# Patient Record
Sex: FEMALE | Race: WHITE | HISPANIC OR LATINO | Employment: FULL TIME | URBAN - METROPOLITAN AREA
[De-identification: names, ages, dates, MRNs, and addresses within clinical notes are randomized per-mention and may not be internally consistent; named-entity substitution may affect disease eponyms.]

---

## 2018-03-05 ENCOUNTER — NON-PROVIDER VISIT (OUTPATIENT)
Dept: URGENT CARE | Facility: CLINIC | Age: 38
End: 2018-03-05

## 2018-03-05 DIAGNOSIS — Z02.1 PRE-EMPLOYMENT DRUG SCREENING: ICD-10-CM

## 2018-03-05 LAB
AMP AMPHETAMINE: NORMAL
COC COCAINE: NORMAL
INT CON NEG: NORMAL
INT CON POS: NORMAL
MET METHAMPHETAMINES: NORMAL
OPI OPIATES: NORMAL
PCP PHENCYCLIDINE: NORMAL
POC DRUG COMMENT 753798-OCCUPATIONAL HEALTH: NEGATIVE
THC: NORMAL

## 2018-03-05 PROCEDURE — 80305 DRUG TEST PRSMV DIR OPT OBS: CPT | Performed by: PHYSICIAN ASSISTANT

## 2018-05-10 ENCOUNTER — APPOINTMENT (OUTPATIENT)
Dept: MEDICAL GROUP | Facility: MEDICAL CENTER | Age: 38
End: 2018-05-10
Payer: COMMERCIAL

## 2018-05-30 ENCOUNTER — OFFICE VISIT (OUTPATIENT)
Dept: URGENT CARE | Facility: PHYSICIAN GROUP | Age: 38
End: 2018-05-30
Payer: COMMERCIAL

## 2018-05-30 ENCOUNTER — HOSPITAL ENCOUNTER (OUTPATIENT)
Dept: RADIOLOGY | Facility: MEDICAL CENTER | Age: 38
End: 2018-05-30
Attending: PHYSICIAN ASSISTANT
Payer: COMMERCIAL

## 2018-05-30 VITALS
SYSTOLIC BLOOD PRESSURE: 142 MMHG | WEIGHT: 280 LBS | HEIGHT: 65 IN | BODY MASS INDEX: 46.65 KG/M2 | RESPIRATION RATE: 20 BRPM | DIASTOLIC BLOOD PRESSURE: 90 MMHG | HEART RATE: 92 BPM | OXYGEN SATURATION: 94 % | TEMPERATURE: 97.2 F

## 2018-05-30 DIAGNOSIS — M79.662 PAIN AND SWELLING OF LEFT LOWER LEG: ICD-10-CM

## 2018-05-30 DIAGNOSIS — S86.812A STRAIN OF CALF MUSCLE, LEFT, INITIAL ENCOUNTER: ICD-10-CM

## 2018-05-30 DIAGNOSIS — M79.89 PAIN AND SWELLING OF LEFT LOWER LEG: ICD-10-CM

## 2018-05-30 PROCEDURE — 99203 OFFICE O/P NEW LOW 30 MIN: CPT | Performed by: PHYSICIAN ASSISTANT

## 2018-05-30 PROCEDURE — 93971 EXTREMITY STUDY: CPT | Mod: LT

## 2018-05-30 ASSESSMENT — ENCOUNTER SYMPTOMS
FALLS: 0
GASTROINTESTINAL NEGATIVE: 1
LEG PAIN: 1
PALPITATIONS: 0
NEUROLOGICAL NEGATIVE: 1
RESPIRATORY NEGATIVE: 1
FEVER: 0
CHILLS: 0

## 2018-05-30 NOTE — PROGRESS NOTES
"Subjective:      Marisabel Simons is a 38 y.o. female who presents with Leg Pain (cramping sensation in left lower leg x3 days)            Leg Pain   This is a new problem. The current episode started in the past 7 days. The problem occurs constantly. The problem has been unchanged. Pertinent negatives include no change in bowel habit, chest pain, chills, fever, joint swelling, myalgias, numbness, urinary symptoms or weakness. The symptoms are aggravated by standing and walking. She has tried nothing for the symptoms. The treatment provided no relief.   Patient was at the gym 3 days ago and felt a pull of her left calf muscle. Since then she has had worsening left calf pain, swelling. Some warmth. Denies shortness of breath, chest pain or palpitations. Denies travel, nonsmoker. No history of clots.      PMH:  has no past medical history on file.  MEDS: No current outpatient prescriptions on file.  ALLERGIES: No Known Allergies  SURGHX: No past surgical history on file.  SOCHX:  reports that she has never smoked. She has never used smokeless tobacco.  FH: family history is not on file.      Review of Systems   Constitutional: Negative for chills and fever.   HENT: Negative.    Respiratory: Negative.    Cardiovascular: Positive for leg swelling (Left). Negative for chest pain and palpitations.   Gastrointestinal: Negative.  Negative for change in bowel habit.   Musculoskeletal: Negative for falls, joint pain, joint swelling and myalgias.        Left calf pain   Neurological: Negative.  Negative for weakness and numbness.       Medications, Allergies, and current problem list reviewed today in Epic     Objective:     /90   Pulse 92   Temp 36.2 °C (97.2 °F)   Resp 20   Ht 1.651 m (5' 5\")   Wt 81.6 kg (180 lb)   SpO2 94%   BMI 29.95 kg/m²      Physical Exam   Constitutional: She is oriented to person, place, and time. She appears well-developed and well-nourished. No distress.   HENT:   Head: " Normocephalic and atraumatic.   Eyes: Conjunctivae and EOM are normal.   Neck: Normal range of motion. Neck supple.   Cardiovascular: Normal rate, regular rhythm, normal heart sounds and intact distal pulses.    Pulmonary/Chest: Effort normal and breath sounds normal. No respiratory distress. She has no wheezes.   Musculoskeletal: Normal range of motion. She exhibits edema and tenderness.   Significant tenderness and swelling of the left lower extremity. Range of motion Strine joints within normal limits.   Neurological: She is alert and oriented to person, place, and time.   Skin: Skin is warm and dry. She is not diaphoretic.   Psychiatric: She has a normal mood and affect. Her behavior is normal. Judgment and thought content normal.   Nursing note and vitals reviewed.              Assessment/Plan:     1. Pain and swelling of left lower leg  US-EXTREMITY VENOUS UNILATERAL-LOWER LEFT   2. Strain of calf muscle, left, initial encounter       DVT ultrasound negative. Likely muscular strain.  Rice therapy  OTC meds and conservative measures as discussed  Return to clinic or go to ED if symptoms worsen or persist. Indications for ED discussed at length. Patient voices understanding. Follow-up with your primary care provider in 3-5 days. Red flags discussed. All side effects of medication discussed including allergic response, GI upset, tendon injury, etc.    Please note that this dictation was created using voice recognition software. I have made every reasonable attempt to correct obvious errors, but I expect that there are errors of grammar and possibly content that I did not discover before finalizing the note.

## 2018-06-06 ASSESSMENT — ENCOUNTER SYMPTOMS
WEAKNESS: 0
MYALGIAS: 0
CHANGE IN BOWEL HABIT: 0
NUMBNESS: 0
JOINT SWELLING: 0

## 2020-01-23 ENCOUNTER — HOSPITAL ENCOUNTER (EMERGENCY)
Facility: MEDICAL CENTER | Age: 40
End: 2020-01-23
Attending: EMERGENCY MEDICINE
Payer: COMMERCIAL

## 2020-01-23 ENCOUNTER — APPOINTMENT (OUTPATIENT)
Dept: RADIOLOGY | Facility: MEDICAL CENTER | Age: 40
End: 2020-01-23
Attending: EMERGENCY MEDICINE
Payer: COMMERCIAL

## 2020-01-23 ENCOUNTER — NON-PROVIDER VISIT (OUTPATIENT)
Dept: OCCUPATIONAL MEDICINE | Facility: CLINIC | Age: 40
End: 2020-01-23
Payer: COMMERCIAL

## 2020-01-23 VITALS
TEMPERATURE: 98 F | RESPIRATION RATE: 38 BRPM | DIASTOLIC BLOOD PRESSURE: 76 MMHG | SYSTOLIC BLOOD PRESSURE: 171 MMHG | BODY MASS INDEX: 46.69 KG/M2 | HEART RATE: 86 BPM | OXYGEN SATURATION: 99 % | WEIGHT: 280.2 LBS | HEIGHT: 65 IN

## 2020-01-23 DIAGNOSIS — Z02.83 ENCOUNTER FOR DRUG SCREENING: ICD-10-CM

## 2020-01-23 DIAGNOSIS — S80.01XA CONTUSION OF RIGHT KNEE, INITIAL ENCOUNTER: ICD-10-CM

## 2020-01-23 DIAGNOSIS — S62.101A CLOSED FRACTURE OF RIGHT WRIST, INITIAL ENCOUNTER: ICD-10-CM

## 2020-01-23 DIAGNOSIS — I10 HYPERTENSION, UNSPECIFIED TYPE: ICD-10-CM

## 2020-01-23 PROCEDURE — 99285 EMERGENCY DEPT VISIT HI MDM: CPT

## 2020-01-23 PROCEDURE — 82075 ASSAY OF BREATH ETHANOL: CPT | Performed by: PREVENTIVE MEDICINE

## 2020-01-23 PROCEDURE — 700111 HCHG RX REV CODE 636 W/ 250 OVERRIDE (IP): Performed by: EMERGENCY MEDICINE

## 2020-01-23 PROCEDURE — 73110 X-RAY EXAM OF WRIST: CPT | Mod: LT

## 2020-01-23 PROCEDURE — 25605 CLTX DST RDL FX/EPHYS SEP W/: CPT

## 2020-01-23 PROCEDURE — 29125 APPL SHORT ARM SPLINT STATIC: CPT

## 2020-01-23 PROCEDURE — 99026 IN-HOSPITAL ON CALL SERVICE: CPT | Performed by: PREVENTIVE MEDICINE

## 2020-01-23 PROCEDURE — 25565 CLTX RDL&ULN SHFT FX W/MNPJ: CPT

## 2020-01-23 PROCEDURE — 73100 X-RAY EXAM OF WRIST: CPT | Mod: LT

## 2020-01-23 PROCEDURE — 96375 TX/PRO/DX INJ NEW DRUG ADDON: CPT

## 2020-01-23 PROCEDURE — 96376 TX/PRO/DX INJ SAME DRUG ADON: CPT

## 2020-01-23 PROCEDURE — 302874 HCHG BANDAGE ACE 2 OR 3""

## 2020-01-23 PROCEDURE — 96365 THER/PROPH/DIAG IV INF INIT: CPT

## 2020-01-23 RX ORDER — OXYCODONE HYDROCHLORIDE AND ACETAMINOPHEN 5; 325 MG/1; MG/1
1 TABLET ORAL EVERY 4 HOURS PRN
Qty: 20 TAB | Refills: 0 | Status: SHIPPED | OUTPATIENT
Start: 2020-01-23 | End: 2020-01-26

## 2020-01-23 RX ORDER — ONDANSETRON 2 MG/ML
4 INJECTION INTRAMUSCULAR; INTRAVENOUS ONCE
Status: COMPLETED | OUTPATIENT
Start: 2020-01-23 | End: 2020-01-23

## 2020-01-23 RX ORDER — MORPHINE SULFATE 4 MG/ML
4 INJECTION, SOLUTION INTRAMUSCULAR; INTRAVENOUS ONCE
Status: COMPLETED | OUTPATIENT
Start: 2020-01-23 | End: 2020-01-23

## 2020-01-23 RX ADMIN — MORPHINE SULFATE 4 MG: 4 INJECTION INTRAVENOUS at 22:00

## 2020-01-23 RX ADMIN — MORPHINE SULFATE 4 MG: 4 INJECTION INTRAVENOUS at 20:30

## 2020-01-23 RX ADMIN — PROPOFOL 80 MG: 10 INJECTION, EMULSION INTRAVENOUS at 19:15

## 2020-01-23 RX ADMIN — MORPHINE SULFATE 4 MG: 4 INJECTION INTRAVENOUS at 18:21

## 2020-01-23 RX ADMIN — ONDANSETRON 4 MG: 2 INJECTION INTRAMUSCULAR; INTRAVENOUS at 18:21

## 2020-01-23 NOTE — LETTER
"  FORM C-4:  EMPLOYEE’S CLAIM FOR COMPENSATION/ REPORT OF INITIAL TREATMENT  EMPLOYEE’S CLAIM - PROVIDE ALL INFORMATION REQUESTED   First Name Marisabel Last Name Ronak Birthdate 1980  Sex female Claim Number   Home Employee Address [address]                                     Zip  [address] Height  1.651 m (5' 5\") Weight  (!) 127.1 kg (280 lb 3.3 oz) [facility]     Mailing Employee Address [address]               Zip  [address] Telephone  [phone] (home)  Primary Language Spoken   Insurer  [employer]   [employer] 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 (if applicable) walking down the melo    How did this injury or occupational disease occur? Be specific and answer in detail. Use additional sheet if necessary)  Walking back from a meeting, slipped, and fell against the wall   If you believe that you have an occupational disease, when did you first have knowledge of the disability and it relationship to your employment? n/a Witnesses to the Accident  n/a   Nature of Injury or Occupational Disease  Contusion Part(s) of Body Injured or Affected  Wrist (L) and Hand (L), Knee (L), N/A    I CERTIFY THAT THE ABOVE IS TRUE AND CORRECT TO THE BEST OF MY KNOWLEDGE AND THAT I HAVE PROVIDED THIS INFORMATION IN ORDER TO OBTAIN THE BENEFITS OF [place]CC[place]  I HEREBY AUTHORIZE ANY PHYSICIAN, CHIROPRACTOR, SURGEON, PRACTITIONER, OR OTHER PERSON, ANY HOSPITAL, INCLUDING [facility] OR [facility], ANY MEDICAL SERVICE ORGANIZATION, ANY INSURANCE COMPANY, OR OTHER INSTITUTION OR ORGANIZATION TO RELEASE TO EACH OTHER, ANY MEDICAL OR OTHER INFORMATION, INCLUDING BENEFITS PAID OR PAYABLE, PERTINENT TO THIS INJURY OR DISEASE, EXCEPT INFORMATION RELATIVE TO DIAGNOSIS, TREATMENT AND/OR COUNSELING FOR AIDS, PSYCHOLOGICAL CONDITIONS, ALCOHOL OR CONTROLLED SUBSTANCES, FOR WHICH I MUST GIVE SPECIFIC AUTHORIZATION.  A PHOTOSTAT OF THIS AUTHORIZATION SHALL BE AS VALID AS THE ORIGINAL.  Date January 23,2020         [facility]       Employee’s Signature   THIS REPORT MUST BE COMPLETED AND MAILED WITHIN 3 WORKING DAYS OF TREATMENT   [facility], EMERGENCY DEPT                                                                             Name of Facility [facility]   Date  1/23/2020 Diagnosis  (S62.101A) Closed fracture of right wrist, initial encounter  (S80.01XA) Contusion of right knee, initial encounter  (I10) Hypertension, unspecified type Is there evidence the injured employee was under the influence of alcohol and/or another controlled substance at the time of accident?   Hour  9:35 PM Description of Injury or Disease  Closed fracture of right wrist, initial encounter  Contusion of right knee, initial encounter  Hypertension, unspecified type No   Treatment  Patient was evaluated noted to have a comminuted distal radius and ulnar fracture.  The patient was sedated and reduced in the emergency department.  The patient was placed into a splint and given pain medications.  Have you advised the patient to remain off work five days or more?         Yes   X-Ray Findings  Positive If Yes   From Date    To Date      From information  "given by the employee, together with medical evidence, can you directly connect this injury or occupational disease as job incurred? Yes If No, is employee capable of: Full Duty  No Modified Duty  Yes   Is additional medical care by a physician indicated? Yes If Modified Duty, Specify any Limitations / Restrictions   No use of the right arm until cleared by orthopedic surgery   Do you know of any previous injury or disease contributing to this condition or occupational disease? No    Date 1/23/2020 Print Doctor’s Name César Hicks certify the employer’s copy of this form was mailed on:   Address [address] [place] [id number] [phone] INSURER’S USE ONLY   Provider’s Tax ID Number [id number] Telephone Dept: [phone]    Doctor’s Signature e-CÉSAR Ding M.D. Degree  MD      Form C-4 (rev.10/07)                                                                         BRIEF DESCRIPTION OF RIGHTS AND BENEFITS  ([place]ursuant to [place] 616[place])    Notice of Injury or Occupational Disease (Incident Report Form C-1): If an injury or occupational disease (OD) arises out of and in the course of employment, you must provide written notice to your employer as soon as practicable, but no later than 7 days after the accident or OD. Your employer shall maintain a sufficient supply of the required forms.    Claim for Compensation (Form C-4): If medical treatment is sought, the form C-4 is available at the place of initial treatment. A completed \"Claim for Compensation\" (Form C-4) must be filed within 90 days after an accident or OD. The treating physician or chiropractor must, within 3 working days after treatment, complete and mail to the employer, the employer's insurer and third-party , the Claim for Compensation.    Medical Treatment: If you require medical treatment for your on-the-job injury or OD, you may be required to select a physician or chiropractor from a list provided by your " workers’ compensation insurer, if it has contracted with an Organization for Managed Care (MCO) or Preferred Provider Organization (PPO) or providers of health care. If your employer has not entered into a contract with an MCO or PPO, you may select a physician or chiropractor from the Panel of Physicians and Chiropractors. Any medical costs related to your industrial injury or OD will be paid by your insurer.    Temporary Total Disability (TTD): If your doctor has certified that you are unable to work for a period of at least 5 consecutive days, or 5 cumulative days in a 20-day period, or places restrictions on you that your employer does not accommodate, you may be entitled to TTD compensation.    Temporary Partial Disability (TPD): If the wage you receive upon reemployment is less than the compensation for TTD to which you are entitled, the insurer may be required to pay you TPD compensation to make up the difference. TPD can only be paid for a maximum of 24 months.    Permanent Partial Disability (PPD): When your medical condition is stable and there is an indication of a PPD as a result of your injury or OD, within 30 days, your insurer must arrange for an evaluation by a rating physician or chiropractor to determine the degree of your PPD. The amount of your PPD award depends on the date of injury, the results of the PPD evaluation and your age and wage.    Permanent Total Disability (PTD): If you are medically certified by a treating physician or chiropractor as permanently and totally disabled and have been granted a PTD status by your insurer, you are entitled to receive monthly benefits not to exceed 66 2/3% of your average monthly wage. The amount of your PTD payments is subject to reduction if you previously received a PPD award.    Vocational Rehabilitation Services: You may be eligible for vocational rehabilitation services if you are unable to return to the job due to a permanent physical impairment  or permanent restrictions as a result of your injury or occupational disease.    Transportation and Per Candice Reimbursement: You may be eligible for travel expenses and per candice associated with medical treatment.    Reopening: You may be able to reopen your claim if your condition worsens after claim closure.     Appeal Process: If you disagree with a written determination issued by the insurer or the insurer does not respond to your request, you may appeal to the Department of Administration, , by following the instructions contained in your determination letter. You must appeal the determination within 70 days from the date of the determination letter at [address], [address], [place], [place] [address], or [address], [address], [place], [place] [address]. If you disagree with the  decision, you may appeal to the Department of Administration, . You must file your appeal within 30 days from the date of the  decision letter at [address], [address], [place], [place] [address], or [address], [address], [place], [place] [address]. If you disagree with a decision of an , you may file a petition for judicial review with the District Court. You must do so within 30 days of the Appeal Officer’s decision. You may be represented by an  at your own expense or you may contact the [facility] for possible representation.    [place]  for Injured Workers (NAIW): If you disagree with a  decision, you may request that NAIW represent you without charge at an  Hearing. For information regarding denial of benefits, you may contact the [facility] at: [address], [address], [place] [address], [phone], or [address], [address], [place] [address], [phone]    To File a Complaint with the Division: If you wish to file a complaint with the  of  the Division of Industrial Relations (DIR),  please contact the Workers’ Compensation Section, [address], [address], [place], [place] [address], telephone ([phone]) [phone], or [address], [address], [place], [place] [address], telephone [phone].    For assistance with Workers’ Compensation Issues: You may contact the Office of the Governor Consumer Health Assistance, [address] [place] [address], [address], [place], [place] [address], Toll Free 1-488.139.5664, Web site: http://[employer].[facility].nv.us, E-mail [email or web address].nv.  D-2 (rev. 06/18)              __________________________________________________________________                                    January 23, 2020            Employee Name / Signature                                                                                                                            Date

## 2020-01-23 NOTE — LETTER
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 (if applicable) walking down the melo    How did this injury or occupational disease occur? Be specific and answer in detail. Use additional sheet if necessary)  Walking back from a meeting, slipped, and fell against the wall   If you believe that you have an occupational disease, when did you first have knowledge of the disability and it relationship to your employment? n/a Witnesses to the Accident  n/a   Nature of Injury or Occupational Disease  Contusion Part(s) of Body Injured or Affected  Wrist (L) and Hand (L), Knee (L), N/A    I CERTIFY THAT THE ABOVE IS TRUE AND CORRECT TO THE BEST OF MY KNOWLEDGE AND THAT I HAVE PROVIDED THIS INFORMATION IN ORDER TO OBTAIN THE BENEFITS OF [place] " IN[place]IVE OR [place]).  I HEREBY AUTHORIZE ANY PHYSICIAN, CHIROPRACTOR, SURGEON, PRACTITIONER, OR OTHER PERSON, ANY HOSPITAL, INCLUDING [facility] OR [facility], ANY MEDICAL SERVICE ORGANIZATION, ANY INSURANCE COMPANY, OR OTHER INSTITUTION OR ORGANIZATION TO RELEASE TO EACH OTHER, ANY MEDICAL OR OTHER INFORMATION, INCLUDING BENEFITS PAID OR PAYABLE, PERTINENT TO THIS INJURY OR DISEASE, EXCEPT INFORMATION RELATIVE TO DIAGNOSIS, TREATMENT AND/OR COUNSELING FOR AIDS, PSYCHOLOGICAL CONDITIONS, ALCOHOL OR CONTROLLED SUBSTANCES, FOR WHICH I MUST GIVE SPECIFIC AUTHORIZATION.  A PHOTOSTAT OF THIS AUTHORIZATION SHALL BE AS VALID AS THE ORIGINAL.  Date                                      Place                                                                             Employee’s Signature   THIS REPORT MUST BE COMPLETED AND MAILED WITHIN 3 WORKING DAYS OF TREATMENT   Place [facility], EMERGENCY DEPT                                                                             Name of Facility [facility]   Date  1/23/2020 Diagnosis  No diagnosis found. Is there evidence the injured employee was under the influence of alcohol and/or another controlled substance at the time of accident?   Hour  8:42 PM Description of Injury or Disease       Treatment     Have you advised the patient to remain off work five days or more?             X-Ray Findings    If Yes   From Date    To Date      From information given by the employee, together with medical evidence, can you directly connect this injury or occupational disease as job incurred?   If No, is employee capable of: Full Duty    Modified Duty      Is additional medical care by a physician indicated?   If Modified Duty, Specify any Limitations / Restrictions       Do you know of any previous injury or disease contributing to this condition or occupational disease?      Date 1/23/2020 Print Doctor’s  "Name HicksCésar ayoub EDGARDO certify the employer’s copy of this form was mailed on:   Address [address] [address] [phone] INSURER’S USE ONLY   Provider’s Tax ID Number   Telephone Dept: [phone]    Doctor’s Signature   Degree        Form C-4 (rev.10/07)                                                                         BRIEF DESCRIPTION OF RIGHTS AND BENEFITS  ([place]ursuant to [place] 616[place])    Notice of Injury or Occupational Disease (Incident Report Form C-1): If an injury or occupational disease (OD) arises out of and in the course of employment, you must provide written notice to your employer as soon as practicable, but no later than 7 days after the accident or OD. Your employer shall maintain a sufficient supply of the required forms.    Claim for Compensation (Form C-4): If medical treatment is sought, the form C-4 is available at the place of initial treatment. A completed \"Claim for Compensation\" (Form C-4) must be filed within 90 days after an accident or OD. The treating physician or chiropractor must, within 3 working days after treatment, complete and mail to the employer, the employer's insurer and third-party , the Claim for Compensation.    Medical Treatment: If you require medical treatment for your on-the-job injury or OD, you may be required to select a physician or chiropractor from a list provided by your workers’ compensation insurer, if it has contracted with an Organization for Managed Care (MCO) or Preferred Provider Organization (PPO) or providers of health care. If your employer has not entered into a contract with an MCO or PPO, you may select a physician or chiropractor from the Panel of Physicians and Chiropractors. Any medical costs related to your industrial injury or OD will be paid by your insurer.    Temporary Total Disability (TTD): If your doctor has certified that you are unable to work for a period of at least 5 consecutive days, or 5 " cumulative days in a 20-day period, or places restrictions on you that your employer does not accommodate, you may be entitled to TTD compensation.    Temporary Partial Disability (TPD): If the wage you receive upon reemployment is less than the compensation for TTD to which you are entitled, the insurer may be required to pay you TPD compensation to make up the difference. TPD can only be paid for a maximum of 24 months.    Permanent Partial Disability (PPD): When your medical condition is stable and there is an indication of a PPD as a result of your injury or OD, within 30 days, your insurer must arrange for an evaluation by a rating physician or chiropractor to determine the degree of your PPD. The amount of your PPD award depends on the date of injury, the results of the PPD evaluation and your age and wage.    Permanent Total Disability (PTD): If you are medically certified by a treating physician or chiropractor as permanently and totally disabled and have been granted a PTD status by your insurer, you are entitled to receive monthly benefits not to exceed 66 2/3% of your average monthly wage. The amount of your PTD payments is subject to reduction if you previously received a PPD award.    Vocational Rehabilitation Services: You may be eligible for vocational rehabilitation services if you are unable to return to the job due to a permanent physical impairment or permanent restrictions as a result of your injury or occupational disease.    Transportation and Per Candice Reimbursement: You may be eligible for travel expenses and per candice associated with medical treatment.    Reopening: You may be able to reopen your claim if your condition worsens after claim closure.     Appeal Process: If you disagree with a written determination issued by the insurer or the insurer does not respond to your request, you may appeal to the Department of Administration, , by following the instructions contained in  your determination letter. You must appeal the determination within 70 days from the date of the determination letter at [address], [address], [place], [place] [address], or [address], [address], [place], [place] [address]. If you disagree with the  decision, you may appeal to the Department of Administration, . You must file your appeal within 30 days from the date of the  decision letter at [address], [address], [place], [place] [address], or [address], [address], [place], [place] [address]. If you disagree with a decision of an , you may file a petition for judicial review with the District Court. You must do so within 30 days of the Appeal Officer’s decision. You may be represented by an  at your own expense or you may contact the [facility] for possible representation.    [place]  for Injured Workers (NAIW): If you disagree with a  decision, you may request that NAIW represent you without charge at an  Hearing. For information regarding denial of benefits, you may contact the [facility] at: [address], [address], [place] [address], [phone], or [address], [place] [address], [phone]    To File a Complaint with the Division: If you wish to file a complaint with the  of the Division of Industrial Relations (DIR),  please contact the Workers’ Compensation Section, [address], [address], [place], [place] [address], telephone [phone], or [address], [address], [place], [place] [address], telephone [phone].    For assistance with Workers’ Compensation Issues: You may contact the Office of the Governor Consumer Health Assistance, [address], [address], [place], [place] [address], Toll Free 1-193.676.1269, Web site: http://govLiveLoop.[facility].nv., E-mail quan@govcha.[facility].nv.  D-2  (rev. 06/18)              __________________________________________________________________                                    _________________            Employee Name / Signature                                                                                                                            Date

## 2020-01-24 NOTE — ED PROVIDER NOTES
ED Provider Note    CHIEF COMPLAINT  Chief Complaint   Patient presents with   • T-5000 GLF     slipped and fell, left wrist deformity, R knee pain        HPI  Marisabel Simons is a 39 y.o. female who presents to the ED with complaints of left wrist pain and right knee pain.  The patient slipped at work sudden landing on her wrist and her right knee.  The patient was able to ambulate afterwards but does have a deformity to her left wrist.  Patient denies any other injuries presents for evaluation.    REVIEW OF SYSTEMS  See HPI for further details. All other systems are negative.     PAST MEDICAL HISTORY  Past Medical History:   Diagnosis Date   • DM (diabetes mellitus) ([facility])        FAMILY HISTORY  History reviewed. No pertinent family history.  Patient's family history has been discussed and is been found to be noncontributory to his present illness  SOCIAL HISTORY  Social History     Socioeconomic History   • Marital status:      Spouse name: Not on file   • Number of children: Not on file   • Years of education: Not on file   • Highest education level: Not on file   Occupational History   • Not on file   Social Needs   • Financial resource strain: Not on file   • Food insecurity:     Worry: Not on file     Inability: Not on file   • Transportation needs:     Medical: Not on file     Non-medical: Not on file   Tobacco Use   • Smoking status: Never Smoker   • Smokeless tobacco: Never Used   Substance and Sexual Activity   • Alcohol use: Not on file   • Drug use: Not on file   • Sexual activity: Not on file   Lifestyle   • Physical activity:     Days per week: Not on file     Minutes per session: Not on file   • Stress: Not on file   Relationships   • Social connections:     Talks on phone: Not on file     Gets together: Not on file     Attends [employer] service: Not on file     Active member of club or organization: Not on file     Attends meetings of clubs or organizations: Not on file     Relationship  "status: Not on file   • Intimate partner violence:     Fear of current or ex partner: Not on file     Emotionally abused: Not on file     Physically abused: Not on file     Forced sexual activity: Not on file   Other Topics Concern   • Not on file   Social History Narrative   • Not on file      Pcp Pt States None      SURGICAL HISTORY  History reviewed. No pertinent surgical history.    CURRENT MEDICATIONS  Home Medications    **Home medications have not yet been reviewed for this encounter**       No current facility-administered medications on file prior to encounter.      No current outpatient medications on file prior to encounter.         ALLERGIES  No Known Allergies    PHYSICAL EXAM  VITAL SIGNS: BP (!) 171/76   Pulse 86   Temp 36.7 °C (98 °F) (Temporal)   Resp (!) 38   Ht 1.651 m (5' 5\")   Wt (!) 127.1 kg (280 lb 3.3 oz)   LMP 12/26/2019 (Approximate)   SpO2 99%   BMI 46.63 kg/m²    Pulse Oximetry was obtained. It showed a reading of Pulse Oximetry: 98 %.  I interpreted this as non-hypoxic.     Constitutional: Well developed, Well nourished, No acute distress, Non-toxic appearance.   HENT: Normocephalic, Atraumatic, Bilateral external ears normal, bilateral tympanic membranes normal, Oropharynx moist mucous membranes, No oral exudates, Nose normal.   Eyes:  conjunctiva is normal, there are no signs of exudate.   Neck: Supple, no cervical lymphadenopathy, no meningeal signs..   Lymphatic: No lymphadenopathy noted.   Cardiovascular: Regular rate and rhythm without murmurs gallops or rubs.   Thorax & Lungs: Lungs are clear to auscultation bilaterally, there are no wheezes no rales. Chest wall is nontender.  Abdomen: Soft, nontender nondistended. Bowel sounds are present.   Skin: Warm, Dry, No erythema,   Back: No tenderness, No CVA tenderness.  Musculoskeletal: Patient has obvious deformity to the left wrist.  Distal pulses are grossly intact.  The patient is normal sensation distally.  Elbow shoulder " nontender.  Patient has an abrasion to her right knee but good range of motion no significant bony tenderness able to ambulate    Neurologic: Alert & oriented x 3, Normal motor function, Normal sensory function, No focal deficits noted.   Psychiatric: Affect normal, Judgment normal, Mood normal.         RADIOLOGY/PROCEDURES  DX-WRIST-LIMITED 2- LEFT   Final Result      Significant improvement in alignment of distal radius and ulna shaft fractures      DX-WRIST-COMPLETE 3+ LEFT   Final Result      1.  Comminuted and markedly displaced fractures of the distal radius and ulna are identified.      DX-PORTABLE FLUOROSCOPY < 1 HOUR Is the patient pregnant? Unknown    (Results Pending)       Results for orders placed or performed in visit on 03/05/18   POCT 6 Panel Urine Drug Screen   Result Value Ref Range    AMPHETAMINE      POC THC      COCAINE      OPIATES      PHENCYCLIDINE      METHAMPHETAMINES      POC Urine Drug Screen Comment NEGATIVE     Internal Control Positive Valid     Internal Control Negative Valid      Joint Reduction Procedure Note    Indication: fracture    Consent: The patient was counseled regarding the procedure, it's indications, risks, potential complications and alternatives and any questions were answered. Consent was obtained.    Procedure: The pre-reduction exam showed distal perfusion & neurologic function to be normal. The patient was placed in the supine position. Anesthesia/pain control was obtained through conscious sedation.  Then the fracture was reduced using gentle pressure and traction and under fluoroscopy was able to align it relatively well.  This was an unstable fracture would continue to come out.  I then placed a sugar tong splint in place and molded it into place so that the patient would not slip out.  Patient tolerated the procedure well.        Complications: None        Conscious Sedation Procedure Note    Indication: procedural pain management    Consent: I have discussed  with the patient and/or the patient representative the indication, alternatives, and the possible risks and/or complications of the planned procedure and the anesthesia methods. The patient and/or patient representative appear to understand and agree to proceed.    Physician Involvement: The attending physician was present and supervising this procedure.    Pre-Sedation Documentation and Exam: I have personally completed a history, physical exam & review of systems for this patient (see notes).  Vital signs have been reviewed (see flow sheet for vitals).  I have reviewed the patient's history and review of systems.  Airway Assessment: Mallampati Class III - (soft palate & base of uvula are visible)  f3  Prior History of Anesthesia Complications: none    ASA Classification: Class 1 - A normal healthy patient    Sedation/ Anesthesia Plan: intravenous sedation    Medications Used: propofol intravenously    Monitoring and Safety: The patient was placed on a cardiac monitor and vital signs, pulse oximetry and level of consciousness were continuously evaluated throughout the procedure. The patient was closely monitored until recovery from the medications was complete and the patient had returned to baseline status. Respiratory therapy was on standby at all times during the procedure.      (The following sections must be completed)  Post-Sedation Vital Signs: Vital signs were reviewed and were stable after the procedure (see flow sheet for vitals)            Intraservice Time: Greater than 10 minutes    Post-Sedation Exam: Lungs: clear to auscultation bilaterally without crackles or wheezing and Cardiovascular: regular rate and rhythm, no murmurs rubs or gallops           Complications: none    I provided both the sedation and procedure, a nurse was present at the bedside for the entire procedure.         COURSE & MEDICAL DECISION MAKING  Pertinent Labs & Imaging studies reviewed. (See chart for details)  Patient presents  emerge department for evaluation.  Clinically the patient does have a Colles' type fracture on that left wrist.  It was reduced in the above fashion.  Postreduction films are as above they are actually much improved.  I did speak with Dr. Mott recommended for the patient to follow-up with their clinic this next week for further outpatient treatment and care.  I will start the patient on Percocet for pain control recommended elevation ice.  Patient should return as needed.  Patient was consciously sedated as described above.  She is much improved post sedation examination is normal.    FINAL IMPRESSION  1. Closed fracture of right wrist, initial encounter  oxyCODONE-acetaminophen (PERCOCET) 5-325 MG Tab   2. Contusion of right knee, initial encounter     3. Hypertension, unspecified type           The patient will return for new or worsening symptoms and is stable at the time of discharge.    The patient is referred to a primary physician for blood pressure management, diabetic screening, and for all other preventative health concerns.    I reviewed prescription monitoring program for patient's narcotic use before prescribing a scheduled drug.The patient will not drink alcohol nor drive with prescribed medications      In prescribing controlled substances to this patient, I certify that I have obtained and reviewed the medical history this patient I have also made a good altaf effort to obtain applicable records from other providers who have treated the patient and records did not demonstrate any increased risk of substance abuse that would prevent me from prescribing controlled substances.     I have conducted a physical exam and documented it. I have reviewed Ms. Simons’s prescription history as maintained by the [place] Prescription Monitoring Program.     I have assessed the patient’s risk for abuse, dependency, and addiction using the validated Opioid Risk Tool available at  https://www.mdcalc.com/svhnrm-xrit-lbfj-ort-narcotic-abuse.     Given the above, I believe the benefits of controlled substance therapy outweigh the risks. The reasons for prescribing controlled substances include in my professional opinion, controlled substances are a reasonable choice for this patient. Accordingly, I have discussed the risk and benefits, treatment plan, and alternative therapies with the patient. The patient has been consented for the medication and understands the risks.        DISPOSITION:  Patient will be discharged home in stable condition.    FOLLOW UP:  Jake Mott M.D.  [address] [facility] [address]  [phone]    Schedule an appointment as soon as possible for a visit       [address] [address] [phone]          OUTPATIENT MEDICATIONS:  Discharge Medication List as of 1/23/2020 10:03 PM      START taking these medications    Details   oxyCODONE-acetaminophen (PERCOCET) 5-325 MG Tab Take 1 Tab by mouth every four hours as needed for up to 3 days., Disp-20 Tab, R-0, Print Rx Paper                 Electronically signed by: César Hicks M.D., 1/23/2020 6:04 PM

## 2020-01-24 NOTE — ED TRIAGE NOTES
"Chief Complaint   Patient presents with   • T-5000 GLF     slipped and fell, left wrist deformity, R knee pain     BP (!) 210/114   Pulse 99   Temp 36.7 °C (98 °F) (Temporal)   Resp 16   Ht 1.651 m (5' 5\")   Wt (!) 127.1 kg (280 lb 3.3 oz)   SpO2 98%   Pt informed of wait times. Educated on triage process.  Asked to return to triage RN for any new or worsening of symptoms. Thanked for patience.        "

## 2020-01-24 NOTE — RESPIRATORY CARE
Conscious Sedation Respiratory Update       O2 (LPM): 3.5 (01/23/20 1930)  O2 Daily Delivery Respiratory : Nasal Cannula (01/23/20 1930)       ETCO2 30

## 2020-01-28 LAB
BREATH ALCOHOL COMMENT: NORMAL
POC BREATHALIZER: 0 PERCENT (ref 0–0.01)

## 2020-02-03 DIAGNOSIS — Z01.812 PRE-OPERATIVE LABORATORY EXAMINATION: ICD-10-CM

## 2020-02-03 LAB
ANION GAP SERPL CALC-SCNC: 10 MMOL/L (ref 0–11.9)
BUN SERPL-MCNC: 13 MG/DL (ref 8–22)
CALCIUM SERPL-MCNC: 9.3 MG/DL (ref 8.5–10.5)
CHLORIDE SERPL-SCNC: 101 MMOL/L (ref 96–112)
CO2 SERPL-SCNC: 25 MMOL/L (ref 20–33)
CREAT SERPL-MCNC: 0.54 MG/DL (ref 0.5–1.4)
GLUCOSE SERPL-MCNC: 297 MG/DL (ref 65–99)
HCG SERPL QL: NEGATIVE
POTASSIUM SERPL-SCNC: 3.7 MMOL/L (ref 3.6–5.5)
SODIUM SERPL-SCNC: 136 MMOL/L (ref 135–145)

## 2020-02-03 PROCEDURE — 36415 COLL VENOUS BLD VENIPUNCTURE: CPT

## 2020-02-03 PROCEDURE — 80048 BASIC METABOLIC PNL TOTAL CA: CPT

## 2020-02-03 PROCEDURE — 84703 CHORIONIC GONADOTROPIN ASSAY: CPT

## 2020-02-03 RX ORDER — ALBUTEROL SULFATE 90 UG/1
2 AEROSOL, METERED RESPIRATORY (INHALATION) EVERY 6 HOURS PRN
Status: ON HOLD | COMMUNITY
End: 2020-02-04

## 2020-02-04 ENCOUNTER — HOSPITAL ENCOUNTER (OUTPATIENT)
Facility: MEDICAL CENTER | Age: 40
End: 2020-02-04
Attending: ORTHOPAEDIC SURGERY | Admitting: ORTHOPAEDIC SURGERY
Payer: COMMERCIAL

## 2020-02-04 ENCOUNTER — ANESTHESIA (OUTPATIENT)
Dept: SURGERY | Facility: MEDICAL CENTER | Age: 40
End: 2020-02-04
Payer: COMMERCIAL

## 2020-02-04 ENCOUNTER — APPOINTMENT (OUTPATIENT)
Dept: RADIOLOGY | Facility: MEDICAL CENTER | Age: 40
End: 2020-02-04
Attending: ORTHOPAEDIC SURGERY
Payer: COMMERCIAL

## 2020-02-04 ENCOUNTER — ANESTHESIA EVENT (OUTPATIENT)
Dept: SURGERY | Facility: MEDICAL CENTER | Age: 40
End: 2020-02-04
Payer: COMMERCIAL

## 2020-02-04 VITALS
HEART RATE: 74 BPM | DIASTOLIC BLOOD PRESSURE: 69 MMHG | RESPIRATION RATE: 16 BRPM | BODY MASS INDEX: 45.88 KG/M2 | TEMPERATURE: 97.9 F | OXYGEN SATURATION: 94 % | SYSTOLIC BLOOD PRESSURE: 133 MMHG | HEIGHT: 65 IN | WEIGHT: 275.35 LBS

## 2020-02-04 LAB
GLUCOSE BLD-MCNC: 282 MG/DL (ref 65–99)
GLUCOSE BLD-MCNC: 287 MG/DL (ref 65–99)

## 2020-02-04 PROCEDURE — 700111 HCHG RX REV CODE 636 W/ 250 OVERRIDE (IP)

## 2020-02-04 PROCEDURE — 64415 NJX AA&/STRD BRCH PLXS IMG: CPT | Performed by: ORTHOPAEDIC SURGERY

## 2020-02-04 PROCEDURE — 160002 HCHG RECOVERY MINUTES (STAT): Performed by: ORTHOPAEDIC SURGERY

## 2020-02-04 PROCEDURE — 700105 HCHG RX REV CODE 258: Performed by: ORTHOPAEDIC SURGERY

## 2020-02-04 PROCEDURE — 501838 HCHG SUTURE GENERAL: Performed by: ORTHOPAEDIC SURGERY

## 2020-02-04 PROCEDURE — 160025 RECOVERY II MINUTES (STATS): Performed by: ORTHOPAEDIC SURGERY

## 2020-02-04 PROCEDURE — C1713 ANCHOR/SCREW BN/BN,TIS/BN: HCPCS | Performed by: ORTHOPAEDIC SURGERY

## 2020-02-04 PROCEDURE — 160046 HCHG PACU - 1ST 60 MINS PHASE II: Performed by: ORTHOPAEDIC SURGERY

## 2020-02-04 PROCEDURE — 73100 X-RAY EXAM OF WRIST: CPT | Mod: LT

## 2020-02-04 PROCEDURE — 160039 HCHG SURGERY MINUTES - EA ADDL 1 MIN LEVEL 3: Performed by: ORTHOPAEDIC SURGERY

## 2020-02-04 PROCEDURE — 82962 GLUCOSE BLOOD TEST: CPT

## 2020-02-04 PROCEDURE — 160028 HCHG SURGERY MINUTES - 1ST 30 MINS LEVEL 3: Performed by: ORTHOPAEDIC SURGERY

## 2020-02-04 PROCEDURE — 700111 HCHG RX REV CODE 636 W/ 250 OVERRIDE (IP): Performed by: ANESTHESIOLOGY

## 2020-02-04 PROCEDURE — 160009 HCHG ANES TIME/MIN: Performed by: ORTHOPAEDIC SURGERY

## 2020-02-04 PROCEDURE — A9270 NON-COVERED ITEM OR SERVICE: HCPCS | Performed by: ANESTHESIOLOGY

## 2020-02-04 PROCEDURE — 700101 HCHG RX REV CODE 250: Performed by: ANESTHESIOLOGY

## 2020-02-04 PROCEDURE — 700102 HCHG RX REV CODE 250 W/ 637 OVERRIDE(OP): Performed by: ANESTHESIOLOGY

## 2020-02-04 PROCEDURE — 160048 HCHG OR STATISTICAL LEVEL 1-5: Performed by: ORTHOPAEDIC SURGERY

## 2020-02-04 PROCEDURE — 160035 HCHG PACU - 1ST 60 MINS PHASE I: Performed by: ORTHOPAEDIC SURGERY

## 2020-02-04 DEVICE — SCREW HEX CORTICAL 3.2 12MM (1TCONX5=5): Type: IMPLANTABLE DEVICE | Site: WRIST | Status: FUNCTIONAL

## 2020-02-04 DEVICE — IMPLANTABLE DEVICE: Type: IMPLANTABLE DEVICE | Site: WRIST | Status: FUNCTIONAL

## 2020-02-04 DEVICE — WIRE K- SMOOTH .062 - (3TX6=18): Type: IMPLANTABLE DEVICE | Site: WRIST | Status: FUNCTIONAL

## 2020-02-04 DEVICE — SCREW CORTEX SELF TAPPING T8 DRILL EVOS 2.7MM X 12MM (2TX4=8): Type: IMPLANTABLE DEVICE | Site: WRIST | Status: FUNCTIONAL

## 2020-02-04 DEVICE — PEG THREADED TRX 20MM (1TCONX5=5): Type: IMPLANTABLE DEVICE | Site: WRIST | Status: FUNCTIONAL

## 2020-02-04 DEVICE — SCREW CORTEX SELF TAPPING T8 DRIVER EVOS 2.7MM X10MM (2TX4=8): Type: IMPLANTABLE DEVICE | Site: WRIST | Status: FUNCTIONAL

## 2020-02-04 DEVICE — WIRE KIRSCHNER 1.1 100MM (1TCONX12=12): Type: IMPLANTABLE DEVICE | Site: WRIST | Status: FUNCTIONAL

## 2020-02-04 DEVICE — SCREW HEX CORTICAL 3.2 11MM (1TCONX4=4): Type: IMPLANTABLE DEVICE | Site: WRIST | Status: FUNCTIONAL

## 2020-02-04 DEVICE — SCREW CORTEX SELF TAPPING T8 DRILL EVOS 2.7MM X 10MM (2TX4=8): Type: IMPLANTABLE DEVICE | Site: WRIST | Status: FUNCTIONAL

## 2020-02-04 DEVICE — SCREW CORTEX SELF TAPPING T8 DRIVER EVOS 2.7MM X 14MM (2TX4=8): Type: IMPLANTABLE DEVICE | Site: WRIST | Status: FUNCTIONAL

## 2020-02-04 RX ORDER — MIDAZOLAM HYDROCHLORIDE 1 MG/ML
INJECTION INTRAMUSCULAR; INTRAVENOUS PRN
Status: DISCONTINUED | OUTPATIENT
Start: 2020-02-04 | End: 2020-02-04 | Stop reason: SURG

## 2020-02-04 RX ORDER — HYDROMORPHONE HYDROCHLORIDE 1 MG/ML
0.2 INJECTION, SOLUTION INTRAMUSCULAR; INTRAVENOUS; SUBCUTANEOUS
Status: DISCONTINUED | OUTPATIENT
Start: 2020-02-04 | End: 2020-02-04 | Stop reason: HOSPADM

## 2020-02-04 RX ORDER — ONDANSETRON 2 MG/ML
4 INJECTION INTRAMUSCULAR; INTRAVENOUS
Status: DISCONTINUED | OUTPATIENT
Start: 2020-02-04 | End: 2020-02-04 | Stop reason: HOSPADM

## 2020-02-04 RX ORDER — DEXAMETHASONE SODIUM PHOSPHATE 4 MG/ML
INJECTION, SOLUTION INTRA-ARTICULAR; INTRALESIONAL; INTRAMUSCULAR; INTRAVENOUS; SOFT TISSUE PRN
Status: DISCONTINUED | OUTPATIENT
Start: 2020-02-04 | End: 2020-02-04 | Stop reason: SURG

## 2020-02-04 RX ORDER — SODIUM CHLORIDE, SODIUM LACTATE, POTASSIUM CHLORIDE, CALCIUM CHLORIDE 600; 310; 30; 20 MG/100ML; MG/100ML; MG/100ML; MG/100ML
INJECTION, SOLUTION INTRAVENOUS CONTINUOUS
Status: DISCONTINUED | OUTPATIENT
Start: 2020-02-04 | End: 2020-02-04 | Stop reason: HOSPADM

## 2020-02-04 RX ORDER — ROPIVACAINE HYDROCHLORIDE 5 MG/ML
INJECTION, SOLUTION EPIDURAL; INFILTRATION; PERINEURAL
Status: COMPLETED | OUTPATIENT
Start: 2020-02-04 | End: 2020-02-04

## 2020-02-04 RX ORDER — LIDOCAINE HYDROCHLORIDE 10 MG/ML
INJECTION, SOLUTION EPIDURAL; INFILTRATION; INTRACAUDAL; PERINEURAL
Status: COMPLETED
Start: 2020-02-04 | End: 2020-02-04

## 2020-02-04 RX ORDER — CELECOXIB 200 MG/1
200 CAPSULE ORAL ONCE
Status: COMPLETED | OUTPATIENT
Start: 2020-02-04 | End: 2020-02-04

## 2020-02-04 RX ORDER — LIDOCAINE HYDROCHLORIDE 20 MG/ML
INJECTION, SOLUTION EPIDURAL; INFILTRATION; INTRACAUDAL; PERINEURAL PRN
Status: DISCONTINUED | OUTPATIENT
Start: 2020-02-04 | End: 2020-02-04 | Stop reason: SURG

## 2020-02-04 RX ORDER — HYDROMORPHONE HYDROCHLORIDE 1 MG/ML
0.4 INJECTION, SOLUTION INTRAMUSCULAR; INTRAVENOUS; SUBCUTANEOUS
Status: DISCONTINUED | OUTPATIENT
Start: 2020-02-04 | End: 2020-02-04 | Stop reason: HOSPADM

## 2020-02-04 RX ORDER — OXYCODONE HYDROCHLORIDE AND ACETAMINOPHEN 5; 325 MG/1; MG/1
1 TABLET ORAL EVERY 8 HOURS PRN
COMMUNITY
End: 2020-04-19

## 2020-02-04 RX ORDER — CEFAZOLIN SODIUM 1 G/3ML
INJECTION, POWDER, FOR SOLUTION INTRAMUSCULAR; INTRAVENOUS PRN
Status: DISCONTINUED | OUTPATIENT
Start: 2020-02-04 | End: 2020-02-04 | Stop reason: SURG

## 2020-02-04 RX ORDER — MEPERIDINE HYDROCHLORIDE 25 MG/ML
12.5 INJECTION INTRAMUSCULAR; INTRAVENOUS; SUBCUTANEOUS
Status: DISCONTINUED | OUTPATIENT
Start: 2020-02-04 | End: 2020-02-04 | Stop reason: HOSPADM

## 2020-02-04 RX ORDER — HALOPERIDOL 5 MG/ML
1 INJECTION INTRAMUSCULAR
Status: DISCONTINUED | OUTPATIENT
Start: 2020-02-04 | End: 2020-02-04 | Stop reason: HOSPADM

## 2020-02-04 RX ORDER — HYDROMORPHONE HYDROCHLORIDE 1 MG/ML
0.1 INJECTION, SOLUTION INTRAMUSCULAR; INTRAVENOUS; SUBCUTANEOUS
Status: DISCONTINUED | OUTPATIENT
Start: 2020-02-04 | End: 2020-02-04 | Stop reason: HOSPADM

## 2020-02-04 RX ORDER — OXYCODONE HCL 5 MG/5 ML
5 SOLUTION, ORAL ORAL
Status: DISCONTINUED | OUTPATIENT
Start: 2020-02-04 | End: 2020-02-04 | Stop reason: HOSPADM

## 2020-02-04 RX ORDER — ACETAMINOPHEN 500 MG
1000 TABLET ORAL ONCE
Status: COMPLETED | OUTPATIENT
Start: 2020-02-04 | End: 2020-02-04

## 2020-02-04 RX ORDER — OXYCODONE HCL 5 MG/5 ML
10 SOLUTION, ORAL ORAL
Status: DISCONTINUED | OUTPATIENT
Start: 2020-02-04 | End: 2020-02-04 | Stop reason: HOSPADM

## 2020-02-04 RX ORDER — ONDANSETRON 2 MG/ML
INJECTION INTRAMUSCULAR; INTRAVENOUS PRN
Status: DISCONTINUED | OUTPATIENT
Start: 2020-02-04 | End: 2020-02-04 | Stop reason: SURG

## 2020-02-04 RX ORDER — HYDRALAZINE HYDROCHLORIDE 20 MG/ML
5 INJECTION INTRAMUSCULAR; INTRAVENOUS
Status: DISCONTINUED | OUTPATIENT
Start: 2020-02-04 | End: 2020-02-04 | Stop reason: HOSPADM

## 2020-02-04 RX ORDER — DIPHENHYDRAMINE HYDROCHLORIDE 50 MG/ML
6.25 INJECTION INTRAMUSCULAR; INTRAVENOUS
Status: DISCONTINUED | OUTPATIENT
Start: 2020-02-04 | End: 2020-02-04 | Stop reason: HOSPADM

## 2020-02-04 RX ADMIN — CEFAZOLIN 3 G: 330 INJECTION, POWDER, FOR SOLUTION INTRAMUSCULAR; INTRAVENOUS at 12:45

## 2020-02-04 RX ADMIN — EPHEDRINE SULFATE 10 MG: 50 INJECTION, SOLUTION INTRAVENOUS at 13:27

## 2020-02-04 RX ADMIN — SODIUM CHLORIDE, POTASSIUM CHLORIDE, SODIUM LACTATE AND CALCIUM CHLORIDE: 600; 310; 30; 20 INJECTION, SOLUTION INTRAVENOUS at 09:33

## 2020-02-04 RX ADMIN — DEXAMETHASONE SODIUM PHOSPHATE 8 MG: 4 INJECTION, SOLUTION INTRA-ARTICULAR; INTRALESIONAL; INTRAMUSCULAR; INTRAVENOUS; SOFT TISSUE at 12:48

## 2020-02-04 RX ADMIN — ONDANSETRON 4 MG: 2 INJECTION INTRAMUSCULAR; INTRAVENOUS at 13:52

## 2020-02-04 RX ADMIN — SODIUM CHLORIDE, POTASSIUM CHLORIDE, SODIUM LACTATE AND CALCIUM CHLORIDE: 600; 310; 30; 20 INJECTION, SOLUTION INTRAVENOUS at 13:53

## 2020-02-04 RX ADMIN — LIDOCAINE HYDROCHLORIDE 60 MG: 20 INJECTION, SOLUTION EPIDURAL; INFILTRATION; INTRACAUDAL at 12:45

## 2020-02-04 RX ADMIN — FENTANYL CITRATE 50 MCG: 50 INJECTION, SOLUTION INTRAMUSCULAR; INTRAVENOUS at 12:45

## 2020-02-04 RX ADMIN — LIDOCAINE HYDROCHLORIDE 5 ML: 10 INJECTION, SOLUTION EPIDURAL; INFILTRATION; INTRACAUDAL at 09:33

## 2020-02-04 RX ADMIN — CELECOXIB 200 MG: 200 CAPSULE ORAL at 09:42

## 2020-02-04 RX ADMIN — ACETAMINOPHEN 1000 MG: 500 TABLET ORAL at 09:42

## 2020-02-04 RX ADMIN — MIDAZOLAM HYDROCHLORIDE 2 MG: 1 INJECTION, SOLUTION INTRAMUSCULAR; INTRAVENOUS at 12:26

## 2020-02-04 RX ADMIN — EPHEDRINE SULFATE 10 MG: 50 INJECTION, SOLUTION INTRAVENOUS at 13:51

## 2020-02-04 RX ADMIN — PROPOFOL 200 MG: 10 INJECTION, EMULSION INTRAVENOUS at 12:45

## 2020-02-04 RX ADMIN — INSULIN HUMAN 6 UNITS: 100 INJECTION, SOLUTION PARENTERAL at 09:49

## 2020-02-04 RX ADMIN — ROPIVACAINE HYDROCHLORIDE 25 ML: 5 INJECTION, SOLUTION EPIDURAL; INFILTRATION; PERINEURAL at 12:26

## 2020-02-04 NOTE — DISCHARGE INSTR - OTHER INFO
DR. LARA'S POST-OPERATIVE INSTRUCTIONS    You have just undergone a ORIF distal radius and ulna by Dr. Lara in the operating room.  It is our wish that your postoperative recovery be as quick and comfortable as possible.  Please carefully review the following items that are important in your recovery.    After any operation, a certain degree of pain is to be expected. Take Advil (ibuprofen) and Extra Strength Tylenol as first line medications for mild to moderate pain. Taking each one every 6 hours, and staggering them so that you are taking one medicine every 3 hours, is the most effective. Refer to dosing instructions on the bottle, but in general ibuprofen dose is 600-800mg and Tylenol dose is 500mg. For most small procedures, this should be enough to keep you comfortable.  You may have been given a small prescription for stronger pain medicine which will help relieve more severe pain.  Pain medicine may make you drowsy so please curtail your activities appropriately.  Do not drive while taking pain medicine.      When you go home, please keep your operated arm elevated at all times (above the level of your heart).  If you do this, your swelling will be diminished and your pain will be diminished as well. You may also place an ice pack over your dressing or splint to help with swelling and pain.    For small hand procedures such as carpal tunnel release, trigger finger release, and cyst excision, the dressing that you have on your extremity should remain on for 3-4 days. It may then be removed, you can wash the incision gently with soap and water, and keep the incision covered with a band-aid or similar clean dressing. For larger procedures or if you have a splint on, these should remain on until follow up or as specifically instructed. If you feel that your dressing is too tight during the first 3 days  after surgery, you may loosen it. It is normal to see minor staining on the hand surgery dressing after surgery. If there is significant bleeding, you are advised to call the office during regular office hours to have this checked.  Make sure that your dressing is kept dry at all times.  You can take a shower if you cover your arm with a plastic bag. If your dressing gets wet, take it off and place band-aids on the wound and wrap it with sterile dressing that you can obtain from your local drug store.    Please call our office for a follow-up appointment, [phone]. Follow up after surgery is typically 10-14 days, unless you were specifically instructed otherwise. The sutures will be removed and you may be asked to see a hand therapist to optimize your functional result. Each of the hand therapists that you will be referred to have received special training in the care of the hand and upper extremity.    If you have questions regarding your surgery postop that you feel requires attention, please call the office at [phone] during business hours, or [phone] after hours for the answering service. If you feel that you have a surgical emergency postoperatively that requires immediate attention, please call the above numbers or go to the Emergency Department and ask for the Orthopedic Surgeon on call.

## 2020-02-04 NOTE — DISCHARGE INSTRUCTIONS
ACTIVITY: Rest and take it easy for the first 24 hours.  A responsible adult is recommended to remain with you during that time.  It is normal to feel sleepy.  We encourage you to not do anything that requires balance, judgment or coordination.    MILD FLU-LIKE SYMPTOMS ARE NORMAL. YOU MAY EXPERIENCE GENERALIZED MUSCLE ACHES, THROAT IRRITATION, HEADACHE AND/OR SOME NAUSEA.    FOR 24 HOURS DO NOT:  Drive, operate machinery or run household appliances.  Drink beer or alcoholic beverages.   Make important decisions or sign legal documents.    SPECIAL INSTRUCTIONS:   See page 1 for instructions from Dr. Lara.    DIET: To avoid nausea, slowly advance diet as tolerated, avoiding spicy or greasy foods for the first day.  Add more substantial food to your diet according to your physician's instructions.  INCREASE FLUIDS AND FIBER TO AVOID CONSTIPATION.    FOLLOW-UP APPOINTMENT:  A follow-up appointment should be arranged with your doctor; call to schedule.    You should CALL YOUR PHYSICIAN if you develop:  Fever greater than 101 degrees F.  Pain not relieved by medication, or persistent nausea or vomiting.  Excessive bleeding (blood soaking through dressing) or unexpected drainage from the wound.  Extreme redness or swelling around the incision site, drainage of pus or foul smelling drainage.  Inability to urinate or empty your bladder within 8 hours.  Problems with breathing or chest pain.    You should call 911 if you develop problems with breathing or chest pain.  If you are unable to contact your doctor or surgical center, you should go to the nearest emergency room or urgent care center.  Physician's telephone #: [phone]    If any questions arise, call your doctor.  If your doctor is not available, please feel free to call the Surgical Center at [phone].  The Center is open Monday through Friday from 7AM to 7PM.  You can also call the [employer] HOTLINE open 24 hours/day, 7 days/week and speak to a nurse at  [phone], or toll free at [phone].    A registered nurse may call you a few days after your surgery to see how you are doing after your procedure.    MEDICATIONS: Resume taking daily medication.  Take prescribed pain medication with food.  If no medication is prescribed, you may take non-aspirin pain medication if needed.  PAIN MEDICATION CAN BE VERY CONSTIPATING.  Take a stool softener or laxative such as senokot, pericolace, or milk of magnesia if needed.    Prescription given to family prior to surgery.  No pain medication given.    If your physician has prescribed pain medication that includes Acetaminophen (Tylenol), do not take additional Acetaminophen (Tylenol) while taking the prescribed medication.    Depression / Suicide Risk    As you are discharged from this [facility] facility, it is important to learn how to keep safe from harming yourself.    Recognize the warning signs:  · Abrupt changes in personality, positive or negative- including increase in energy   · Giving away possessions  · Change in eating patterns- significant weight changes-  positive or negative  · Change in sleeping patterns- unable to sleep or sleeping all the time   · Unwillingness or inability to communicate  · Depression  · Unusual sadness, discouragement and loneliness  · Talk of wanting to die  · Neglect of personal appearance   · Rebelliousness- reckless behavior  · Withdrawal from people/activities they love  · Confusion- inability to concentrate     If you or a loved one observes any of these behaviors or has concerns about self-harm, here's what you can do:  · Talk about it- your feelings and reasons for harming yourself  · Remove any means that you might use to hurt yourself (examples: pills, rope, extension cords, firearm)  · Get professional help from the community (Mental Health, Substance Abuse, psychological counseling)  · Do not be alone:Call your Safe Contact- someone whom you trust who will be there  for you.  · Call your local CRISIS HOTLINE 227-2515 or 293-426-3771  · Call your local Children's Mobile Crisis Response Team [place] (391) 034-5485 or www.[employer]  · Call the toll free National Suicide Prevention Hotlines   · National Suicide Prevention Lifeline 289-476-FSBE (6668)  · National NSC Line Network 800-SUICIDE (216-4713)

## 2020-02-04 NOTE — ANESTHESIA PROCEDURE NOTES
Airway  Date/Time: 2/4/2020 12:46 PM  Performed by: Judd Michael M.D.  Authorized by: Judd Michael M.D.     Location:  OR  Urgency:  Elective  Difficult Airway: No    Indications for Airway Management:  Anesthesia  Spontaneous Ventilation: absent    Sedation Level:  Deep  Preoxygenated: Yes    Mask Difficulty Assessment:  1 - vent by mask  Final Airway Type:  Supraglottic airway  Final Supraglottic Airway:  Standard LMA  SGA Size:  4  Number of Attempts at Approach:  1

## 2020-02-04 NOTE — OR NURSING
Patient AOx4, cooperative. VSS. On room air, O2Sat 92%. Reports good pain control with received peripheral nurve block. Tolerates sips of clears, no nausea. Pt educated and encouraged to use IS while awake. Family updated.

## 2020-02-04 NOTE — ANESTHESIA POSTPROCEDURE EVALUATION
Patient: Marisabel Simons    Procedure Summary     Date:  02/04/20 Room / Location:  [facility] 12 / SURGERY [facility]    Anesthesia Start:  1243 Anesthesia Stop:  1413    Procedures:       ORIF, WRIST- DISTAL RADIUS (Left Wrist)      ORIF, FRACTURE, RADIUS AND ULNA (Wrist) Diagnosis:  (OTHER FRACTURE OF LOWER END OF LEFT ULNA, INITIAL ENCOUNTER FOR)    Surgeon:  Ambrosio Lara M.D. Responsible Provider:  Judd Michael M.D.    Anesthesia Type:  general, peripheral nerve block ASA Status:  3          Final Anesthesia Type: general, peripheral nerve block  Last vitals  BP   Blood Pressure: 135/81    Temp   36.8 °C (98.2 °F)    Pulse   Pulse: 85   Resp   14    SpO2   95 %      Anesthesia Post Evaluation    Patient location during evaluation: PACU  Patient participation: complete - patient participated  Level of consciousness: awake and alert    Airway patency: patent  Anesthetic complications: no  Cardiovascular status: hemodynamically stable  Respiratory status: acceptable  Hydration status: euvolemic    PONV: none           Nurse Pain Score: 0 (NPRS)

## 2020-02-04 NOTE — OR NURSING
"Pt arrived to floor from PACU. Pt A+Ox4, able to make needs known, PIV Patent and SL. Pain 0/10 to LUE.   CSMT's and ARAMIS's WNL, Nerve block to LUE, able to wiggle fingers, \"arm and fingers are numb\", Elevation, Ice pack applied. Family at bedside, Pt getting dressed.   1521- Dr Lara at bedside.     Pt's VSS; denies N/V; states pain is 0/10 but tolerable level. Dressing CDI to LUE, Sling in place until pt as sensation to LUE. D/c orders received. IV dc'd. Pt changed into clothing with assistance. Discharge instructions given; pt and family verbalized understanding and questions answered. Patient states ready to d/c home. Prescriptions given. Pt  Completed phase 2.  "

## 2020-02-04 NOTE — ANESTHESIA PROCEDURE NOTES
Peripheral Block  Date/Time: 2/4/2020 12:26 PM  Performed by: Judd Michael M.D.  Authorized by: Judd Michael M.D.     Patient Location:  Pre-op  Start Time:  2/4/2020 12:26 PM  End Time:  2/4/2020 12:29 PM  Reason for Block: at surgeon's request and post-op pain management    patient identified, IV checked, site marked, risks and benefits discussed, surgical consent, monitors and equipment checked, pre-op evaluation and timeout performed    Patient Position:  Supine  Prep: ChloraPrep    Monitoring:  Heart rate, continuous pulse ox and cardiac monitor  Block Region:  Upper Extremity  Upper Extremity - Block Type:  BRACHIAL PLEXUS block, axillary approach    Laterality:  Left  Procedures: ultrasound guided  Image captured, interpreted and electronically stored.  Local Infiltration:  Lidocaine  Strength:  1 %  Dose:  3 ml  Block Type:  Single-shot  Needle Localization:  Ultrasound guidance  Injection Assessment:  Negative aspiration for heme, no paresthesia on injection, incremental injection and local visualized surrounding nerve on ultrasound  Evidence of intravascular injection: No     US Guided Axillary Block   US probe placed in axilla at intersection of pec major and biceps muscles.  Axillary Artery (AA) identified with Median (superficial), Radial (deep) and Ulnar (caudad) nerves surrounding artery.  Needle inserted cephalad to probe in an in plane approach to a perivascular/perineural position.  After negative aspiration LA injected with ease and visualized surrounding the artery and nerves.  Probe moved cephalad to identify corcobrachialis muscle and Musculocutaneous Nerve (MCN) within muscle belly. Needle inserted cephalad to probe in an in plane approach to a perineural position.  After negative aspiration LA injected with ease and visualized surrounding MCN.

## 2020-02-04 NOTE — OR NURSING
Notified Dr. Michael of [facility] of 282.    Prescription given to patient's mother.

## 2020-02-04 NOTE — OR NURSING
Patient allergies and NPO status verified, home medication reconciliation completed and belongings secured. Patient verbalizes understanding of pain scale, expected course of stay and plan of care. Surgical site verified with patient. IV access established; call light within reach. No further needs at this time; hourly rounding.       Dr. Michael, anesthesiologist, notified of pt's FSBG of 287  Per Dr. Michael, patient to receive 6 units of regular human insulin, subcutaneous and re-check FSBG one hour after.

## 2020-02-04 NOTE — ANESTHESIA PREPROCEDURE EVALUATION
Past Medical History:   Diagnosis Date   • Asthma    • DM (diabetes mellitus) ([facility]) 02/03/2020    diet controlled   • Hypertension          Relevant Problems   No relevant active problems       Physical Exam    Airway   Mallampati: II  TM distance: >3 FB  Neck ROM: full       Cardiovascular - normal exam  Rhythm: regular  Rate: normal  (-) murmur     Dental - normal exam         Pulmonary - normal exam  Breath sounds clear to auscultation     Abdominal   (+) obese     Neurological - normal exam                 Anesthesia Plan    ASA 3   ASA physical status 3 criteria: diabetes - poorly controlled and morbid obesity - BMI greater than or equal to 40    Plan - general and peripheral nerve block     Peripheral nerve block will be post-op pain control  Airway plan will be LMA        Induction: intravenous    Postoperative Plan: Postoperative administration of opioids is intended.    Pertinent diagnostic labs and testing reviewed    Informed Consent:    Anesthetic plan and risks discussed with patient.

## 2020-02-04 NOTE — ANESTHESIA TIME REPORT
Anesthesia Start and Stop Event Times     Date Time Event    2/4/2020 1158 Ready for Procedure     1243 Anesthesia Start     1413 Anesthesia Stop        Responsible Staff  02/04/20    Name Role Begin End    Judd Michael M.D. Anesth 1243 1413        Preop Diagnosis (Free Text):  Pre-op Diagnosis     OTHER FRACTURE OF LOWER END OF LEFT ULNA, INITIAL ENCOUNTER FOR        Preop Diagnosis (Codes):    Post op Diagnosis  Left wrist fracture      Premium Reason  Non-Premium    Comments:

## 2020-02-04 NOTE — OP REPORT
OPERATIVE NOTE     DATE OF PROCEDURE: 2/4/2020            PRE-OP DIAGNOSIS: Comminuted and significantly displaced, intra-articular, left distal radius and ulna fractures            POST-OP DIAGNOSIS: same            PROCEDURE: Open reduction internal fixation of left intra-articular distal radius and ulna fractures            SURGEON: Ambrosio Lara M.D. - Primary            ANESTHESIA: General plus regional            ESTIMATED BLOOD LOSS: Minimal                   SPECIMENS: None            COMPLICATIONS: None            CONDITION: Stable to PACU            OPERATIVE INDICATIONS AND DESCRIPTION OF PROCEDURE: This is a pleasant 39-year-old female who presented to my office after a ground-level fall with a closed fracture of the left distal radius and ulna.  The fractures were significantly displaced, significantly comminuted, and intra-articular.  We discussed treatment options.  We discussed closed and open treatment.  We discussed open reduction internal fixation of both fractures.  We discussed risks including, but not limited to, bleeding, infection, nonunion, malunion, chronic pain, need for further surgery, hardware failure, hardware irritation, risks of anesthesia.  She elected to proceed.  I saw the patient in the preoperative holding area and marked the left wrist.  They were taken back to the operating room.  General anesthesia was induced by the anesthesia provider.  A tourniquet was placed on the arm and the upper extremity was prepped and draped in usual orthopedic fashion.  A timeout was performed.  I made a 10 cm longitudinal incision over the FCR tendon sheath.  Hemostasis was obtained with bipolar cautery.  I incised the FCR sheath and pulled the tendon in an ulnar direction.  I then incised the floor of the sheath.  I then swept the FPL muscle belly manually and visualize the pronator quadratus.  There was a large rent in the pronator quadratus at the fracture site.  I elevated the pronator  quadratus sharply off of the distal radius, taking care to stay proximal to the volar radiocarpal ligaments. I encountered the distal radius fracture.  It was extremely comminuted, and she had poor bone quality.  There was at least one intra-articular split.  I carefully released the brachioradialis from the radial styloid to remove this deforming force.  I manually reduced the fracture and used fluoroscopy to confirm the reduction.  I placed a K wire through the radial styloid and into the shaft of the radius to hold the provisional reduction. Once I was happy with the reduction, I placed a distal radius plate from the tri-med set and held it provisionally with K wires.  I checked once again the reduction as well as plate positioning.  I was happy with the plate position so I placed 3 screws in the proximal portion of the plate into the shaft of the radius.  I then began placing screws distally in the most distal row of the plate.  I checked them each under fluoroscopy to make sure that they were not penetrating into the joint and that the screw lengths were appropriate.  I checked once again the reduction and was quite happy with it.  There was good volar tilt, inclination, and radial length.  The DRUJ was well reduced.  The screw lengths were appropriate, none of them were penetrating the dorsal cortex, and a joint view confirmed that no screws were violating the articular surface.   I then turned my attention to the distal ulna.  It was better reduced now that we did fix the radius, but was still a bit displaced.  I made a 10 to 12 cm longitudinal incision over the subcutaneous border of the ulna.  I bluntly dissected through the subcutaneous tissues, making sure to protect the cutaneous branch of the ulnar nerve.  I encountered the periosteum of the ulna, and elevated minimally on both sides.  I encountered an extremely comminuted distal ulna fracture.  There was several free-floating fragments.  I did my best  to manually reduce the fracture.  I placed a K wire to provisionally hold it.  I selected a 2.7 mm compression plate and placed on the ulna.  I used fluoroscopy to confirm plate size and positioning.  I placed several screws in the proximal shaft of the ulna.  I then began placing screws distally.  I had to place several locking screws distally as the bone quality was very poor.  Overall, I was able to achieve very good reduction and good stability.  The DRUJ was well aligned.  All screws were well outside of the DRUJ.  I then thoroughly irrigated both wounds with normal saline.  I reapproximated the pronator quadratus with a 2-0 Vicryl suture.  I closed the skin with 4-0 nylon suture.  A sterile dressing and splint were applied.  The patient  awoke from anesthesia and was transferred to the PACU in stable condition.

## 2020-04-19 ENCOUNTER — HOSPITAL ENCOUNTER (OUTPATIENT)
Dept: RADIOLOGY | Facility: MEDICAL CENTER | Age: 40
End: 2020-04-19
Attending: FAMILY MEDICINE
Payer: COMMERCIAL

## 2020-04-19 ENCOUNTER — OFFICE VISIT (OUTPATIENT)
Dept: URGENT CARE | Facility: PHYSICIAN GROUP | Age: 40
End: 2020-04-19
Payer: COMMERCIAL

## 2020-04-19 VITALS
HEIGHT: 65 IN | BODY MASS INDEX: 45.82 KG/M2 | SYSTOLIC BLOOD PRESSURE: 136 MMHG | DIASTOLIC BLOOD PRESSURE: 78 MMHG | WEIGHT: 275 LBS | OXYGEN SATURATION: 95 % | HEART RATE: 106 BPM | TEMPERATURE: 99.4 F

## 2020-04-19 DIAGNOSIS — M25.531 RIGHT WRIST PAIN: ICD-10-CM

## 2020-04-19 PROCEDURE — 99214 OFFICE O/P EST MOD 30 MIN: CPT | Performed by: FAMILY MEDICINE

## 2020-04-19 PROCEDURE — 93971 EXTREMITY STUDY: CPT | Mod: RT

## 2020-04-19 PROCEDURE — 73090 X-RAY EXAM OF FOREARM: CPT | Mod: RT

## 2020-04-19 RX ORDER — TRAMADOL HYDROCHLORIDE 50 MG/1
50 TABLET ORAL EVERY 8 HOURS PRN
Qty: 20 TAB | Refills: 0 | Status: SHIPPED | OUTPATIENT
Start: 2020-04-19 | End: 2020-04-26

## 2020-04-19 RX ORDER — INDOMETHACIN 50 MG/1
50 CAPSULE ORAL 3 TIMES DAILY
Qty: 6 CAP | Refills: 0 | Status: SHIPPED | OUTPATIENT
Start: 2020-04-19 | End: 2020-04-21

## 2020-04-19 RX ORDER — CEPHALEXIN 500 MG/1
500 CAPSULE ORAL 4 TIMES DAILY
Qty: 28 CAP | Refills: 0 | Status: SHIPPED | OUTPATIENT
Start: 2020-04-19 | End: 2020-04-26

## 2020-04-19 RX ORDER — DEXAMETHASONE SODIUM PHOSPHATE 4 MG/ML
8 INJECTION, SOLUTION INTRA-ARTICULAR; INTRALESIONAL; INTRAMUSCULAR; INTRAVENOUS; SOFT TISSUE ONCE
Status: COMPLETED | OUTPATIENT
Start: 2020-04-19 | End: 2020-04-19

## 2020-04-19 RX ORDER — DEXAMETHASONE SODIUM PHOSPHATE 10 MG/ML
8 INJECTION INTRAMUSCULAR; INTRAVENOUS ONCE
Status: DISCONTINUED | OUTPATIENT
Start: 2020-04-19 | End: 2020-04-19

## 2020-04-19 RX ORDER — KETOROLAC TROMETHAMINE 30 MG/ML
30 INJECTION, SOLUTION INTRAMUSCULAR; INTRAVENOUS ONCE
Status: COMPLETED | OUTPATIENT
Start: 2020-04-19 | End: 2020-04-19

## 2020-04-19 RX ADMIN — KETOROLAC TROMETHAMINE 30 MG: 30 INJECTION, SOLUTION INTRAMUSCULAR; INTRAVENOUS at 11:37

## 2020-04-19 RX ADMIN — DEXAMETHASONE SODIUM PHOSPHATE 8 MG: 4 INJECTION, SOLUTION INTRA-ARTICULAR; INTRALESIONAL; INTRAMUSCULAR; INTRAVENOUS; SOFT TISSUE at 13:28

## 2020-04-19 ASSESSMENT — PAIN SCALES - GENERAL: PAINLEVEL: 7=MODERATE-SEVERE PAIN

## 2020-04-19 NOTE — LETTER
April 19, 2020         Patient: Marisabel Simons   YOB: 1980   Date of Visit: 4/19/2020           To Whom it May Concern:    Marisabel Simons was seen in my clinic on 4/19/2020. She may return to work in 2-3 days.    If you have any questions or concerns, please don't hesitate to call.        Sincerely,           Roberto Nye M.D.  Electronically Signed

## 2020-04-19 NOTE — PROGRESS NOTES
Subjective:      Melissa Simons is a 40 y.o. female who presents with Arm Pain (right arm pain, swelling, palm of hand painful and swollen, no known injury )      - This is a pleasant and non toxic appearing 40 y.o. female with c/o woke up w/ pain/swelling Rt forearm yesterday. Seems to localized more to wrist. No specific injury or new activity that may have triggered this. Worse moving, better rest. No NVFC            ALLERGIES:  Patient has no known allergies.     PMH:  Past Medical History:   Diagnosis Date   • Asthma    • DM (diabetes mellitus) ([facility]) 02/03/2020    diet controlled   • Hypertension         PSH:  Past Surgical History:   Procedure Laterality Date   • PB OPEN TX RADIAL & ULNAR SHAFT FX FIX RADIUS A*  2/4/2020    Procedure: ORIF, FRACTURE, RADIUS AND ULNA;  Surgeon: Ambrosio Lara M.D.;  Location: SURGERY [facility];  Service: General   • WRIST ORIF Left 2/4/2020    Procedure: ORIF, WRIST- DISTAL RADIUS;  Surgeon: Ambrosio Lara M.D.;  Location: SURGERY [facility];  Service: General   • GYN SURGERY  2007/2010    c/s x2       MEDS:    Current Outpatient Medications:   •  tramadol (ULTRAM) 50 MG Tab, Take 1 Tab by mouth every 8 hours as needed for up to 7 days., Disp: 20 Tab, Rfl: 0  •  indomethacin (INDOCIN) 50 MG Cap, Take 1 Cap by mouth 3 times a day for 2 days., Disp: 6 Cap, Rfl: 0  •  cephALEXin (KEFLEX) 500 MG Cap, Take 1 Cap by mouth 4 times a day for 7 days., Disp: 28 Cap, Rfl: 0  •  Non Formulary Request, There-Biotic 1 po daily Jaida spectrum 1 po daily Target GBX 1 po daily, Disp: , Rfl:     Current Facility-Administered Medications:   •  dexamethasone (DECADRON) injection (check route below) 8 mg, 8 mg, Oral, Once, Roberto Nye M.D.    ** I have documented what I find to be significant in regards to past medical, social, family and surgical history  in my HPI or under PMH/PSH/FH review section, otherwise it is contributory **           HPI    Review of Systems  "  Musculoskeletal: Positive for joint pain.   All other systems reviewed and are negative.         Objective:     /78   Pulse (!) 106   Temp 37.4 °C (99.4 °F) (Oral)   Ht 1.651 m (5' 5\")   Wt 124.7 kg (275 lb)   SpO2 95%   BMI 45.76 kg/m²      Physical Exam  Vitals signs and nursing note reviewed.   Constitutional:       General: She is not in acute distress.     Appearance: She is well-developed. She is not diaphoretic.   HENT:      Head: Normocephalic and atraumatic.   Eyes:      Conjunctiva/sclera: Conjunctivae normal.   Cardiovascular:      Heart sounds: Normal heart sounds. No murmur.   Pulmonary:      Effort: Pulmonary effort is normal. No respiratory distress.      Breath sounds: Normal breath sounds.   Musculoskeletal:         General: Swelling and tenderness present. No deformity or signs of injury.      Comments: Rt forearm/wrist: remarkable for mild edema of wrist w/ TTP over wrist mainly and distal forearm. No erythema/warmth or streaking. About 50% decrease ROM of wrist and fingers due to pain. NVI. Upper arm w/o edema or TTP   Skin:     General: Skin is warm and dry.   Neurological:      Mental Status: She is alert.      Motor: No abnormal muscle tone.   Psychiatric:         Judgment: Judgment normal.                 Assessment/Plan:           1. Right wrist pain  DX-FOREARM RIGHT    US-EXTREMITY VENOUS UPPER UNILAT RIGHT    ketorolac (TORADOL) injection 30 mg    tramadol (ULTRAM) 50 MG Tab    indomethacin (INDOCIN) 50 MG Cap    dexamethasone (DECADRON) injection (check route below) 8 mg    cephALEXin (KEFLEX) 500 MG Cap     * possible wrist gout. Doubt deep tissue/joint infection but will cover w/ keflex for now      - Rest/elevate  - wrist splint/sling  - 1 day recheck   - E.R. precautions discussed     Dx & d/c instructions discussed w/ patient and/or family members.     Follow up with PCP (or UC if PCP is unavailable) in 1 day to make sure improving and no further additional treatment " needed, ER if not improving or feeling/getting worse.    Any realistic and/or common medication side effects that may have been given today(i.e. Rash, GI upset/constipation, sedation, elevation of BP or blood sugars) reviewed.     Patient left in stable condition      reviewed if narcotics given

## 2020-09-01 ENCOUNTER — OFFICE VISIT (OUTPATIENT)
Dept: MEDICAL GROUP | Facility: PHYSICIAN GROUP | Age: 40
End: 2020-09-01
Payer: COMMERCIAL

## 2020-09-01 ENCOUNTER — HOSPITAL ENCOUNTER (OUTPATIENT)
Dept: LAB | Facility: MEDICAL CENTER | Age: 40
End: 2020-09-01
Attending: NURSE PRACTITIONER
Payer: COMMERCIAL

## 2020-09-01 VITALS
WEIGHT: 271 LBS | DIASTOLIC BLOOD PRESSURE: 78 MMHG | HEART RATE: 80 BPM | OXYGEN SATURATION: 96 % | RESPIRATION RATE: 16 BRPM | TEMPERATURE: 97.4 F | BODY MASS INDEX: 45.15 KG/M2 | SYSTOLIC BLOOD PRESSURE: 126 MMHG | HEIGHT: 65 IN

## 2020-09-01 DIAGNOSIS — E11.9 TYPE 2 DIABETES MELLITUS WITHOUT COMPLICATION, WITHOUT LONG-TERM CURRENT USE OF INSULIN (HCC): ICD-10-CM

## 2020-09-01 DIAGNOSIS — J45.20 MILD INTERMITTENT ASTHMA WITHOUT COMPLICATION: ICD-10-CM

## 2020-09-01 DIAGNOSIS — S62.102D CLOSED FRACTURE OF LEFT WRIST WITH ROUTINE HEALING, SUBSEQUENT ENCOUNTER: ICD-10-CM

## 2020-09-01 DIAGNOSIS — E66.01 MORBID OBESITY (HCC): ICD-10-CM

## 2020-09-01 PROBLEM — S62.102A CLOSED FRACTURE OF LEFT WRIST: Status: ACTIVE | Noted: 2020-09-01

## 2020-09-01 LAB
25(OH)D3 SERPL-MCNC: 20 NG/ML (ref 30–100)
ALBUMIN SERPL BCP-MCNC: 4 G/DL (ref 3.2–4.9)
ALBUMIN/GLOB SERPL: 1.3 G/DL
ALP SERPL-CCNC: 119 U/L (ref 30–99)
ALT SERPL-CCNC: 43 U/L (ref 2–50)
ANION GAP SERPL CALC-SCNC: 12 MMOL/L (ref 7–16)
AST SERPL-CCNC: 26 U/L (ref 12–45)
BASOPHILS # BLD AUTO: 0.6 % (ref 0–1.8)
BASOPHILS # BLD: 0.05 K/UL (ref 0–0.12)
BILIRUB SERPL-MCNC: 0.2 MG/DL (ref 0.1–1.5)
BUN SERPL-MCNC: 7 MG/DL (ref 8–22)
CALCIUM SERPL-MCNC: 8.9 MG/DL (ref 8.5–10.5)
CHLORIDE SERPL-SCNC: 101 MMOL/L (ref 96–112)
CHOLEST SERPL-MCNC: 214 MG/DL (ref 100–199)
CO2 SERPL-SCNC: 23 MMOL/L (ref 20–33)
CREAT SERPL-MCNC: 0.43 MG/DL (ref 0.5–1.4)
CREAT UR-MCNC: 117.58 MG/DL
EOSINOPHIL # BLD AUTO: 0.14 K/UL (ref 0–0.51)
EOSINOPHIL NFR BLD: 1.7 % (ref 0–6.9)
ERYTHROCYTE [DISTWIDTH] IN BLOOD BY AUTOMATED COUNT: 41.8 FL (ref 35.9–50)
FASTING STATUS PATIENT QL REPORTED: NORMAL
GLOBULIN SER CALC-MCNC: 3 G/DL (ref 1.9–3.5)
GLUCOSE SERPL-MCNC: 271 MG/DL (ref 65–99)
HCT VFR BLD AUTO: 42.6 % (ref 37–47)
HDLC SERPL-MCNC: 59 MG/DL
HGB BLD-MCNC: 13.8 G/DL (ref 12–16)
IMM GRANULOCYTES # BLD AUTO: 0.02 K/UL (ref 0–0.11)
IMM GRANULOCYTES NFR BLD AUTO: 0.2 % (ref 0–0.9)
LDLC SERPL CALC-MCNC: 130 MG/DL
LYMPHOCYTES # BLD AUTO: 1.76 K/UL (ref 1–4.8)
LYMPHOCYTES NFR BLD: 21.7 % (ref 22–41)
MCH RBC QN AUTO: 27.6 PG (ref 27–33)
MCHC RBC AUTO-ENTMCNC: 32.4 G/DL (ref 33.6–35)
MCV RBC AUTO: 85.2 FL (ref 81.4–97.8)
MICROALBUMIN UR-MCNC: 18.2 MG/DL
MICROALBUMIN/CREAT UR: 155 MG/G (ref 0–30)
MONOCYTES # BLD AUTO: 0.43 K/UL (ref 0–0.85)
MONOCYTES NFR BLD AUTO: 5.3 % (ref 0–13.4)
NEUTROPHILS # BLD AUTO: 5.71 K/UL (ref 2–7.15)
NEUTROPHILS NFR BLD: 70.5 % (ref 44–72)
NRBC # BLD AUTO: 0 K/UL
NRBC BLD-RTO: 0 /100 WBC
PLATELET # BLD AUTO: 286 K/UL (ref 164–446)
PMV BLD AUTO: 10.6 FL (ref 9–12.9)
POTASSIUM SERPL-SCNC: 4 MMOL/L (ref 3.6–5.5)
PROT SERPL-MCNC: 7 G/DL (ref 6–8.2)
RBC # BLD AUTO: 5 M/UL (ref 4.2–5.4)
SODIUM SERPL-SCNC: 136 MMOL/L (ref 135–145)
T4 FREE SERPL-MCNC: 1.2 NG/DL (ref 0.93–1.7)
TRIGL SERPL-MCNC: 126 MG/DL (ref 0–149)
TSH SERPL DL<=0.005 MIU/L-ACNC: 0.86 UIU/ML (ref 0.38–5.33)
WBC # BLD AUTO: 8.1 K/UL (ref 4.8–10.8)

## 2020-09-01 PROCEDURE — 82570 ASSAY OF URINE CREATININE: CPT

## 2020-09-01 PROCEDURE — 82043 UR ALBUMIN QUANTITATIVE: CPT

## 2020-09-01 PROCEDURE — 36415 COLL VENOUS BLD VENIPUNCTURE: CPT

## 2020-09-01 PROCEDURE — 85025 COMPLETE CBC W/AUTO DIFF WBC: CPT

## 2020-09-01 PROCEDURE — 80053 COMPREHEN METABOLIC PANEL: CPT

## 2020-09-01 PROCEDURE — 84439 ASSAY OF FREE THYROXINE: CPT

## 2020-09-01 PROCEDURE — 80061 LIPID PANEL: CPT

## 2020-09-01 PROCEDURE — 99214 OFFICE O/P EST MOD 30 MIN: CPT | Performed by: NURSE PRACTITIONER

## 2020-09-01 PROCEDURE — 83036 HEMOGLOBIN GLYCOSYLATED A1C: CPT

## 2020-09-01 PROCEDURE — 82306 VITAMIN D 25 HYDROXY: CPT

## 2020-09-01 PROCEDURE — 84443 ASSAY THYROID STIM HORMONE: CPT

## 2020-09-01 RX ORDER — METFORMIN HYDROCHLORIDE 500 MG/1
500 TABLET, EXTENDED RELEASE ORAL DAILY
Qty: 30 TAB | Refills: 1 | Status: SHIPPED | OUTPATIENT
Start: 2020-09-01 | End: 2020-09-15

## 2020-09-01 SDOH — HEALTH STABILITY: MENTAL HEALTH: HOW MANY STANDARD DRINKS CONTAINING ALCOHOL DO YOU HAVE ON A TYPICAL DAY?: 7 TO 9

## 2020-09-01 SDOH — HEALTH STABILITY: MENTAL HEALTH: HOW OFTEN DO YOU HAVE 6 OR MORE DRINKS ON ONE OCCASION?: NEVER

## 2020-09-01 SDOH — HEALTH STABILITY: MENTAL HEALTH: HOW OFTEN DO YOU HAVE A DRINK CONTAINING ALCOHOL?: 2-3 TIMES A WEEK

## 2020-09-01 ASSESSMENT — PATIENT HEALTH QUESTIONNAIRE - PHQ9: CLINICAL INTERPRETATION OF PHQ2 SCORE: 0

## 2020-09-01 NOTE — PROGRESS NOTES
Marisabel Simons is a 40 y.o. female here today to establish care and for evaluation and management of:    HPI:    Mild intermittent asthma without complication  Reports childhood asthma.  Uses albuterol rarely. No wheezing reported at this time.     Closed fracture of left wrist  This year she fell and broke wrist at work.  Had ORIF and is healing     Morbid obesity ([facility])  BMI 45.1  Not currently exercising.        Current medicines (including changes today)  Current Outpatient Medications   Medication Sig Dispense Refill   • metFORMIN ER (GLUCOPHAGE XR) 500 MG TABLET SR 24 HR Take 1 Tab by mouth every day. 30 Tab 1     No current facility-administered medications for this visit.        She  has a past medical history of Asthma and DM (diabetes mellitus) ([facility]) (2020). She also has no past medical history of Hypertension.    She  has a past surgical history that includes gyn surgery (); pr open tx radial & ulnar shaft fx fix radius a* (2020); wrist orif (Left, 2020); and tubal coagulation laparoscopic bilateral.    Social History     Tobacco Use   • Smoking status: Never Smoker   • Smokeless tobacco: Never Used   Substance Use Topics   • Alcohol use: Yes     Frequency: 2-3 times a week     Drinks per session: 7 to 9     Binge frequency: Never     Comment: 3 per week   • Drug use: Yes     Frequency: 1.0 times per week     Types: Marijuana     Comment: edible       Social History     Social History Narrative   • Not on file       Family History   Problem Relation Age of Onset   • Diabetes Mother    • Diabetes Father         renal failure   • Heart Disease Father         CHF   • Cancer Father        Family Status   Relation Name Status   • Mo  Alive   • Fa           ROS  As stated in hpi  All other systems reviewed and are negative     Objective:     /78 (BP Location: Left arm, Patient Position: Sitting)   Pulse 80   Temp 36.3 °C (97.4 °F) (Temporal)   Resp 16   Ht 1.651 m  "(5' 5\")   Wt 122.9 kg (271 lb)   SpO2 96%  Body mass index is 45.1 kg/m².  Physical Exam:    Constitutional: Alert, no distress.  Skin: Warm, dry, good turgor, no rashes in visible areas. Darkened areas with mild swelling noted bilateral lower legs  Eye: Equal, round and reactive, conjunctiva clear, lids normal.  ENMT: Lips without lesions, good dentition, oropharynx clear.  Neck: Trachea midline, no masses, no thyromegaly. No cervical or supraclavicular lymphadenopathy.  Respiratory: Unlabored respiratory effort, lungs clear to auscultation, no wheezes, no ronchi.  Cardiovascular: Normal S1, S2, no murmur, no edema.  Abdomen: Soft,morbidly obese non-tender, no masses, no hepatosplenomegaly.  Psych: Alert and oriented x3, normal affect and mood.        Assessment and Plan:   The following treatment plan was discussed    1. Type 2 diabetes mellitus without complication, without long-term current use of insulin (HCC)  New problem to me.  Chronic issue.  Untreated for several years.  Strong family history of diabetes.  Labs ordered.  Referral to dietician.  Metformin  mg daily started.  Return in 2 weeks to discuss blood glucose monitoring, kidney protection and statin.  Discussed lifestyle management including diet and exercise.  Weight loss discussed.   - CBC WITH DIFFERENTIAL; Future  - Comp Metabolic Panel; Future  - MICROALBUMIN CREAT RATIO URINE; Future  - HEMOGLOBIN A1C; Future  - Lipid Profile; Future  - TSH; Future  - FREE THYROXINE; Future  - VITAMIN D,25 HYDROXY; Future  - REFERRAL TO [facility] HEALTH IMPROVEMENT PROGRAMS (HIP) Services Requested: Registered Dietitian for Medical Nutrition Therapy; Reason for Visit: Medical Condition Requiring Nutrition Counseling    2. Closed fracture of left wrist with routine healing, subsequent encounter  New problem to me.  Healing well.  Work injury.     3. Mild intermittent asthma without complication  New problem to me.  Chronic.  Historical.  Albuterol prn, " intermittently.  No recent flare, hospitalization or need for steroids or antibiotics.     4. Morbid obesity (HCC)  New problem to me.  Chronic, ongoing. Referral to dietician,  Discussed exercise 150 min/week goal.        Records requested.  Followup: No follow-ups on file.         Educated in proper administration of medication(s) ordered today including safety, possible SE, risks, benefits, rationale and alternatives to therapy.     Please note that this dictation was created using voice recognition software. I have made every reasonable attempt to correct obvious errors, but I expect that there are errors of grammar and possibly content that I did not discover before finalizing the note.

## 2020-09-02 LAB
EST. AVERAGE GLUCOSE BLD GHB EST-MCNC: 278 MG/DL
HBA1C MFR BLD: 11.3 % (ref 0–5.6)

## 2020-09-14 ENCOUNTER — PATIENT MESSAGE (OUTPATIENT)
Dept: MEDICAL GROUP | Facility: PHYSICIAN GROUP | Age: 40
End: 2020-09-14

## 2020-09-15 ENCOUNTER — OFFICE VISIT (OUTPATIENT)
Dept: MEDICAL GROUP | Facility: PHYSICIAN GROUP | Age: 40
End: 2020-09-15
Payer: COMMERCIAL

## 2020-09-15 VITALS
WEIGHT: 271 LBS | SYSTOLIC BLOOD PRESSURE: 140 MMHG | BODY MASS INDEX: 45.15 KG/M2 | DIASTOLIC BLOOD PRESSURE: 80 MMHG | OXYGEN SATURATION: 96 % | TEMPERATURE: 97.5 F | HEIGHT: 65 IN | HEART RATE: 78 BPM | RESPIRATION RATE: 16 BRPM

## 2020-09-15 DIAGNOSIS — E78.6 ELEVATED CHOLESTEROL/HIGH DENSITY LIPOPROTEIN RATIO: ICD-10-CM

## 2020-09-15 DIAGNOSIS — E66.01 MORBID OBESITY (HCC): ICD-10-CM

## 2020-09-15 DIAGNOSIS — E11.9 TYPE 2 DIABETES MELLITUS WITHOUT COMPLICATION, WITHOUT LONG-TERM CURRENT USE OF INSULIN (HCC): ICD-10-CM

## 2020-09-15 PROCEDURE — 99214 OFFICE O/P EST MOD 30 MIN: CPT | Performed by: NURSE PRACTITIONER

## 2020-09-15 RX ORDER — METFORMIN HYDROCHLORIDE 500 MG/1
1000 TABLET, EXTENDED RELEASE ORAL DAILY
Qty: 180 TAB | Refills: 3 | Status: SHIPPED | OUTPATIENT
Start: 2020-09-15 | End: 2021-03-31 | Stop reason: SDUPTHER

## 2020-09-15 RX ORDER — ATORVASTATIN CALCIUM 20 MG/1
20 TABLET, FILM COATED ORAL DAILY
Qty: 90 TAB | Refills: 3 | Status: SHIPPED | OUTPATIENT
Start: 2020-09-15 | End: 2021-07-15 | Stop reason: SDUPTHER

## 2020-09-15 RX ORDER — ALBUTEROL SULFATE 90 UG/1
2 AEROSOL, METERED RESPIRATORY (INHALATION) EVERY 4 HOURS PRN
Qty: 1 EACH | Refills: 2 | Status: SHIPPED | OUTPATIENT
Start: 2020-09-15 | End: 2021-08-24 | Stop reason: SDUPTHER

## 2020-09-15 ASSESSMENT — FIBROSIS 4 INDEX: FIB4 SCORE: 0.58

## 2020-09-15 NOTE — NON-PROVIDER
Chief Complaint   Patient presents with   • Follow-Up   • Lab Results   • Diabetes       Subjective:   Marisabel Cummings is a 40 y.o. female here today for evaluation and management of:    Morbid obesity (HCC)  Reports walking daily around [facility]. Reduced carb and sugar intake. Awaiting to see dietician.     Type 2 diabetes mellitus without complication, without long-term current use of insulin (HCC)  Results for SRUTHI CUMMINGS (MRN [id number]) as of 9/15/2020 11:26   Ref. Range 9/1/2020 11:38   Glycohemoglobin Latest Ref Range: 0.0 - 5.6 % 11.3 (H)   Estim. Avg Glu Latest Units: mg/dL 278   Will increase metformin to 1000 mg Xr daily.  Will add trulicity.  Seeing dietician, walking daily.     Elevated cholesterol/high density lipoprotein ratio   Awaiting to see dietician, increasing exercise, decreasing carbs and sugars in diet. Reducing alcohol intake and switched from beer to no sugar seltzer beverages.   Results for SRUTHI CUMMINGS (MRN [id number]) as of 9/15/2020 13:21   Ref. Range 9/1/2020 11:38   Cholesterol,Tot Latest Ref Range: 100 - 199 mg/dL 214 (H)   Triglycerides Latest Ref Range: 0 - 149 mg/dL 126   HDL Latest Ref Range: >=40 mg/dL 59   LDL Latest Ref Range: <100 mg/dL 130 (H)       Mild intermittent asthma without complication   Reports increased needs for albuterol inhaler due to poor air quality and smoke in the air. Complains of tight chest and difficulty breathing. No wheezing. Some rhinitis and ocular pruritis.    Vitamin D deficiency  She started taking vitamin D 5,000 units daily.      Current medicines (including changes today)  Current Outpatient Medications   Medication Sig Dispense Refill   • VITAMIN D-VITAMIN K PO Take 5,000 Units by mouth.     • Probiotic Product (PROBIOTIC DAILY PO) Take  by mouth.     • Dulaglutide 0.75 MG/0.5ML Solution Pen-injector Inject 0.5 mL as instructed every 7 days. 4 Each 3   • atorvastatin (LIPITOR) 20 MG Tab Take 1 Tab by mouth every  "day. 90 Tab 3   • metFORMIN ER (GLUCOPHAGE XR) 500 MG TABLET SR 24 HR Take 2 Tabs by mouth every day. 180 Tab 3   • albuterol 108 (90 Base) MCG/ACT Aero Soln inhalation aerosol Inhale 2 Puffs by mouth every four hours as needed for Shortness of Breath. 1 Each 2     No current facility-administered medications for this visit.      She  has a past medical history of Asthma and DM (diabetes mellitus) ([facility]) (02/03/2020). She also has no past medical history of Hypertension.    ROS As stated in HPI  No chest pain, no abdominal pain. + SOB with exertion r/t poor air quality relieved with albuterol inhaler       Objective:     /80 (BP Location: Left arm, Patient Position: Sitting)   Pulse 78   Temp 36.4 °C (97.5 °F) (Temporal)   Resp 16   Ht 1.651 m (5' 5\")   Wt 122.9 kg (271 lb)   SpO2 96%  Body mass index is 45.1 kg/m².   Physical Exam:  Constitutional: Alert, no distress.  Skin: Warm, dry, good turgor,no cyanosis, no rashes in visible areas.  Eye: Equal, round and reactive, conjunctiva clear, lids normal.  Ears: No tenderness, no discharge.  External canals are without any significant edema or erythema. Gross auditory acuity is intact.  Nose: symmetrical without tenderness, no discharge.  Mouth/Throat: lips without lesion.  Oropharynx clear.   Neck: Trachea midline, no masses, no obvious thyroid enlargement.. No cervical or supraclavicular lymphadenopathy. Range of motion within normal limits.  Neuro: Cranial nerves 2-12 grossly intact.  No sensory deficit.  Respiratory: Unlabored respiratory effort, lungs clear to auscultation, no wheezes, no ronchi.  Cardiovascular: Normal S1, S2, no murmur, no edema.  Psych: Alert and oriented x3, normal affect and mood and judgement.        Assessment and Plan:   The following treatment plan was discussed    1. Elevated cholesterol/high density lipoprotein ratio  New to me, chronic. Started on atorvastatin 20mg daily. Educated on potential side effects and when to seek " emergency care. Encouraged to increase daily exercise with a goal of 150 minute/week and continue diet changes. Seeing dietician.     2. Morbid obesity (HCC)  Chronic, ongoing. Seeing dietician. Encouraged to continue diet and life style changes.    3. Type 2 diabetes mellitus without complication, without long-term current use of insulin (HCC)  Chronic, ongoing. Increased metformin XR to 500mg BID. Added trulicity weekly. Encouraged continued lifestyle and diet changes. Seeing dietician.     4. Elevated cholesterol/high density lipoprotein ratio  New to me, chronic. Start atorvastatin 20mg daily. Encouraged to continue with diet and lifestyle changes. Seeing dietician    5. Mild intermittent asthma without complication  Chronic, ongoing. Refilled albuterol inhaler. Encouraged OTC cetirizine or loratadine PRN while air quality poor. Avoid walking outside when air quality is poor.     6. Vitamin D deficiency  New to me, chronic. Taking vitamin D 5,000 units daily.     Followup: Return in about 3 months (around 12/15/2020) for Diabetes.         Educated in proper administration of medication(s) ordered today including safety, possible SE, risks, benefits, rationale and alternatives to therapy.     Please note that this dictation was created using voice recognition software. I have made every reasonable attempt to correct obvious errors, but I expect that there are errors of grammar and possibly content that I did not discover before finalizing the note.

## 2020-09-15 NOTE — ASSESSMENT & PLAN NOTE
Results for SRUTHI CUMMINGS (MRN [id number]) as of 9/15/2020 11:26   Ref. Range 9/1/2020 11:38   Glycohemoglobin Latest Ref Range: 0.0 - 5.6 % 11.3 (H)   Estim. Avg Glu Latest Units: mg/dL 278   Will increase metformin to 1000 mg Xr daily.  Will add trulicity.  Seeing dietician, walking daily.

## 2020-09-15 NOTE — PROGRESS NOTES
Chief Complaint   Patient presents with   • Follow-Up   • Lab Results   • Diabetes       Subjective:   Marisabel Cummings is a 40 y.o. female here today for evaluation and management of:    Morbid obesity (HCC)  Reports walking daily around [facility]    Type 2 diabetes mellitus without complication, without long-term current use of insulin (HCC)  Results for SRUTHI CUMMINGS (MRN [id number]) as of 9/15/2020 11:26   Ref. Range 9/1/2020 11:38   Glycohemoglobin Latest Ref Range: 0.0 - 5.6 % 11.3 (H)   Estim. Avg Glu Latest Units: mg/dL 278   Will increase metformin to 1000 mg Xr daily.  Will add trulicity.  Seeing dietician, walking daily.         Elevated cholesterol/high density lipoprotein ratio   Awaiting to see dietician, increasing exercise, decreasing carbs and sugars in diet. Reducing alcohol intake and switched from beer to no sugar seltzer beverages.   Results for SRUTHI CUMMINGS (MRN [id number]) as of 9/15/2020 13:21    Ref. Range 9/1/2020 11:38   Cholesterol,Tot Latest Ref Range: 100 - 199 mg/dL 214 (H)   Triglycerides Latest Ref Range: 0 - 149 mg/dL 126   HDL Latest Ref Range: >=40 mg/dL 59   LDL Latest Ref Range: <100 mg/dL 130 (H)     Mild intermittent asthma without complication   Reports increased needs for albuterol inhaler due to poor air quality and smoke in the air. Complains of tight chest and difficulty breathing. No wheezing. Some rhinitis and ocular pruritis.     Vitamin D deficiency   She started taking vitamin D 5,000 units daily.         Current medicines (including changes today)  Current Outpatient Medications   Medication Sig Dispense Refill   • VITAMIN D-VITAMIN K PO Take 5,000 Units by mouth.     • Probiotic Product (PROBIOTIC DAILY PO) Take  by mouth.     • Dulaglutide 0.75 MG/0.5ML Solution Pen-injector Inject 0.5 mL as instructed every 7 days. 4 Each 3   • atorvastatin (LIPITOR) 20 MG Tab Take 1 Tab by mouth every day. 90 Tab 3   • metFORMIN ER (GLUCOPHAGE XR) 500  "MG TABLET SR 24 HR Take 2 Tabs by mouth every day. 180 Tab 3   • albuterol 108 (90 Base) MCG/ACT Aero Soln inhalation aerosol Inhale 2 Puffs by mouth every four hours as needed for Shortness of Breath. 1 Each 2     No current facility-administered medications for this visit.      She  has a past medical history of Asthma and DM (diabetes mellitus) ([facility]) (02/03/2020). She also has no past medical history of Hypertension.    ROS as stated in hpi  No chest pain, no shortness of breath, no abdominal pain       Objective:     /80 (BP Location: Left arm, Patient Position: Sitting)   Pulse 78   Temp 36.4 °C (97.5 °F) (Temporal)   Resp 16   Ht 1.651 m (5' 5\")   Wt 122.9 kg (271 lb)   SpO2 96%  Body mass index is 45.1 kg/m².   Physical Exam:  Constitutional: Alert, no distress.  Skin: Warm, dry, good turgor,no cyanosis, no rashes in visible areas.  Eye: Equal, round and reactive, conjunctiva clear, lids normal.  Ears: No tenderness, no discharge.  External canals are without any significant edema or erythema.  Tympanic membranes are without any inflammation, no effusion.  Gross auditory acuity is intact.  Nose: symmetrical without tenderness, no discharge.  Mouth/Throat: lips without lesion.  Oropharynx clear.  Throat without erythema, exudates or tonsillar enlargement.  Neck: Trachea midline, no masses, no obvious thyroid enlargement.. No cervical or supraclavicular lymphadenopathy. Range of motion within normal limits.  Neuro: Cranial nerves 2-12 grossly intact.  No sensory deficit.  Respiratory: Unlabored respiratory effort, lungs clear to auscultation, no wheezes, no ronchi.  Cardiovascular: Normal S1, S2, no murmur, no edema.  Abdomen: Soft, non-tender, no masses, no guarding,  no hepatosplenomegaly.  Psych: Alert and oriented x3, normal affect and mood and judgement.        Assessment and Plan:       Assessment and Plan:   The following treatment plan was discussed   1. Elevated cholesterol/high density " lipoprotein ratio   New to me, chronic. Started on atorvastatin 20mg daily. Educated on potential side effects and when to seek emergency care. Encouraged to increase daily exercise with a goal of 150 minute/week and continue diet changes. Seeing dietician.   2. Morbid obesity (HCC)   Chronic, ongoing. Seeing dietician. Encouraged to continue diet and life style changes.   3. Type 2 diabetes mellitus without complication, without long-term current use of insulin (HCC)   Chronic, ongoing. Increased metformin XR to 500mg BID. Added trulicity weekly. Encouraged continued lifestyle and diet changes. Seeing dietician.   4. Elevated cholesterol/high density lipoprotein ratio   New to me, chronic. Start atorvastatin 20mg daily. Encouraged to continue with diet and lifestyle changes. Seeing dietician   5. Mild intermittent asthma without complication   Chronic, ongoing. Refilled albuterol inhaler. Encouraged OTC cetirizine or loratadine PRN while air quality poor. Avoid walking outside when air quality is poor.   6. Vitamin D deficiency   New to me, chronic. Taking vitamin D 5,000 units daily.         Followup: Return in about 3 months (around 12/15/2020) for Diabetes.         Educated in proper administration of medication(s) ordered today including safety, possible SE, risks, benefits, rationale and alternatives to therapy.     Please note that this dictation was created using voice recognition software. I have made every reasonable attempt to correct obvious errors, but I expect that there are errors of grammar and possibly content that I did not discover before finalizing the note.

## 2020-09-22 ENCOUNTER — NON-PROVIDER VISIT (OUTPATIENT)
Dept: HEALTH INFORMATION MANAGEMENT | Facility: MEDICAL CENTER | Age: 40
End: 2020-09-22
Payer: COMMERCIAL

## 2020-09-22 ENCOUNTER — PATIENT MESSAGE (OUTPATIENT)
Dept: MEDICAL GROUP | Facility: PHYSICIAN GROUP | Age: 40
End: 2020-09-22

## 2020-09-22 VITALS — BODY MASS INDEX: 46.7 KG/M2 | HEIGHT: 65 IN | WEIGHT: 280.3 LBS

## 2020-09-22 DIAGNOSIS — E11.9 TYPE 2 DIABETES MELLITUS WITHOUT COMPLICATION, WITHOUT LONG-TERM CURRENT USE OF INSULIN (HCC): ICD-10-CM

## 2020-09-22 PROCEDURE — 97802 MEDICAL NUTRITION INDIV IN: CPT | Performed by: DIETITIAN, REGISTERED

## 2020-09-22 ASSESSMENT — FIBROSIS 4 INDEX: FIB4 SCORE: 0.58

## 2020-09-22 NOTE — TELEPHONE ENCOUNTER
From: Marisabel Simons  To: DIPIKA Norman  Sent: 9/22/2020 11:13 AM PDT  Subject: Non-Urgent Medical Question    XIMENA I picked up the meds prescribed and started taking the Trulicity yesterday. Insurance covered most of the cost and I had a very doable co-pay. We are ok to continue with the Trulicity.    Thank you!      ----- Message -----   From:DIPIKA Norman   Sent:9/15/2020  5:13 PM PDT   To:Marisabel Simons   Subject:RE: Non-Urgent Medical Question    Melissa Pierson!  Take care  DIPIKA Norman      ----- Message -----   From:Marisabel Simons   Sent:9/15/2020 4:21 PM PDT   To:DIPIKA Norman   Subject:RE: Non-Urgent Medical Question    Thank you for the info! I have scheduled my first appointment with the Health Improvement dept. for Tue. 9/22 @3:30 pm.      ----- Message -----   From:Adria Monson Ass't   Sent:9/14/2020 4:43 PM PDT   To:Marisabel Simons   Subject:RE: Non-Urgent Medical Question    Marisabel,    It looks like the referral has been processed, please call to schedule.     [facility]  [id number] [place] [address], [address]  [place] [address]  Phone: [phone]     Thank you  Eli Nice [facility] Ass't      ----- Message -----   From:Marisabel Simons   Sent:9/14/2020 3:41 PM PDT   To:DIPIKA Norman   Subject:Non-Urgent Medical Question    Ant Siddiqi and Dr. Vieyra,    Thank you for seeing me a couple of weeks ago. During the visit Dr. Vieyra mentioned I would be refereed to the diabetic nutritionist.   I am very much looking forward to getting some support with my diet. Please let me know what I need to do go get an appointment set up with the nutritionist.    Kindest regards,

## 2020-09-22 NOTE — PROGRESS NOTES
"9/22/2020    DIPIKA Norman  40 y.o.   Time in/out: 3:33-4:36 pm     Anthropometrics/Objective  Vitals:    09/22/20 1637   Weight: (!) 127.1 kg (280 lb 4.8 oz)   Height: 1.651 m (5' 5\")       Body mass index is 46.64 kg/m².    Stated Goal Weight: 200 lb  Estimated caloric needs: 1932 kcals ([place]  Kateryna)   See comprehensive patient history form for further information     Subjective:  -Would like to improve her overall health, get her diabetes/blood sugars under better control, and lose weight  -Looking for a sustainable diet plan, has tried keto, juicing, intermittent fasting, and Atkins in the past with no long lasting success, \"falls of the wagon, and then binge eats\"   -Important for her to find an eating pattern that will also work for her family and cultural preferences, lives with 6 others (2 children of her own)   -She would like to focus on her own health first so she can set a better example for family  -Does not have control over all of the food brought into the house which is a barrier for her   -Walks occasionally, but not consistent   -Currently working from home, returning to office soon   -Not interested in counting calories   -Recently started on Trulicity and Metformin     Nutrition Diagnosis (PES Statement)  · Altered nutrition related lab values related to endocrine dysfunction as evidenced by HgbA1c of 11.3%  · Obesity related to excessive energy intake and inadequate energy energy expenditure as evidenced by BMI of 46.6    Client history:  Condition(s) associated with a diagnosis or treatment (specify) T2DM, obesity, elevated cholesterol/LDL    Biochemical data, medical test and procedures  Lab Results   Component Value Date/Time    HBA1C 11.3 (H) 09/01/2020 11:38 AM   @  Lab Results   Component Value Date/Time    POCGLUCOSE 282 (H) 02/04/2020 11:02 AM     Lab Results   Component Value Date/Time    CHOLSTRLTOT 214 (H) 09/01/2020 11:38 AM     (H) 09/01/2020 11:38 AM    HDL " 59 09/01/2020 11:38 AM    TRIGLYCERIDE 126 09/01/2020 11:38 AM   The pt does not have a glucometer and therefore is not currently checking her blood sugars. She was encouraged to request a glucometer and supplies from her PCP and begin checking.      Nutrition Intervention  Nutrition Prescription  Carb choices/grams: 30-45 per meal, 15 for snacks     Meal and Snack  Recommend a general/healthful diet, carbohydrate & calorie controlled    Comprehensive Nutrition education Instruction or training leading to in-depth nutrition related knowledge about:  Combine carb, protein and fat at each meal, Meal timing and spacing, Menu Planning, Metabolism of carb, protein, fat, Portion control, Sweets and alcohol in moderation, Heart-healthy guidelines, Label Reading.  Handouts provided on today's visit:  - Nutrition Basics  - My Plate Planner   -Carb serving List  -Food Label   -Food Journal   Monitoring & Evaluation Plan    Behavioral-Environmental:  Behavior: Start a written food journal tracking what you eat, how much you eat, and amount of carbs. Read the food label to identify amount of CHO in foods.   Exercise: Continue walking when air quality if clear     Food / Nutrient Intake:  Food intake: Follow the plate method for meal balance and portion control - fill 1/2 the plate with non-starchy vegetables, 3-4 ounces lean protein, 1-2 heart healthy fats, and <1 cup of complex carbohydrates. Choose complex carb and avoid simple sugars. Limit chocolate.   Macronutrients intake: 30-45 grams CHO per meal, 15 grams + 1 oz protein for snacks (Consistent Carb)     Physical Signs / Symptoms:  HbA1c profiles <7%  Weight loss 1-2 lbs a week to goal     Assessment Notes:  Melissa is looking for a more sustainable eating plan. I recommended a more balanced carb consistent eating pattern that is less restrictive than the fad diets she has tried previously without success. I first reviewed nutrition basics, the differences between  CHO/protein/fat and their effects on BG’s. This information was then related to the plate method to help her with meal planning and appropriate portions at meals. I showed Melissa how to use the food label to identify how much carbohydrate are in the foods she is eating and suggested she start with no more than 30-45 grams of CHO per meal and up to 15 grams of CHO at snacks, if needed. She is not interested in counting calories, but would like to start with a food journal tracking her specifically her portions and carbs to help increase her awareness about her current eating habits. I discouraged intermittent fasting as she agrees it often leads her to overeating at the next meal which is not optimal for blood glucose control or weight loss. Next visit we will discuss snacks in further detail, review her journal, review cook book recommendations, and reassess her readiness to change.     Follow up: 2 weeks

## 2020-09-23 DIAGNOSIS — E11.9 TYPE 2 DIABETES MELLITUS WITHOUT COMPLICATION, WITHOUT LONG-TERM CURRENT USE OF INSULIN (HCC): ICD-10-CM

## 2020-09-23 RX ORDER — LANCETS 30 GAUGE
EACH MISCELLANEOUS
Qty: 200 EACH | Refills: 0 | Status: SHIPPED | OUTPATIENT
Start: 2020-09-23

## 2020-09-23 NOTE — TELEPHONE ENCOUNTER
2. What supplies does the patient need? Glucometer, Lancets and Test strips    3. What brand of meter does the patient use? f Insurance Preference.    4. How many times a day is the patient testing? BID    Dx: E11.65, is not using insulin. Last A1c =   Lab Results   Component Value Date/Time    HBA1C 11.3 (H) 09/01/2020 11:38 AM   .

## 2020-10-14 ENCOUNTER — APPOINTMENT (OUTPATIENT)
Dept: HEALTH INFORMATION MANAGEMENT | Facility: MEDICAL CENTER | Age: 40
End: 2020-10-14
Payer: COMMERCIAL

## 2021-01-07 ENCOUNTER — OFFICE VISIT (OUTPATIENT)
Dept: MEDICAL GROUP | Facility: PHYSICIAN GROUP | Age: 41
End: 2021-01-07
Payer: COMMERCIAL

## 2021-01-07 VITALS
HEIGHT: 65 IN | SYSTOLIC BLOOD PRESSURE: 136 MMHG | HEART RATE: 82 BPM | TEMPERATURE: 97.2 F | BODY MASS INDEX: 46.32 KG/M2 | DIASTOLIC BLOOD PRESSURE: 84 MMHG | OXYGEN SATURATION: 95 % | RESPIRATION RATE: 18 BRPM | WEIGHT: 278 LBS

## 2021-01-07 DIAGNOSIS — J45.20 MILD INTERMITTENT ASTHMA WITHOUT COMPLICATION: ICD-10-CM

## 2021-01-07 DIAGNOSIS — E11.9 TYPE 2 DIABETES MELLITUS WITHOUT COMPLICATION, WITHOUT LONG-TERM CURRENT USE OF INSULIN (HCC): ICD-10-CM

## 2021-01-07 DIAGNOSIS — Z12.31 ENCOUNTER FOR SCREENING MAMMOGRAM FOR MALIGNANT NEOPLASM OF BREAST: ICD-10-CM

## 2021-01-07 DIAGNOSIS — E66.01 MORBID OBESITY (HCC): ICD-10-CM

## 2021-01-07 LAB
HBA1C MFR BLD: 11.1 % (ref 0–5.6)
INT CON NEG: NEGATIVE
INT CON POS: POSITIVE

## 2021-01-07 PROCEDURE — 83036 HEMOGLOBIN GLYCOSYLATED A1C: CPT | Performed by: NURSE PRACTITIONER

## 2021-01-07 PROCEDURE — 99214 OFFICE O/P EST MOD 30 MIN: CPT | Performed by: NURSE PRACTITIONER

## 2021-01-07 RX ORDER — PHENTERMINE HYDROCHLORIDE 37.5 MG/1
37.5 TABLET ORAL
Qty: 30 TAB | Refills: 0 | Status: SHIPPED | OUTPATIENT
Start: 2021-01-07 | End: 2021-02-04 | Stop reason: SDUPTHER

## 2021-01-07 ASSESSMENT — PATIENT HEALTH QUESTIONNAIRE - PHQ9: CLINICAL INTERPRETATION OF PHQ2 SCORE: 0

## 2021-01-07 ASSESSMENT — FIBROSIS 4 INDEX: FIB4 SCORE: 0.58

## 2021-01-07 NOTE — ASSESSMENT & PLAN NOTE
BMI 46.98  Would like to try some phentermine.  Working on keto, intermittent fasting and working out daily.  No issues on PDMP discussed risks and benefits.  Return in one month.

## 2021-01-07 NOTE — PROGRESS NOTES
Chief Complaint   Patient presents with   • Follow-Up   • Diabetes       Subjective:   Marisabel Simons is a 40 y.o. female here today for evaluation and management of:    Type 2 diabetes mellitus without complication, without long-term current use of insulin (HCC)  A1c 11.1 today down from 11.3.  Has started keto and intermittent fasting.  Taking metformin Xr 1000 mg daily and trulicity weekly.  Will refer to pharmacy to help consult and help with management.     Morbid obesity (HCC)  BMI 46.98  Would like to try some phentermine.  Working on keto, intermittent fasting and working out daily.  No issues on PDMP discussed risks and benefits.  Return in one month.     Mild intermittent asthma without complication  Using albuterol occasionally.            Current medicines (including changes today)  Current Outpatient Medications   Medication Sig Dispense Refill   • Dulaglutide 0.75 MG/0.5ML Solution Pen-injector Inject 0.5 mL under the skin every 7 days. 12 Each 3   • phentermine (ADIPEX-P) 37.5 MG tablet Take 1 Tab by mouth every morning before breakfast for 30 days. 30 Tab 0   • VITAMIN D-VITAMIN K PO Take 5,000 Units by mouth.     • Probiotic Product (PROBIOTIC DAILY PO) Take  by mouth.     • atorvastatin (LIPITOR) 20 MG Tab Take 1 Tab by mouth every day. 90 Tab 3   • metFORMIN ER (GLUCOPHAGE XR) 500 MG TABLET SR 24 HR Take 2 Tabs by mouth every day. 180 Tab 3   • albuterol 108 (90 Base) MCG/ACT Aero Soln inhalation aerosol Inhale 2 Puffs by mouth every four hours as needed for Shortness of Breath. 1 Each 2   • Blood Glucose Test Strips Test strips order: Test strips for  Insurance Preference. meter. Sig: use BID and prn ssx high or low sugar 200 Each 0   • Lancets Lancets order: Lancets for f Insurance Preference. meter. Sig: use BID and prn ssx high or low sugar. #100 RF x 3 200 Each 0   • Blood Glucose Monitoring Suppl Device Meter: Dispense Device of Insurance Preference. Sig. Use as directed for blood  "sugar monitoring. #1. NR. 1 Each 0     No current facility-administered medications for this visit.      She  has a past medical history of Asthma and DM (diabetes mellitus) ([facility]) (02/03/2020). She also has no past medical history of Hypertension.    ROS as stated in hpi  No chest pain, no shortness of breath, no abdominal pain       Objective:     /84 (BP Location: Left arm, Patient Position: Sitting)   Pulse 82   Temp 36.2 °C (97.2 °F) (Temporal)   Resp 18   Ht 1.638 m (5' 4.5\")   Wt (!) 126.1 kg (278 lb)   SpO2 95%  Body mass index is 46.98 kg/m².   Physical Exam:  Constitutional: Alert, no distress.  Skin: Warm, dry, good turgor,no cyanosis, no rashes in visible areas.  Eye: Equal, round and reactive, conjunctiva clear, lids normal.  Ears: No tenderness, no discharge.  External canals are without any significant edema or erythema.  Tympanic membranes are without any inflammation, no effusion.  Gross auditory acuity is intact.  Nose: symmetrical without tenderness, no discharge.  Mouth/Throat: lips without lesion.  Oropharynx clear.  Throat without erythema, exudates or tonsillar enlargement.  Neck: Trachea midline, no masses, no obvious thyroid enlargement.. No cervical or supraclavicular lymphadenopathy. Range of motion within normal limits.  Neuro: Cranial nerves 2-12 grossly intact.  No sensory deficit.  Respiratory: Unlabored respiratory effort, lungs clear to auscultation, no wheezes, no ronchi.  Cardiovascular: Normal S1, S2, no murmur, no edema.  Abdomen: Soft, non-tender, no masses, no guarding,  no hepatosplenomegaly.  Psych: Alert and oriented x3, normal affect and mood and judgement.        Assessment and Plan:   The following treatment plan was discussed    1. Type 2 diabetes mellitus without complication, without long-term current use of insulin (HCC)  Chronic, ongoing. Minimal improvement.  Will refer to pharmacy for support and consultation.  Encouraged weight loss, exercise.  Dietary " controls.  Monitor.   - POCT Hemoglobin A1C  - REFERRAL TO PHARMACOTHERAPY SERVICE    2. Morbid obesity (HCC)  Chronic, ongoing. Minimal improvement.  Will try some phentermine.  Encouraged her dietary changes and exercise.  Return in one month.  Monitor and follow.   - phentermine (ADIPEX-P) 37.5 MG tablet; Take 1 Tab by mouth every morning before breakfast for 30 days.  Dispense: 30 Tab; Refill: 0    3. Encounter for screening mammogram for malignant neoplasm of breast  Due for mammogram.  Order provided.  Monitor.   - MA-SCREENING MAMMO BILAT W/CAD; Future    4. Mild intermittent asthma without complication  Chronic, ongoing. Stable.  Rare albuterol use.        Followup: Return in about 4 weeks (around 2/4/2021) for well women, weight management.         Educated in proper administration of medication(s) ordered today including safety, possible SE, risks, benefits, rationale and alternatives to therapy.     Please note that this dictation was created using voice recognition software. I have made every reasonable attempt to correct obvious errors, but I expect that there are errors of grammar and possibly content that I did not discover before finalizing the note.

## 2021-01-07 NOTE — ASSESSMENT & PLAN NOTE
A1c 11.1 today down from 11.3.  Has started keto and intermittent fasting.  Taking metformin Xr 1000 mg daily and trulicity weekly.  Will refer to pharmacy to help consult and help with management.

## 2021-01-26 ENCOUNTER — NON-PROVIDER VISIT (OUTPATIENT)
Dept: MEDICAL GROUP | Facility: PHYSICIAN GROUP | Age: 41
End: 2021-01-26
Payer: COMMERCIAL

## 2021-01-26 DIAGNOSIS — E11.9 TYPE 2 DIABETES MELLITUS WITHOUT COMPLICATION, WITHOUT LONG-TERM CURRENT USE OF INSULIN (HCC): ICD-10-CM

## 2021-01-26 PROCEDURE — 99402 PREV MED CNSL INDIV APPRX 30: CPT | Performed by: FAMILY MEDICINE

## 2021-01-26 RX ORDER — DAPAGLIFLOZIN 5 MG/1
1 TABLET, FILM COATED ORAL DAILY
Qty: 30 TAB | Refills: 6 | Status: SHIPPED | OUTPATIENT
Start: 2021-01-26 | End: 2021-05-25

## 2021-01-26 NOTE — PROGRESS NOTES
Patient Consult Note - Initial Visit    TIME IN: 8:57am  TIME OUT: 9:31am    Primary care physician: DIPIKA Norman    Reason for consult: Management of Uncontrolled Type 2 Diabetes    HPI:  Marisabel Simons is a 40 y.o. old patient who comes in today for evaluation of above stated problem.    Most Recent HbA1c:   Lab Results   Component Value Date/Time    HBA1C 11.1 (A) 01/07/2021 12:35 PM      Lab Results   Component Value Date/Time    CREATININE 0.43 (L) 09/01/2020 11:38 AM              Diabetes Medication History and Current Regimen  Metformin ER 1000mg BID  GLP-1 Agent: Dulaglutide 0.75 mg once weekly       Pt has home glucometer and proper testing technique - Yes    Pt reports blood sugars: None to report, cost of strips too high, will contact pharmacy to determine why copay is so high.  Before Breakfast:   Before Lunch:   Before Dinner:   Before Bedtime:   Other times:     Hypoglycemia awareness - Yes  Nocturnal hypoglycemia- None  Hypoglycemia:  None      Current Exercise - Still developing exercise routine.  Exercise Goal - Stressed importance of getting back to regular exercise routine to achieve 150 or more minutes of moderate intensity exercise per week.    Dietary - Has been poor in the past, but significant changes since finding out latest A1c.  She is focusing on low CHO/high protein diet with short periods of intermittent fasting.  States that she used personal training/dietician in the past and has begun to utilize their tips to build current meal plan.    Pt reports eating:  Breakfast - no breakfast, currently intermittent fasting.  Snack - none  Lunch - cheese, low carb yogurt, tomatoes, chicken  Snack - handful of nuts, bell pepper, string cheese  Dinner - lean meats, salads, veggies  Snack - none    Foot Exam:  Monofilament exam - due at next appt  Monofilament testing with a 10 gram force: sensation intact: decreased bilaterally.    Visual Inspection: Feet without maceration,  ulcers, fissures.  Feet dry.  Pedal pulses: intact bilaterally    Preventative Management  BP regimen (ACE/ARB) - none  ASA - none  Statin - Atorvastatin  Last Retinal Scan - 6/2020 - has f/u with optometrist later this spring  Last Foot Exam - unknown, will conduct at next visit  Last A1c -   Lab Results   Component Value Date/Time    HBA1C 11.1 (A) 01/07/2021 12:35 PM      Last Microalbuminuria - 9/2020    updated caregaps    Past Medical History:  Patient Active Problem List    Diagnosis Date Noted   • Elevated cholesterol/high density lipoprotein ratio 09/15/2020   • Closed fracture of left wrist 09/01/2020   • Type 2 diabetes mellitus without complication, without long-term current use of insulin ([facility]) 09/01/2020   • Mild intermittent asthma without complication 09/01/2020   • Morbid obesity ([facility]) 09/01/2020       Past Surgical History:  Past Surgical History:   Procedure Laterality Date   • PB OPEN TX RADIAL & ULNAR SHAFT FX FIX RADIUS A*  2/4/2020    Procedure: ORIF, FRACTURE, RADIUS AND ULNA;  Surgeon: Ambrosio Lara M.D.;  Location: SURGERY [facility];  Service: General   • WRIST ORIF Left 2/4/2020    Procedure: ORIF, WRIST- DISTAL RADIUS;  Surgeon: Ambrosio Lara M.D.;  Location: SURGERY [facility];  Service: General   • GYN SURGERY  2007/2010    c/s x2   • TUBAL COAGULATION LAPAROSCOPIC BILATERAL         Allergies:  Patient has no known allergies.    Social History:  Social History     Socioeconomic History   • Marital status:      Spouse name: Not on file   • Number of children: Not on file   • Years of education: Not on file   • Highest education level: Not on file   Occupational History   • Not on file   Social Needs   • Financial resource strain: Not on file   • Food insecurity     Worry: Not on file     Inability: Not on file   • Transportation needs     Medical: Not on file     Non-medical: Not on file   Tobacco Use   • Smoking status: Never Smoker   • Smokeless tobacco: Never  Used   Substance and Sexual Activity   • Alcohol use: Yes     Frequency: 2-3 times a week     Drinks per session: 7 to 9     Binge frequency: Never     Comment: 3 per week   • Drug use: Yes     Frequency: 1.0 times per week     Types: Marijuana     Comment: edible   • Sexual activity: Yes     Partners: Male     Birth control/protection: Female Sterilization   Lifestyle   • Physical activity     Days per week: Not on file     Minutes per session: Not on file   • Stress: Not on file   Relationships   • Social connections     Talks on phone: Not on file     Gets together: Not on file     Attends Buddhist service: Not on file     Active member of club or organization: Not on file     Attends meetings of clubs or organizations: Not on file     Relationship status: Not on file   • Intimate partner violence     Fear of current or ex partner: Not on file     Emotionally abused: Not on file     Physically abused: Not on file     Forced sexual activity: Not on file   Other Topics Concern   • Not on file   Social History Narrative   • Not on file       Family History:  Family History   Problem Relation Age of Onset   • Diabetes Mother    • Diabetes Father         renal failure   • Heart Disease Father         CHF   • Cancer Father        Medications:    Current Outpatient Medications:   •  Dulaglutide 0.75 MG/0.5ML Solution Pen-injector, Inject 0.5 mL under the skin every 7 days., Disp: 12 Each, Rfl: 3  •  phentermine (ADIPEX-P) 37.5 MG tablet, Take 1 Tab by mouth every morning before breakfast for 30 days., Disp: 30 Tab, Rfl: 0  •  Blood Glucose Test Strips, Test strips order: Test strips for  Insurance Preference. meter. Sig: use BID and prn ssx high or low sugar, Disp: 200 Each, Rfl: 0  •  Lancets, Lancets order: Lancets for f Insurance Preference. meter. Sig: use BID and prn ssx high or low sugar. #100 RF x 3, Disp: 200 Each, Rfl: 0  •  Blood Glucose Monitoring Suppl Device, Meter: Dispense Device of Insurance Preference.  "Sig. Use as directed for blood sugar monitoring. #1. NR., Disp: 1 Each, Rfl: 0  •  VITAMIN D-VITAMIN K PO, Take 5,000 Units by mouth., Disp: , Rfl:   •  Probiotic Product (PROBIOTIC DAILY PO), Take  by mouth., Disp: , Rfl:   •  atorvastatin (LIPITOR) 20 MG Tab, Take 1 Tab by mouth every day., Disp: 90 Tab, Rfl: 3  •  metFORMIN ER (GLUCOPHAGE XR) 500 MG TABLET SR 24 HR, Take 2 Tabs by mouth every day., Disp: 180 Tab, Rfl: 3  •  albuterol 108 (90 Base) MCG/ACT Aero Soln inhalation aerosol, Inhale 2 Puffs by mouth every four hours as needed for Shortness of Breath., Disp: 1 Each, Rfl: 2    Labs: Reviewed    Physical Examination:  Vital signs: LMP  (LMP Unknown)  There is no height or weight on file to calculate BMI.    Assessment and Plan:    1. DM2  Patient seen today for initial DM visit.  She has long standing hx of DM (>20 yrs).  Strong paternal and maternal family hx of DM and premature death attributed to DM.  Patient states she was without routine medical care for ~five years until recently establishing with PCP.  She did not have a diabetic medication regimen at that time.  She is well versed in dietary and exercise influences on diabetic state.  During her time away from health care, she managed DM through a consistent diet and exercise program that kept her A1c \"in the 6's to 7's\".  Most recent A1c markedly elevated at 11.1.  She was started on metformin and Trulicity by PCP and referred to our clinic.    She is prepared to begin making lifestyle changes to assist with lowering A1c, including starting a low CHO, high meal plan and joining gym recently.    She has history of insulin use during both of her full term pregnancies.   Denies use of any diabetic medications other than metformin and insulin.  Tolerating Trulicity at this point.      - Medication changes -   Start Farxiga 5mg daily.  Continue all other medications for now.  At next visit, will increase Trulicity to 1.5mg per week, patient would like " "to work through her current supply of 0.75mg until then.  Discussed possibility of insulin therapy should FBG and A1c continue to be elevated.     - Lifestyle changes -   Diet - Continue to focus on low CHO, high protein diet.  She admits to some \"cheat meals\" on the weekends, encouraged her to minimize these indulgences as much as possible.  Exercise - Discussed importance of regular exercise routine.  She has joined a gym, but has not yet fully committed to routine.  Stressed the importance of 150 minutes of moderate intensity exercise per week.  She feels that this is more than attainable and will begin to refocus this week.    Follow up:  Three weeks    Jake Hazel, PharmD, BCACP  01/26/21    CC:   DIPIKA Norman M.D.    "

## 2021-02-01 ENCOUNTER — PATIENT MESSAGE (OUTPATIENT)
Dept: MEDICAL GROUP | Facility: PHYSICIAN GROUP | Age: 41
End: 2021-02-01

## 2021-02-04 ENCOUNTER — HOSPITAL ENCOUNTER (OUTPATIENT)
Facility: MEDICAL CENTER | Age: 41
End: 2021-02-04
Attending: NURSE PRACTITIONER
Payer: COMMERCIAL

## 2021-02-04 ENCOUNTER — OFFICE VISIT (OUTPATIENT)
Dept: MEDICAL GROUP | Facility: PHYSICIAN GROUP | Age: 41
End: 2021-02-04
Payer: COMMERCIAL

## 2021-02-04 VITALS
OXYGEN SATURATION: 97 % | TEMPERATURE: 96.1 F | BODY MASS INDEX: 44.98 KG/M2 | WEIGHT: 270 LBS | SYSTOLIC BLOOD PRESSURE: 156 MMHG | RESPIRATION RATE: 16 BRPM | HEART RATE: 114 BPM | HEIGHT: 65 IN | DIASTOLIC BLOOD PRESSURE: 80 MMHG

## 2021-02-04 DIAGNOSIS — N89.8 VAGINAL ITCHING: ICD-10-CM

## 2021-02-04 DIAGNOSIS — E66.01 MORBID OBESITY (HCC): ICD-10-CM

## 2021-02-04 DIAGNOSIS — Z01.419 WELL WOMAN EXAM WITH ROUTINE GYNECOLOGICAL EXAM: ICD-10-CM

## 2021-02-04 DIAGNOSIS — Z12.4 SCREENING FOR CERVICAL CANCER: ICD-10-CM

## 2021-02-04 PROCEDURE — 88175 CYTOPATH C/V AUTO FLUID REDO: CPT

## 2021-02-04 PROCEDURE — 87624 HPV HI-RISK TYP POOLED RSLT: CPT

## 2021-02-04 PROCEDURE — 87591 N.GONORRHOEAE DNA AMP PROB: CPT

## 2021-02-04 PROCEDURE — 87510 GARDNER VAG DNA DIR PROBE: CPT

## 2021-02-04 PROCEDURE — 99000 SPECIMEN HANDLING OFFICE-LAB: CPT | Performed by: NURSE PRACTITIONER

## 2021-02-04 PROCEDURE — 87480 CANDIDA DNA DIR PROBE: CPT

## 2021-02-04 PROCEDURE — 87660 TRICHOMONAS VAGIN DIR PROBE: CPT

## 2021-02-04 PROCEDURE — 87491 CHLMYD TRACH DNA AMP PROBE: CPT

## 2021-02-04 PROCEDURE — 99396 PREV VISIT EST AGE 40-64: CPT | Performed by: NURSE PRACTITIONER

## 2021-02-04 RX ORDER — PHENTERMINE HYDROCHLORIDE 37.5 MG/1
37.5 TABLET ORAL
Qty: 30 TAB | Refills: 0 | Status: SHIPPED | OUTPATIENT
Start: 2021-02-06 | End: 2021-03-31 | Stop reason: SDUPTHER

## 2021-02-04 RX ORDER — SULFAMETHOXAZOLE AND TRIMETHOPRIM 800; 160 MG/1; MG/1
1 TABLET ORAL 2 TIMES DAILY
Qty: 20 TAB | Refills: 1 | Status: SHIPPED | OUTPATIENT
Start: 2021-02-04 | End: 2021-03-31

## 2021-02-04 ASSESSMENT — FIBROSIS 4 INDEX: FIB4 SCORE: 0.58

## 2021-02-05 DIAGNOSIS — N89.8 VAGINAL ITCHING: ICD-10-CM

## 2021-02-05 DIAGNOSIS — Z12.4 SCREENING FOR CERVICAL CANCER: ICD-10-CM

## 2021-02-05 LAB
CANDIDA DNA VAG QL PROBE+SIG AMP: NEGATIVE
G VAGINALIS DNA VAG QL PROBE+SIG AMP: NEGATIVE
T VAGINALIS DNA VAG QL PROBE+SIG AMP: NEGATIVE

## 2021-02-05 NOTE — ASSESSMENT & PLAN NOTE
Weight is down 8 pounds since last visit. Phentermine is helping.  Intermittent fasting.  Gym 3x week.

## 2021-02-05 NOTE — ASSESSMENT & PLAN NOTE
Here for well woman  It has been 10 years since last exam.  Has mammogram scheduled.  Reported surgical birth control.  Has multiple partners.  Would like check for STD.  Will test GC and chlamydia along with HPV and PAP.  No reported breast issues.    Reports occasional sore on outer labia that she treats with hot compresses.  Diabetic.  Working on weight loss.  Has some vaginal itching.

## 2021-02-05 NOTE — PROGRESS NOTES
CC:  Pap/Well Woman Exam    History of present illness:  Marisabel Simons is 40 y.o.white female presenting today for well woman exam with gynecological exam and Pap smear.   She reports her periods are regular.  She is currently using  surgical as birth control. Diet currently on phentermine, down 8 pounds this month. Exercise gym 3 days/week    Morbid obesity (HCC)  Weight is down 8 pounds since last visit. Phentermine is helping.  Intermittent fasting.  Gym 3x week.     Well woman exam with routine gynecological exam  Here for well woman  It has been 10 years since last exam.  Has mammogram scheduled.  Reported surgical birth control.  Has multiple partners.  Would like check for STD.  Will test GC and chlamydia along with HPV and PAP.  No reported breast issues.    Reports occasional sore on outer labia that she treats with hot compresses.  Diabetic.  Working on weight loss.  Has some vaginal itching.       Past Medical History:   Diagnosis Date   • Asthma     child, occasional albuterol now.    • DM (diabetes mellitus) (HCC) 02/03/2020    diet controlled       Past Surgical History:   Procedure Laterality Date   • PB OPEN TX RADIAL & ULNAR SHAFT FX FIX RADIUS A*  2/4/2020    Procedure: ORIF, FRACTURE, RADIUS AND ULNA;  Surgeon: Ambrosio Lara M.D.;  Location: SURGERY [facility];  Service: General   • WRIST ORIF Left 2/4/2020    Procedure: ORIF, WRIST- DISTAL RADIUS;  Surgeon: Ambrosio Lara M.D.;  Location: SURGERY [facility];  Service: General   • GYN SURGERY  2007/2010    c/s x2   • TUBAL COAGULATION LAPAROSCOPIC BILATERAL         Outpatient Encounter Medications as of 2/4/2021   Medication Sig Dispense Refill   • sulfamethoxazole-trimethoprim (BACTRIM DS) 800-160 MG tablet Take 1 Tab by mouth 2 times a day. 20 Tab 1   • Dapagliflozin Propanediol (FARXIGA) 5 MG Tab Take 1 tablet by mouth every day. 30 Tab 6   • Dulaglutide 0.75 MG/0.5ML Solution Pen-injector Inject 0.5 mL under the skin every  7 days. 12 Each 3   • phentermine (ADIPEX-P) 37.5 MG tablet Take 1 Tab by mouth every morning before breakfast for 30 days. 30 Tab 0   • VITAMIN D-VITAMIN K PO Take 5,000 Units by mouth.     • Probiotic Product (PROBIOTIC DAILY PO) Take  by mouth.     • atorvastatin (LIPITOR) 20 MG Tab Take 1 Tab by mouth every day. 90 Tab 3   • metFORMIN ER (GLUCOPHAGE XR) 500 MG TABLET SR 24 HR Take 2 Tabs by mouth every day. 180 Tab 3   • albuterol 108 (90 Base) MCG/ACT Aero Soln inhalation aerosol Inhale 2 Puffs by mouth every four hours as needed for Shortness of Breath. 1 Each 2   • Blood Glucose Test Strips Test strips order: Test strips for  Insurance Preference. meter. Sig: use BID and prn ssx high or low sugar 200 Each 0   • Lancets Lancets order: Lancets for f Insurance Preference. meter. Sig: use BID and prn ssx high or low sugar. #100 RF x 3 200 Each 0   • Blood Glucose Monitoring Suppl Device Meter: Dispense Device of Insurance Preference. Sig. Use as directed for blood sugar monitoring. #1. NR. 1 Each 0     No facility-administered encounter medications on file as of 2/4/2021.        Patient Active Problem List    Diagnosis Date Noted   • Well woman exam with routine gynecological exam 02/04/2021   • Vaginal itching 02/04/2021   • Elevated cholesterol/high density lipoprotein ratio 09/15/2020   • Closed fracture of left wrist 09/01/2020   • Type 2 diabetes mellitus without complication, without long-term current use of insulin ([facility]) 09/01/2020   • Mild intermittent asthma without complication 09/01/2020   • Morbid obesity ([facility]) 09/01/2020       .  Social History     Socioeconomic History   • Marital status:      Spouse name: Not on file   • Number of children: Not on file   • Years of education: Not on file   • Highest education level: Not on file   Occupational History   • Not on file   Social Needs   • Financial resource strain: Not on file   • Food insecurity     Worry: Not on file     Inability: Not on  "file   • Transportation needs     Medical: Not on file     Non-medical: Not on file   Tobacco Use   • Smoking status: Never Smoker   • Smokeless tobacco: Never Used   Substance and Sexual Activity   • Alcohol use: Yes     Frequency: 2-3 times a week     Drinks per session: 7 to 9     Binge frequency: Never     Comment: 3 per week   • Drug use: Yes     Frequency: 1.0 times per week     Types: Marijuana     Comment: edible   • Sexual activity: Yes     Partners: Male     Birth control/protection: Female Sterilization   Lifestyle   • Physical activity     Days per week: Not on file     Minutes per session: Not on file   • Stress: Not on file   Relationships   • Social connections     Talks on phone: Not on file     Gets together: Not on file     Attends Taoist service: Not on file     Active member of club or organization: Not on file     Attends meetings of clubs or organizations: Not on file     Relationship status: Not on file   • Intimate partner violence     Fear of current or ex partner: Not on file     Emotionally abused: Not on file     Physically abused: Not on file     Forced sexual activity: Not on file   Other Topics Concern   • Not on file   Social History Narrative   • Not on file       Family History   Problem Relation Age of Onset   • Diabetes Mother    • Diabetes Father         renal failure   • Heart Disease Father         CHF   • Cancer Father          ROS:  Denies Weight loss, fatigue, chest pain, SOB, bowel or bladder changes. No significant dysmenorrhea, concerning vaginal discharge or irritation, no dyspareunia or postcoital bleeding. Denies h/o migraine with aura. Denies musculoskeletal, neurological, or psychiatric problems.      /80 (BP Location: Left arm, Patient Position: Sitting)   Pulse (!) 114   Temp (!) 35.6 °C (96.1 °F) (Temporal)   Resp 16   Ht 1.64 m (5' 4.57\")   Wt 122 kg (270 lb)   LMP 02/01/2021 (Exact Date)   SpO2 97%   BMI 45.53 kg/m²     GEN:  Appears well and in " no apparent distress   NECK:  Supple without adenopathy or thyromegaly  LUNGS:  Clear and equal. No wheeze, ronchi, or rales.  CV:  RRR, S1, S2. No murmur.  Pedal pulses 2+ bilaterally.  BREAST:  Symmetrical without masses. No nipple discharge.  ABD:  Soft, non-tender, non-distended, normal bowel sounds.  No hepatosplenomegaly.  :  Normal external female genitalia.  Vaginal canal clear.  Cervix appears normal. Specimen collected from transformation zone. Bimanual exam:  No CMT, normal size uterus without masses or tenderness; no adnexal masses or tenderness.      Assessment and plan    1. Morbid obesity (HCC)  Chronic, ongoing, improving. Down 8 pounds. Continue phentermine, diet, exercise.  Return 8 weeks for follow up.  Refill today.     2. Screening for cervical cancer  Pap obtained.  To pathology.  Monitor and follow.  - THINPREP PAP W/HPV AND CTNG; Future    3. Well woman exam with routine gynecological exam  Breast and pelvic exam completed.  Pap to pathology.  STD screening including affirm for r/o yeast/bv.  Monitor and follow.     4. Vaginal itching  See #3  - VAGINAL PATHOGENS DNA PANEL; Future      F/u pending results    A chaperone was offered to the patient during today's exam. Patient declined chaperone.

## 2021-02-07 LAB
C TRACH DNA GENITAL QL NAA+PROBE: NEGATIVE
CYTOLOGY REG CYTOL: ABNORMAL
HPV HR 12 DNA CVX QL NAA+PROBE: POSITIVE
HPV16 DNA SPEC QL NAA+PROBE: NEGATIVE
HPV18 DNA SPEC QL NAA+PROBE: NEGATIVE
N GONORRHOEA DNA GENITAL QL NAA+PROBE: NEGATIVE
SPECIMEN SOURCE: ABNORMAL
SPECIMEN SOURCE: ABNORMAL

## 2021-02-09 ENCOUNTER — PATIENT MESSAGE (OUTPATIENT)
Dept: MEDICAL GROUP | Facility: PHYSICIAN GROUP | Age: 41
End: 2021-02-09

## 2021-02-09 DIAGNOSIS — R87.619 ABNORMAL CERVICAL PAPANICOLAOU SMEAR, UNSPECIFIED ABNORMAL PAP FINDING: ICD-10-CM

## 2021-02-17 ENCOUNTER — TELEPHONE (OUTPATIENT)
Dept: VASCULAR LAB | Facility: MEDICAL CENTER | Age: 41
End: 2021-02-17

## 2021-02-17 ENCOUNTER — PATIENT MESSAGE (OUTPATIENT)
Dept: MEDICAL GROUP | Facility: PHYSICIAN GROUP | Age: 41
End: 2021-02-17

## 2021-02-17 DIAGNOSIS — R30.0 DYSURIA: ICD-10-CM

## 2021-02-17 NOTE — TELEPHONE ENCOUNTER
Called and LVM for pt to re-schedule her missed DM pharmacotherapy f/u appt.    Francis Mitchell, MeraD

## 2021-03-01 ENCOUNTER — PATIENT MESSAGE (OUTPATIENT)
Dept: MEDICAL GROUP | Facility: PHYSICIAN GROUP | Age: 41
End: 2021-03-01

## 2021-03-01 NOTE — TELEPHONE ENCOUNTER
From: Marisabel Simons  To: Nurse Practicioner Ruth Vieyra  Sent: 3/1/2021 10:14 AM PST  Subject: Test Result Question    Just providing an update.     I gave it a few days and cleared up on it's own. I drank tons more water. I did not take the urine test and did not take the Bactrim.     Have a wonderful vacation!      ----- Message -----   From:Nurse Practicioner Ruth Vieyra   Sent:2/17/2021 10:05 AM PST   To:Marisabel Simons   Subject:RE: Test Result Question    Melissa  It would be best if we got a urine first before starting the meds. Bactrim can be used, but sometimes the bacteria in UTI is resistant to the bactrim.  I am sending an order to the lab for you to come by and give a urine sample and they can run that and culture if needed. Once you do the sample, you can start the antibiotic knowing that I may need to change it.  Order is in the computer now  DIPIKA Norman      ----- Message -----   From:Marisabel Simons   Sent:2/17/2021 9:35 AM PST   To:Nurse Practicioner Ruth Vieyra   Subject:Test Result Question    Ant Miranda,    I know my test results came back negative for yeast infection. I did fill the prescription for the antibiotics. Seems like I have developed a UTI, is it ok for me to take those antibiotics for the UTI or should I be taking something else?    BTW - I have an appt. scheduled on 3/31 with Gyno to do a colposcopy.    Thank you,

## 2021-03-04 ENCOUNTER — TELEPHONE (OUTPATIENT)
Dept: MEDICAL GROUP | Facility: MEDICAL CENTER | Age: 41
End: 2021-03-04

## 2021-03-12 DIAGNOSIS — E66.01 MORBID OBESITY (HCC): ICD-10-CM

## 2021-03-12 DIAGNOSIS — E11.9 TYPE 2 DIABETES MELLITUS WITHOUT COMPLICATION, WITHOUT LONG-TERM CURRENT USE OF INSULIN (HCC): ICD-10-CM

## 2021-03-12 RX ORDER — PHENTERMINE HYDROCHLORIDE 37.5 MG/1
37.5 TABLET ORAL
Qty: 30 TABLET | Refills: 0 | OUTPATIENT
Start: 2021-03-12 | End: 2021-04-11

## 2021-03-13 NOTE — TELEPHONE ENCOUNTER
Received request via: Pharmacy    Was the patient seen in the last year in this department? Yes on 02/04/2021    Does the patient have an active prescription (recently filled or refills available) for medication(s) requested? No

## 2021-03-15 RX ORDER — DAPAGLIFLOZIN 5 MG/1
1 TABLET, FILM COATED ORAL DAILY
Qty: 30 TABLET | Refills: 6 | OUTPATIENT
Start: 2021-03-15

## 2021-03-30 ENCOUNTER — NON-PROVIDER VISIT (OUTPATIENT)
Dept: MEDICAL GROUP | Facility: PHYSICIAN GROUP | Age: 41
End: 2021-03-30
Payer: COMMERCIAL

## 2021-03-30 PROCEDURE — 99401 PREV MED CNSL INDIV APPRX 15: CPT | Performed by: STUDENT IN AN ORGANIZED HEALTH CARE EDUCATION/TRAINING PROGRAM

## 2021-03-30 NOTE — PROGRESS NOTES
Patient Consult Note - Follow Up Visit  Primary care physician: DIPIKA Norman    Reason for consult: Management of Uncontrolled Type 2 Diabetes    Time IN:  9:56am  Time OUT:  10:12am    HPI:  Marisabel Simons is a 41 y.o. old patient who comes in today for evaluation of above stated problem.    Most Recent HbA1c:   Lab Results   Component Value Date/Time    HBA1C 11.1 (A) 01/07/2021 12:35 PM        Current Diabetes Regimen:  GLP-1 Agent: Dulaglutide 0.75 mg once weekly   SGLT-2 Inhibitor:  Dapagliflozin 5 mg once daily   Metformin 1000mg BID      Before Breakfast:  No logs to today's visit, she does not have strips for her meter as it is cost prohibitive.  Before Lunch:  Before Dinner:  Before Bedtime:  Other times:  Hypoglycemia:  None    Foot Exam:  Monofilament exam - overdue, will be conducted at next visit.  Monofilament testing with a 10 gram force: sensation intact: decreased bilaterally.    Visual Inspection: Feet without maceration, ulcers, fissures.  Feet dry.  Pedal pulses: intact bilaterally    Preventative Management  BP regimen (ACE/ARB) - none  ASA - none  Statin - Atorvastatin 20mg daily  Last Retinal Scan - 6/2020, has regular f/u with optometrist   Last Foot Exam - unknown, due at next visit.  Last A1c -   Lab Results   Component Value Date/Time    HBA1C 11.1 (A) 01/07/2021 12:35 PM      Last Microalbuminuria - 9/2020    updated caregaps    ROS:  Constitutional: No weight loss  Cardiac: No palpitations or racing heart  Resp: No shortness of breath  Neuro: No numbness or tinging in feet  Endo: No heat or cold intolerance, no polyuria or polydipsia  All other systems were reviewed and were negative.    Past Medical History:  Patient Active Problem List    Diagnosis Date Noted   • Well woman exam with routine gynecological exam 02/04/2021   • Vaginal itching 02/04/2021   • Elevated cholesterol/high density lipoprotein ratio 09/15/2020   • Closed fracture of left wrist 09/01/2020   •  Type 2 diabetes mellitus without complication, without long-term current use of insulin ([facility]) 09/01/2020   • Mild intermittent asthma without complication 09/01/2020   • Morbid obesity ([facility]) 09/01/2020       Past Surgical History:  Past Surgical History:   Procedure Laterality Date   • PB OPEN TX RADIAL & ULNAR SHAFT FX FIX RADIUS A*  2/4/2020    Procedure: ORIF, FRACTURE, RADIUS AND ULNA;  Surgeon: Ambrosio Lara M.D.;  Location: SURGERY [facility];  Service: General   • WRIST ORIF Left 2/4/2020    Procedure: ORIF, WRIST- DISTAL RADIUS;  Surgeon: Ambrosio Lara M.D.;  Location: SURGERY [facility];  Service: General   • GYN SURGERY  2007/2010    c/s x2   • TUBAL COAGULATION LAPAROSCOPIC BILATERAL         Allergies:  Patient has no known allergies.    Social History:  Social History     Socioeconomic History   • Marital status:      Spouse name: Not on file   • Number of children: Not on file   • Years of education: Not on file   • Highest education level: Not on file   Occupational History   • Not on file   Tobacco Use   • Smoking status: Never Smoker   • Smokeless tobacco: Never Used   Substance and Sexual Activity   • Alcohol use: Yes     Comment: 3 per week   • Drug use: Yes     Frequency: 1.0 times per week     Types: Marijuana     Comment: edible   • Sexual activity: Yes     Partners: Male     Birth control/protection: Female Sterilization   Other Topics Concern   • Not on file   Social History Narrative   • Not on file     Social Determinants of Health     Financial Resource Strain:    • Difficulty of Paying Living Expenses:    Food Insecurity:    • Worried About Running Out of Food in the Last Year:    • Ran Out of Food in the Last Year:    Transportation Needs:    • Lack of Transportation (Medical):    • Lack of Transportation (Non-Medical):    Physical Activity:    • Days of Exercise per Week:    • Minutes of Exercise per Session:    Stress:    • Feeling of Stress :    Social  Connections:    • Frequency of Communication with Friends and Family:    • Frequency of Social Gatherings with Friends and Family:    • Attends [employer] Services:    • Active Member of Clubs or Organizations:    • Attends Club or Organization Meetings:    • Marital Status:    Intimate Partner Violence:    • Fear of Current or Ex-Partner:    • Emotionally Abused:    • Physically Abused:    • Sexually Abused:        Family History:  Family History   Problem Relation Age of Onset   • Diabetes Mother    • Diabetes Father         renal failure   • Heart Disease Father         CHF   • Cancer Father        Medications:    Current Outpatient Medications:   •  sulfamethoxazole-trimethoprim (BACTRIM DS) 800-160 MG tablet, Take 1 Tab by mouth 2 times a day., Disp: 20 Tab, Rfl: 1  •  Dapagliflozin Propanediol (FARXIGA) 5 MG Tab, Take 1 tablet by mouth every day., Disp: 30 Tab, Rfl: 6  •  Dulaglutide 0.75 MG/0.5ML Solution Pen-injector, Inject 0.5 mL under the skin every 7 days., Disp: 12 Each, Rfl: 3  •  Blood Glucose Test Strips, Test strips order: Test strips for  Insurance Preference. meter. Sig: use BID and prn ssx high or low sugar, Disp: 200 Each, Rfl: 0  •  Lancets, Lancets order: Lancets for f Insurance Preference. meter. Sig: use BID and prn ssx high or low sugar. #100 RF x 3, Disp: 200 Each, Rfl: 0  •  Blood Glucose Monitoring Suppl Device, Meter: Dispense Device of Insurance Preference. Sig. Use as directed for blood sugar monitoring. #1. NR., Disp: 1 Each, Rfl: 0  •  VITAMIN D-VITAMIN K PO, Take 5,000 Units by mouth., Disp: , Rfl:   •  Probiotic Product (PROBIOTIC DAILY PO), Take  by mouth., Disp: , Rfl:   •  atorvastatin (LIPITOR) 20 MG Tab, Take 1 Tab by mouth every day., Disp: 90 Tab, Rfl: 3  •  metFORMIN ER (GLUCOPHAGE XR) 500 MG TABLET SR 24 HR, Take 2 Tabs by mouth every day., Disp: 180 Tab, Rfl: 3  •  albuterol 108 (90 Base) MCG/ACT Aero Soln inhalation aerosol, Inhale 2 Puffs by mouth every four hours as  needed for Shortness of Breath., Disp: 1 Each, Rfl: 2    Labs: Reviewed    Physical Examination:  Vital signs: There were no vitals taken for this visit. There is no height or weight on file to calculate BMI.  General: No apparent distress, cooperative  Eyes: No scleral icterus or discharge  ENMT: Normal on external inspection of nose, lips, normal thyroid exam  Neck: No abnormal masses on inspection  Resp: Normal effort, clear to auscultation bilaterally   CVS: Regular rate and rhythm, S1 S2 normal, no murmur   Extremities: No edema  Abdomen: abdominal obesity present  Neuro: Alert and oriented  Skin: No rash  Psych: Normal mood and affect, intact memory and able to make informed decisions    Assessment and Plan:    Optimized on metformin.  Tolerating Farxiga.  Tolerating Trulicity.  Patient admits to falling off her good dietary habits during spring break while traveling with family.  Has not been testing FBG as she cannot afford test strips.  Will contact pharmacy to find alternatives.  She has nearly completed three month supply of Trulicity 0.75mg.    Increase Trulicity to 1.5mg every week.  Based on tolerability of Trulicity, will increase Farxiga to 10mg daily in three weeks.  Continue all other medications for now.  Continue healthy eating habits, low carb/high protein.  Continue exercise habits.    Follow Up:  Three weeks by phone, six weeks in clinic.    Thank you for allowing me to participate in the care of this patient.    Jake Hazel, PharmD, BCACP  03/30/21    CC:   DIPIKA Norman

## 2021-03-31 ENCOUNTER — HOSPITAL ENCOUNTER (OUTPATIENT)
Dept: RADIOLOGY | Facility: MEDICAL CENTER | Age: 41
End: 2021-03-31
Attending: NURSE PRACTITIONER
Payer: COMMERCIAL

## 2021-03-31 ENCOUNTER — OFFICE VISIT (OUTPATIENT)
Dept: MEDICAL GROUP | Facility: PHYSICIAN GROUP | Age: 41
End: 2021-03-31
Payer: COMMERCIAL

## 2021-03-31 VITALS
HEIGHT: 65 IN | TEMPERATURE: 96.8 F | HEART RATE: 103 BPM | BODY MASS INDEX: 44.82 KG/M2 | DIASTOLIC BLOOD PRESSURE: 78 MMHG | SYSTOLIC BLOOD PRESSURE: 150 MMHG | OXYGEN SATURATION: 97 % | WEIGHT: 269 LBS

## 2021-03-31 DIAGNOSIS — E11.9 TYPE 2 DIABETES MELLITUS WITHOUT COMPLICATION, WITHOUT LONG-TERM CURRENT USE OF INSULIN (HCC): ICD-10-CM

## 2021-03-31 DIAGNOSIS — E66.01 MORBID OBESITY (HCC): ICD-10-CM

## 2021-03-31 DIAGNOSIS — Z12.31 ENCOUNTER FOR SCREENING MAMMOGRAM FOR MALIGNANT NEOPLASM OF BREAST: ICD-10-CM

## 2021-03-31 PROCEDURE — 77063 BREAST TOMOSYNTHESIS BI: CPT

## 2021-03-31 PROCEDURE — 99214 OFFICE O/P EST MOD 30 MIN: CPT | Performed by: NURSE PRACTITIONER

## 2021-03-31 RX ORDER — DULAGLUTIDE 1.5 MG/.5ML
1 INJECTION, SOLUTION SUBCUTANEOUS
Qty: 6.72 ML | Refills: 1 | Status: SHIPPED | OUTPATIENT
Start: 2021-03-31 | End: 2021-04-02 | Stop reason: CLARIF

## 2021-03-31 RX ORDER — METFORMIN HYDROCHLORIDE 500 MG/1
1000 TABLET, EXTENDED RELEASE ORAL DAILY
Qty: 180 TABLET | Refills: 3 | Status: SHIPPED | OUTPATIENT
Start: 2021-03-31 | End: 2021-07-26 | Stop reason: SDUPTHER

## 2021-03-31 RX ORDER — PHENTERMINE HYDROCHLORIDE 37.5 MG/1
37.5 TABLET ORAL
Qty: 30 TABLET | Refills: 0 | Status: SHIPPED | OUTPATIENT
Start: 2021-05-30 | End: 2021-06-29

## 2021-03-31 RX ORDER — PHENTERMINE HYDROCHLORIDE 37.5 MG/1
37.5 TABLET ORAL
Qty: 30 TABLET | Refills: 0 | Status: SHIPPED | OUTPATIENT
Start: 2021-03-31 | End: 2021-05-04 | Stop reason: SDUPTHER

## 2021-03-31 RX ORDER — PHENTERMINE HYDROCHLORIDE 37.5 MG/1
37.5 TABLET ORAL
Qty: 30 TABLET | Refills: 0 | Status: SHIPPED | OUTPATIENT
Start: 2021-04-30 | End: 2021-07-13 | Stop reason: SDUPTHER

## 2021-03-31 ASSESSMENT — FIBROSIS 4 INDEX: FIB4 SCORE: 0.6

## 2021-03-31 NOTE — ASSESSMENT & PLAN NOTE
Weight down one pound since last visit.  Had vacation to [place] earlier this month.  Would like to go back on the phentermine.

## 2021-03-31 NOTE — ASSESSMENT & PLAN NOTE
Reports recent vacation for spring break.  On farxiga, seeing karma with pharmacy with increased trulicity. Refills on metformin today.  Some darker skin on ankles.  Good pedal pulses, no swelling. Due for labs in July

## 2021-03-31 NOTE — PROGRESS NOTES
Chief Complaint   Patient presents with   • Follow-Up   • Weight Check   • Diabetes       Subjective:   Marisabel Simons is a 41 y.o. female here today for evaluation and management of:    Morbid obesity (HCC)  Weight down one pound since last visit.  Had vacation to [place] earlier this month.  Would like to go back on the phentermine.      Type 2 diabetes mellitus without complication, without long-term current use of insulin (HCC)  Reports recent vacation for spring break.  On farxiga, seeing karma with pharmacy with increased trulicity. Refills on metformin today.  Some darker skin on ankles.  Good pedal pulses, no swelling. Due for labs in July           Current medicines (including changes today)  Current Outpatient Medications   Medication Sig Dispense Refill   • Dulaglutide (TRULICITY) 1.5 MG/0.5ML Solution Pen-injector Inject 1 PEN under the skin every 7 days. 6.72 mL 1   • metFORMIN ER (GLUCOPHAGE XR) 500 MG TABLET SR 24 HR Take 2 Tablets by mouth every day. 180 tablet 3   • phentermine (ADIPEX-P) 37.5 MG tablet Take 1 tablet by mouth every morning before breakfast for 30 days. 30 tablet 0   • [START ON 4/30/2021] phentermine (ADIPEX-P) 37.5 MG tablet Take 1 tablet by mouth every morning before breakfast for 30 days. 30 tablet 0   • [START ON 5/30/2021] phentermine (ADIPEX-P) 37.5 MG tablet Take 1 tablet by mouth every morning before breakfast for 30 days. 30 tablet 0   • Dapagliflozin Propanediol (FARXIGA) 5 MG Tab Take 1 tablet by mouth every day. 30 Tab 6   • VITAMIN D-VITAMIN K PO Take 5,000 Units by mouth.     • Probiotic Product (PROBIOTIC DAILY PO) Take  by mouth.     • atorvastatin (LIPITOR) 20 MG Tab Take 1 Tab by mouth every day. 90 Tab 3   • albuterol 108 (90 Base) MCG/ACT Aero Soln inhalation aerosol Inhale 2 Puffs by mouth every four hours as needed for Shortness of Breath. 1 Each 2   • Blood Glucose Test Strips Test strips order: Test strips for  Insurance Preference. meter. Sig: use BID  "and prn ssx high or low sugar 200 Each 0   • Lancets Lancets order: Lancets for f Insurance Preference. meter. Sig: use BID and prn ssx high or low sugar. #100 RF x 3 200 Each 0   • Blood Glucose Monitoring Suppl Device Meter: Dispense Device of Insurance Preference. Sig. Use as directed for blood sugar monitoring. #1. NR. 1 Each 0     No current facility-administered medications for this visit.     She  has a past medical history of Asthma and DM (diabetes mellitus) ([facility]) (02/03/2020). She also has no past medical history of Hypertension.    ROS as stated in hpi  No chest pain, no shortness of breath, no abdominal pain       Objective:     /78 (BP Location: Left arm, Patient Position: Sitting)   Pulse (!) 103   Temp 36 °C (96.8 °F) (Temporal)   Ht 1.64 m (5' 4.57\")   Wt 122 kg (269 lb)   SpO2 97%  Body mass index is 45.36 kg/m².   Physical Exam:  Constitutional: Alert, no distress.  Skin: Warm, dry, good turgor,no cyanosis, no rashes in visible areas.  Eye: Equal, round and reactive, conjunctiva clear, lids normal.  Neck: Trachea midline, no masses, no obvious thyroid enlargement.. Neuro: Cranial nerves 2-12 grossly intact.  No sensory deficit.  Respiratory: Unlabored respiratory effort, lungs clear to auscultation,   Psych: Alert and oriented x3, normal affect and mood and judgement.        Assessment and Plan:   The following treatment plan was discussed    1. Morbid obesity (HCC)  Chronic, ongoing, improving with diet, exercise and phentermine. Refills today.  No discrepancy on PDMP.  Monitor and follow. 3 month follow up.   - VITAMIN D,25 HYDROXY; Future  - TSH; Future  - phentermine (ADIPEX-P) 37.5 MG tablet; Take 1 tablet by mouth every morning before breakfast for 30 days.  Dispense: 30 tablet; Refill: 0  - phentermine (ADIPEX-P) 37.5 MG tablet; Take 1 tablet by mouth every morning before breakfast for 30 days.  Dispense: 30 tablet; Refill: 0  - phentermine (ADIPEX-P) 37.5 MG tablet; Take 1 tablet " by mouth every morning before breakfast for 30 days.  Dispense: 30 tablet; Refill: 0    2. Type 2 diabetes mellitus without complication, without long-term current use of insulin (HCC)  Chronic, ongoing. Continues to see [facility] for med management.  Increasing trulicity.  Recheck labs in 3 months.  Monitor and follow.   - Diabetic Monofilament LE Exam  - HEMOGLOBIN A1C; Future  - MICROALBUMIN CREAT RATIO URINE; Future  - Lipid Profile; Future  - Comp Metabolic Panel; Future      Followup: Return in about 3 months (around 6/30/2021) for weight management, Labs.         Educated in proper administration of medication(s) ordered today including safety, possible SE, risks, benefits, rationale and alternatives to therapy.     Please note that this dictation was created using voice recognition software. I have made every reasonable attempt to correct obvious errors, but I expect that there are errors of grammar and possibly content that I did not discover before finalizing the note.

## 2021-04-02 DIAGNOSIS — E11.9 TYPE 2 DIABETES MELLITUS WITHOUT COMPLICATION, WITHOUT LONG-TERM CURRENT USE OF INSULIN (HCC): ICD-10-CM

## 2021-04-02 RX ORDER — DULAGLUTIDE 1.5 MG/.5ML
1 INJECTION, SOLUTION SUBCUTANEOUS
Qty: 6.72 ML | Refills: 1 | Status: SHIPPED | OUTPATIENT
Start: 2021-04-02 | End: 2021-12-28

## 2021-04-20 ENCOUNTER — IMMUNIZATION (OUTPATIENT)
Dept: FAMILY PLANNING/WOMEN'S HEALTH CLINIC | Facility: IMMUNIZATION CENTER | Age: 41
End: 2021-04-20
Payer: COMMERCIAL

## 2021-04-20 DIAGNOSIS — Z23 ENCOUNTER FOR VACCINATION: Primary | ICD-10-CM

## 2021-04-20 PROCEDURE — 0001A PFIZER SARS-COV-2 VACCINE: CPT

## 2021-04-20 PROCEDURE — 91300 PFIZER SARS-COV-2 VACCINE: CPT

## 2021-04-22 ENCOUNTER — PATIENT MESSAGE (OUTPATIENT)
Dept: MEDICAL GROUP | Facility: PHYSICIAN GROUP | Age: 41
End: 2021-04-22

## 2021-04-23 ENCOUNTER — TELEPHONE (OUTPATIENT)
Dept: VASCULAR LAB | Facility: MEDICAL CENTER | Age: 41
End: 2021-04-23

## 2021-04-23 NOTE — TELEPHONE ENCOUNTER
LM for patient to call back and discuss tolerability of Trulicity and if she is receiving through mail order pharmacy.  Jake Hazel, PharmD, BCACP    Zulema Hazel, [place]   Hi Jake,     Pt called to let you know Farxiga is going well no adverse reactions or symptoms to increased dose.     Thanks,     Zulema Hazel, [place], BCACP

## 2021-05-04 DIAGNOSIS — E66.01 MORBID OBESITY (HCC): ICD-10-CM

## 2021-05-04 RX ORDER — PHENTERMINE HYDROCHLORIDE 37.5 MG/1
37.5 TABLET ORAL
Qty: 30 TABLET | Refills: 0 | Status: SHIPPED | OUTPATIENT
Start: 2021-05-04 | End: 2021-06-08 | Stop reason: SDUPTHER

## 2021-05-04 NOTE — TELEPHONE ENCOUNTER
Received request via: Pharmacy    Was the patient seen in the last year in this department? Yes 3/31/21    Does the patient have an active prescription (recently filled or refills available) for medication(s) requested? No

## 2021-05-04 NOTE — TELEPHONE ENCOUNTER
Requested Prescriptions     Signed Prescriptions Disp Refills   • phentermine (ADIPEX-P) 37.5 MG tablet 30 tablet 0     Sig: Take 1 tablet by mouth every morning before breakfast for 30 days.     Authorizing Provider: DHRUV KAPADIA A.P.R.N.

## 2021-05-13 ENCOUNTER — IMMUNIZATION (OUTPATIENT)
Dept: FAMILY PLANNING/WOMEN'S HEALTH CLINIC | Facility: IMMUNIZATION CENTER | Age: 41
End: 2021-05-13
Payer: COMMERCIAL

## 2021-05-13 DIAGNOSIS — Z23 ENCOUNTER FOR VACCINATION: Primary | ICD-10-CM

## 2021-05-13 PROCEDURE — 91300 PFIZER SARS-COV-2 VACCINE: CPT

## 2021-05-13 PROCEDURE — 0002A PFIZER SARS-COV-2 VACCINE: CPT

## 2021-05-25 ENCOUNTER — TELEPHONE (OUTPATIENT)
Dept: VASCULAR LAB | Facility: MEDICAL CENTER | Age: 41
End: 2021-05-25

## 2021-05-25 DIAGNOSIS — E11.9 TYPE 2 DIABETES MELLITUS WITHOUT COMPLICATION, WITHOUT LONG-TERM CURRENT USE OF INSULIN (HCC): ICD-10-CM

## 2021-05-25 RX ORDER — DAPAGLIFLOZIN 10 MG/1
1 TABLET, FILM COATED ORAL DAILY
Qty: 90 TABLET | Refills: 1 | Status: SHIPPED | OUTPATIENT
Start: 2021-05-25 | End: 2021-12-28

## 2021-05-25 RX ORDER — DAPAGLIFLOZIN 5 MG/1
1 TABLET, FILM COATED ORAL DAILY
Qty: 30 TABLET | Refills: 6 | Status: CANCELLED | OUTPATIENT
Start: 2021-05-25

## 2021-05-25 NOTE — TELEPHONE ENCOUNTER
Process test claim in [place]. No PA is required by the patient's plan. Must fill for 90 days at [facility]. Releasing Rx to [facility].

## 2021-06-08 ENCOUNTER — TELEPHONE (OUTPATIENT)
Dept: MEDICAL GROUP | Facility: PHYSICIAN GROUP | Age: 41
End: 2021-06-08

## 2021-06-08 DIAGNOSIS — E66.01 MORBID OBESITY (HCC): ICD-10-CM

## 2021-06-08 RX ORDER — PHENTERMINE HYDROCHLORIDE 37.5 MG/1
37.5 TABLET ORAL
Qty: 30 TABLET | Refills: 0 | Status: SHIPPED | OUTPATIENT
Start: 2021-06-08 | End: 2021-07-13 | Stop reason: SDUPTHER

## 2021-06-08 NOTE — TELEPHONE ENCOUNTER
Requested Prescriptions     Signed Prescriptions Disp Refills   • phentermine (ADIPEX-P) 37.5 MG tablet 30 tablet 0     Sig: Take 1 tablet by mouth every morning before breakfast for 30 days.     Authorizing Provider: DHRUV KAPADIA     Last filled 5/4/21  PDMP ok  DIPIKA Norman

## 2021-06-08 NOTE — TELEPHONE ENCOUNTER
Phone Number Called: [phone] (home) [phone] (work)    I called the patient and left a message requesting that she call me.  I want to ascertain if the patient monitors her blood pressure at home and, if so, obtain the most recent reading.  If the patient does not monitor at home, I will offer her an MA visit to check her blood pressure.

## 2021-06-08 NOTE — TELEPHONE ENCOUNTER
Received request via: Patient    Was the patient seen in the last year in this department? Yes    Does the patient have an active prescription (recently filled or refills available) for medication(s) requested? DUE 6/29/21 ACCORDING TO EPIC

## 2021-07-13 ENCOUNTER — OFFICE VISIT (OUTPATIENT)
Dept: MEDICAL GROUP | Facility: PHYSICIAN GROUP | Age: 41
End: 2021-07-13
Payer: COMMERCIAL

## 2021-07-13 VITALS
HEART RATE: 84 BPM | RESPIRATION RATE: 16 BRPM | WEIGHT: 264 LBS | TEMPERATURE: 96.8 F | SYSTOLIC BLOOD PRESSURE: 140 MMHG | HEIGHT: 65 IN | OXYGEN SATURATION: 96 % | DIASTOLIC BLOOD PRESSURE: 78 MMHG | BODY MASS INDEX: 43.99 KG/M2

## 2021-07-13 DIAGNOSIS — E11.9 TYPE 2 DIABETES MELLITUS WITHOUT COMPLICATION, WITHOUT LONG-TERM CURRENT USE OF INSULIN (HCC): ICD-10-CM

## 2021-07-13 DIAGNOSIS — E78.6 ELEVATED CHOLESTEROL/HIGH DENSITY LIPOPROTEIN RATIO: ICD-10-CM

## 2021-07-13 DIAGNOSIS — E66.01 MORBID OBESITY (HCC): ICD-10-CM

## 2021-07-13 PROCEDURE — 99213 OFFICE O/P EST LOW 20 MIN: CPT | Performed by: NURSE PRACTITIONER

## 2021-07-13 RX ORDER — PHENTERMINE HYDROCHLORIDE 37.5 MG/1
37.5 TABLET ORAL
Qty: 30 TABLET | Refills: 0 | Status: SHIPPED | OUTPATIENT
Start: 2021-09-11 | End: 2021-09-15 | Stop reason: SDUPTHER

## 2021-07-13 RX ORDER — LISINOPRIL 5 MG/1
5 TABLET ORAL DAILY
Qty: 90 TABLET | Refills: 3 | Status: SHIPPED | OUTPATIENT
Start: 2021-07-13 | End: 2023-03-31 | Stop reason: SDUPTHER

## 2021-07-13 RX ORDER — PHENTERMINE HYDROCHLORIDE 37.5 MG/1
37.5 TABLET ORAL
Qty: 30 TABLET | Refills: 0 | Status: SHIPPED | OUTPATIENT
Start: 2021-08-12 | End: 2021-09-11

## 2021-07-13 RX ORDER — PHENTERMINE HYDROCHLORIDE 37.5 MG/1
37.5 TABLET ORAL
Qty: 30 TABLET | Refills: 0 | Status: SHIPPED | OUTPATIENT
Start: 2021-07-13 | End: 2021-08-12

## 2021-07-13 ASSESSMENT — FIBROSIS 4 INDEX: FIB4 SCORE: 0.6

## 2021-07-13 NOTE — ASSESSMENT & PLAN NOTE
Due for labs.  On farxiga/trulicity and metformin. Weight is down 5 pounds.  Watching carbs.  Walking around [place] 3 times/week.  Not currently checking blood sugars.  Seeing Jake in pharmacy.  Due for updated appointment and labs.

## 2021-07-13 NOTE — PROGRESS NOTES
Chief Complaint   Patient presents with   • Follow-Up   • Diabetes   • Hypertension       Subjective:   Marisabel Simons is a 41 y.o. female here today for evaluation and management of:    Type 2 diabetes mellitus without complication, without long-term current use of insulin (HCC)  Due for labs.  On farxiga/trulicity and metformin. Weight is down 5 pounds.  Watching carbs.  Walking around [place] 3 times/week.  Not currently checking blood sugars.  Seeing Jake in pharmacy.  Due for updated appointment and labs.      Morbid obesity (HCC)  BMI 44.62 today.  walkiing daily  Taking phentermine.     Elevated cholesterol/high density lipoprotein ratio  Due for lipids.  Has been on atorvastatin  Walking regularly.          Current medicines (including changes today)  Current Outpatient Medications   Medication Sig Dispense Refill   • lisinopril (PRINIVIL) 5 MG Tab Take 1 tablet by mouth every day. 90 tablet 3   • phentermine (ADIPEX-P) 37.5 MG tablet Take 1 tablet by mouth every morning before breakfast for 30 days. 30 tablet 0   • [START ON 8/12/2021] phentermine (ADIPEX-P) 37.5 MG tablet Take 1 tablet by mouth every morning before breakfast for 30 days. 30 tablet 0   • [START ON 9/11/2021] phentermine (ADIPEX-P) 37.5 MG tablet Take 1 tablet by mouth every morning before breakfast for 30 days. 30 tablet 0   • Dapagliflozin Propanediol (FARXIGA) 10 MG Tab Take 1 tablet by mouth every day. 90 tablet 1   • Dulaglutide (TRULICITY) 1.5 MG/0.5ML Solution Pen-injector Inject 1 PEN under the skin every 7 days. 6.72 mL 1   • metFORMIN ER (GLUCOPHAGE XR) 500 MG TABLET SR 24 HR Take 2 Tablets by mouth every day. 180 tablet 3   • VITAMIN D-VITAMIN K PO Take 5,000 Units by mouth.     • Probiotic Product (PROBIOTIC DAILY PO) Take  by mouth.     • atorvastatin (LIPITOR) 20 MG Tab Take 1 Tab by mouth every day. 90 Tab 3   • albuterol 108 (90 Base) MCG/ACT Aero Soln inhalation aerosol Inhale 2 Puffs by mouth every four hours  "as needed for Shortness of Breath. 1 Each 2   • Lancets Lancets order: Lancets for f Insurance Preference. meter. Sig: use BID and prn ssx high or low sugar. #100 RF x 3 200 Each 0   • Blood Glucose Monitoring Suppl Device Meter: Dispense Device of Insurance Preference. Sig. Use as directed for blood sugar monitoring. #1. NR. 1 Each 0     No current facility-administered medications for this visit.     She  has a past medical history of Asthma and DM (diabetes mellitus) ([facility]) (02/03/2020). She also has no past medical history of Hypertension.    ROS as stated inhpi  No chest pain, no shortness of breath, no abdominal pain       Objective:     /78 (BP Location: Left arm, Patient Position: Sitting)   Pulse 84   Temp 36 °C (96.8 °F)   Resp 16   Ht 1.638 m (5' 4.5\")   Wt 120 kg (264 lb)   SpO2 96%  Body mass index is 44.62 kg/m². weight is malgorzata 5 pounds.    Physical Exam:  Constitutional: Alert, no distress.  Skin: Warm, dry, good turgor,no cyanosis, no rashes in visible areas.  Eye: Equal, round and reactive, conjunctiva clear, lids normal.  Ears: No tenderness, no discharge.  External canals are without any significant edema or erythema.  Tympanic membranes are without any inflammation, no effusion.  Gross auditory acuity is intact.  Nose: symmetrical without tenderness, no discharge.  Mouth/Throat: lips without lesion.  Oropharynx clear.  Throat without erythema, exudates or tonsillar enlargement.  Neck: Trachea midline, no masses, no obvious thyroid enlargement.. No cervical or supraclavicular lymphadenopathy. Range of motion within normal limits.  Neuro: Cranial nerves 2-12 grossly intact.  No sensory deficit.  Respiratory: Unlabored respiratory effort, lungs clear to auscultation, no wheezes, no ronchi.  Cardiovascular: Normal S1, S2, no murmur, no edema.  Abdomen: Soft, non-tender, no masses, no guarding,  no hepatosplenomegaly.  Psych: Alert and oriented x3, normal affect and mood and " judgement.        Assessment and Plan:   The following treatment plan was discussed    1. Type 2 diabetes mellitus without complication, without long-term current use of insulin (HCC)  Chronic, ongoing.  Continue with current meds.  Continue to consult with pharmacy, Jake.  Due for appointment.  Continue walking and weight loss.  ACE added for kidney protection today.  Due for retina exam.  Encouraged appointment.  Encouraged to reach out to insurance/pharmacy regarding monitor and strips for better coverage and compliance with daily fingersticks.  Monitor and follow.  3 month return.     2. Morbid obesity (HCC)  Chronic, ongoing. Improving.  Encouraged daily dosing of phentermine, not intermittent.  Refills today.  Continue to portion control and walk.  Monitor and follow.   - phentermine (ADIPEX-P) 37.5 MG tablet; Take 1 tablet by mouth every morning before breakfast for 30 days.  Dispense: 30 tablet; Refill: 0  - phentermine (ADIPEX-P) 37.5 MG tablet; Take 1 tablet by mouth every morning before breakfast for 30 days.  Dispense: 30 tablet; Refill: 0  - phentermine (ADIPEX-P) 37.5 MG tablet; Take 1 tablet by mouth every morning before breakfast for 30 days.  Dispense: 30 tablet; Refill: 0    3. Elevated cholesterol/high density lipoprotein ratio  Chronic, ongoing, due for labs.  Continue with atorvastatin.  Continue to work on weight loss and exercise.  Monitor and follow.       Followup: No follow-ups on file.         Educated in proper administration of medication(s) ordered today including safety, possible SE, risks, benefits, rationale and alternatives to therapy.     Please note that this dictation was created using voice recognition software. I have made every reasonable attempt to correct obvious errors, but I expect that there are errors of grammar and possibly content that I did not discover before finalizing the note.

## 2021-07-15 RX ORDER — ATORVASTATIN CALCIUM 20 MG/1
20 TABLET, FILM COATED ORAL DAILY
Qty: 90 TABLET | Refills: 3 | Status: SHIPPED | OUTPATIENT
Start: 2021-07-15 | End: 2021-11-04 | Stop reason: SDUPTHER

## 2021-07-15 NOTE — TELEPHONE ENCOUNTER
Received request via: Pharmacy    Was the patient seen in the last year in this department? Yes    Does the patient have an active prescription (recently filled or refills available) for medication(s) requested? CHANGING PHARMACIES

## 2021-07-15 NOTE — TELEPHONE ENCOUNTER
Requested Prescriptions     Signed Prescriptions Disp Refills   • atorvastatin (LIPITOR) 20 MG Tab 90 tablet 3     Sig: Take 1 tablet by mouth every day.     Authorizing Provider: DHRUV KAPADIA A.P.R.N.

## 2021-07-28 RX ORDER — METFORMIN HYDROCHLORIDE 500 MG/1
1000 TABLET, EXTENDED RELEASE ORAL DAILY
Qty: 180 TABLET | Refills: 1 | Status: SHIPPED | OUTPATIENT
Start: 2021-07-28 | End: 2021-12-28

## 2021-07-28 NOTE — TELEPHONE ENCOUNTER
Received request via: Pharmacy    Was the patient seen in the last year in this department? Yes    Does the patient have an active prescription (recently filled or refills available) for medication(s) requested? Switching to mail order

## 2021-07-29 NOTE — TELEPHONE ENCOUNTER
Requested Prescriptions     Signed Prescriptions Disp Refills   • metFORMIN ER (GLUCOPHAGE XR) 500 MG TABLET SR 24  tablet 1     Sig: Take 2 Tablets by mouth every day.     Authorizing Provider: DHRUV KAPADIA A.P.R.N.

## 2021-08-18 DIAGNOSIS — E66.01 MORBID OBESITY (HCC): ICD-10-CM

## 2021-08-18 RX ORDER — PHENTERMINE HYDROCHLORIDE 37.5 MG/1
TABLET ORAL
Qty: 30 TABLET | Refills: 0 | OUTPATIENT
Start: 2021-08-18

## 2021-08-24 ENCOUNTER — PATIENT MESSAGE (OUTPATIENT)
Dept: MEDICAL GROUP | Facility: PHYSICIAN GROUP | Age: 41
End: 2021-08-24

## 2021-08-24 RX ORDER — ALBUTEROL SULFATE 90 UG/1
2 AEROSOL, METERED RESPIRATORY (INHALATION) EVERY 4 HOURS PRN
Qty: 1 EACH | Refills: 3 | Status: SHIPPED | OUTPATIENT
Start: 2021-08-24 | End: 2022-04-08 | Stop reason: SDUPTHER

## 2021-08-24 NOTE — TELEPHONE ENCOUNTER
Requested Prescriptions     Signed Prescriptions Disp Refills   • albuterol 108 (90 Base) MCG/ACT Aero Soln inhalation aerosol 1 Each 3     Sig: Inhale 2 Puffs every four hours as needed for Shortness of Breath.     Authorizing Provider: DHRUV KAPADIA A.P.R.N.

## 2021-09-15 ENCOUNTER — PATIENT MESSAGE (OUTPATIENT)
Dept: MEDICAL GROUP | Facility: PHYSICIAN GROUP | Age: 41
End: 2021-09-15

## 2021-09-15 DIAGNOSIS — E66.01 MORBID OBESITY (HCC): ICD-10-CM

## 2021-09-15 RX ORDER — PHENTERMINE HYDROCHLORIDE 37.5 MG/1
37.5 TABLET ORAL
Qty: 90 TABLET | Refills: 0 | Status: SHIPPED | OUTPATIENT
Start: 2021-09-15 | End: 2021-12-14

## 2021-09-15 RX ORDER — PHENTERMINE HYDROCHLORIDE 37.5 MG/1
37.5 TABLET ORAL
Qty: 30 TABLET | Refills: 0 | Status: CANCELLED | OUTPATIENT
Start: 2021-10-16 | End: 2021-11-15

## 2021-09-15 RX ORDER — PHENTERMINE HYDROCHLORIDE 37.5 MG/1
37.5 TABLET ORAL
Qty: 30 TABLET | Refills: 0 | Status: CANCELLED | OUTPATIENT
Start: 2021-11-16 | End: 2021-12-16

## 2021-09-15 NOTE — TELEPHONE ENCOUNTER
Received request via: Patient    Was the patient seen in the last year in this department? Yes    Does the patient have an active prescription (recently filled or refills available) for medication(s) requested? Patient now using [facility]

## 2021-09-15 NOTE — TELEPHONE ENCOUNTER
Requested Prescriptions     Signed Prescriptions Disp Refills   • phentermine (ADIPEX-P) 37.5 MG tablet 90 Tablet 0     Sig: Take 1 Tablet by mouth every morning before breakfast for 90 days.     Authorizing Provider: DHRUV KAPADIA     Sent to [facility] pharmacy per patient request  RUY Norman.

## 2021-11-04 RX ORDER — ATORVASTATIN CALCIUM 20 MG/1
20 TABLET, FILM COATED ORAL DAILY
Qty: 90 TABLET | Refills: 1 | Status: SHIPPED | OUTPATIENT
Start: 2021-11-04 | End: 2022-04-08 | Stop reason: SDUPTHER

## 2021-11-04 NOTE — TELEPHONE ENCOUNTER
Received request via: Patient    Was the patient seen in the last year in this department? Yes    Does the patient have an active prescription (recently filled or refills available) for medication(s) requested? REQUESTING THROUGH MAIL ORDER PHARMACY

## 2021-11-04 NOTE — TELEPHONE ENCOUNTER
Requested Prescriptions     Signed Prescriptions Disp Refills   • atorvastatin (LIPITOR) 20 MG Tab 90 Tablet 1     Sig: Take 1 Tablet by mouth every day.     Authorizing Provider: DHRUV KAPADIA A.P.R.N.

## 2021-11-05 ENCOUNTER — HOSPITAL ENCOUNTER (OUTPATIENT)
Dept: LAB | Facility: MEDICAL CENTER | Age: 41
End: 2021-11-05
Attending: NURSE PRACTITIONER
Payer: COMMERCIAL

## 2021-11-05 DIAGNOSIS — E11.9 TYPE 2 DIABETES MELLITUS WITHOUT COMPLICATION, WITHOUT LONG-TERM CURRENT USE OF INSULIN (HCC): ICD-10-CM

## 2021-11-05 DIAGNOSIS — R30.0 DYSURIA: ICD-10-CM

## 2021-11-05 DIAGNOSIS — E66.01 MORBID OBESITY (HCC): ICD-10-CM

## 2021-11-05 LAB
25(OH)D3 SERPL-MCNC: 24 NG/ML (ref 30–100)
ALBUMIN SERPL BCP-MCNC: 4.1 G/DL (ref 3.2–4.9)
ALBUMIN/GLOB SERPL: 1.5 G/DL
ALP SERPL-CCNC: 95 U/L (ref 30–99)
ALT SERPL-CCNC: 34 U/L (ref 2–50)
ANION GAP SERPL CALC-SCNC: 9 MMOL/L (ref 7–16)
APPEARANCE UR: ABNORMAL
AST SERPL-CCNC: 34 U/L (ref 12–45)
BACTERIA #/AREA URNS HPF: ABNORMAL /HPF
BILIRUB SERPL-MCNC: 0.2 MG/DL (ref 0.1–1.5)
BILIRUB UR QL STRIP.AUTO: NEGATIVE
BUN SERPL-MCNC: 11 MG/DL (ref 8–22)
CALCIUM SERPL-MCNC: 9 MG/DL (ref 8.5–10.5)
CAOX CRY #/AREA URNS HPF: ABNORMAL /HPF
CHLORIDE SERPL-SCNC: 99 MMOL/L (ref 96–112)
CHOLEST SERPL-MCNC: 237 MG/DL (ref 100–199)
CO2 SERPL-SCNC: 25 MMOL/L (ref 20–33)
COLOR UR: YELLOW
CREAT SERPL-MCNC: 0.57 MG/DL (ref 0.5–1.4)
CREAT UR-MCNC: 147.21 MG/DL
EPI CELLS #/AREA URNS HPF: ABNORMAL /HPF
EST. AVERAGE GLUCOSE BLD GHB EST-MCNC: 217 MG/DL
FASTING STATUS PATIENT QL REPORTED: NORMAL
GLOBULIN SER CALC-MCNC: 2.8 G/DL (ref 1.9–3.5)
GLUCOSE SERPL-MCNC: 266 MG/DL (ref 65–99)
GLUCOSE UR STRIP.AUTO-MCNC: >=1000 MG/DL
HBA1C MFR BLD: 9.2 % (ref 4–5.6)
HDLC SERPL-MCNC: 87 MG/DL
HYALINE CASTS #/AREA URNS LPF: ABNORMAL /LPF
KETONES UR STRIP.AUTO-MCNC: ABNORMAL MG/DL
LDLC SERPL CALC-MCNC: 126 MG/DL
LEUKOCYTE ESTERASE UR QL STRIP.AUTO: NEGATIVE
MICRO URNS: ABNORMAL
MICROALBUMIN UR-MCNC: 10.6 MG/DL
MICROALBUMIN/CREAT UR: 72 MG/G (ref 0–30)
NITRITE UR QL STRIP.AUTO: NEGATIVE
PH UR STRIP.AUTO: 6 [PH] (ref 5–8)
POTASSIUM SERPL-SCNC: 4.8 MMOL/L (ref 3.6–5.5)
PROT SERPL-MCNC: 6.9 G/DL (ref 6–8.2)
PROT UR QL STRIP: 30 MG/DL
RBC # URNS HPF: ABNORMAL /HPF
RBC UR QL AUTO: ABNORMAL
SODIUM SERPL-SCNC: 133 MMOL/L (ref 135–145)
SP GR UR STRIP.AUTO: 1.03
TRIGL SERPL-MCNC: 118 MG/DL (ref 0–149)
TSH SERPL DL<=0.005 MIU/L-ACNC: 1.54 UIU/ML (ref 0.38–5.33)
UROBILINOGEN UR STRIP.AUTO-MCNC: 0.2 MG/DL
WBC #/AREA URNS HPF: ABNORMAL /HPF

## 2021-11-05 PROCEDURE — 36415 COLL VENOUS BLD VENIPUNCTURE: CPT

## 2021-11-05 PROCEDURE — 81001 URINALYSIS AUTO W/SCOPE: CPT

## 2021-11-05 PROCEDURE — 83036 HEMOGLOBIN GLYCOSYLATED A1C: CPT

## 2021-11-05 PROCEDURE — 82570 ASSAY OF URINE CREATININE: CPT

## 2021-11-05 PROCEDURE — 80053 COMPREHEN METABOLIC PANEL: CPT

## 2021-11-05 PROCEDURE — 82306 VITAMIN D 25 HYDROXY: CPT

## 2021-11-05 PROCEDURE — 80061 LIPID PANEL: CPT

## 2021-11-05 PROCEDURE — 84443 ASSAY THYROID STIM HORMONE: CPT

## 2021-11-05 PROCEDURE — 82043 UR ALBUMIN QUANTITATIVE: CPT

## 2021-11-08 ENCOUNTER — OFFICE VISIT (OUTPATIENT)
Dept: MEDICAL GROUP | Facility: PHYSICIAN GROUP | Age: 41
End: 2021-11-08
Payer: COMMERCIAL

## 2021-11-08 VITALS
HEIGHT: 65 IN | WEIGHT: 270 LBS | DIASTOLIC BLOOD PRESSURE: 76 MMHG | OXYGEN SATURATION: 98 % | TEMPERATURE: 97.2 F | SYSTOLIC BLOOD PRESSURE: 146 MMHG | HEART RATE: 82 BPM | BODY MASS INDEX: 44.98 KG/M2 | RESPIRATION RATE: 18 BRPM

## 2021-11-08 DIAGNOSIS — E11.9 TYPE 2 DIABETES MELLITUS WITHOUT COMPLICATION, WITHOUT LONG-TERM CURRENT USE OF INSULIN (HCC): ICD-10-CM

## 2021-11-08 DIAGNOSIS — E66.01 MORBID OBESITY (HCC): ICD-10-CM

## 2021-11-08 PROCEDURE — 99212 OFFICE O/P EST SF 10 MIN: CPT | Performed by: NURSE PRACTITIONER

## 2021-11-08 ASSESSMENT — FIBROSIS 4 INDEX: FIB4 SCORE: 0.88

## 2021-11-08 NOTE — ASSESSMENT & PLAN NOTE
A1c 9.2.  Unable to afford medications due to a very difficult divorce, custody carrillo and financial concerns.  Taking metformin 1000 mg ER daily.

## 2021-11-08 NOTE — PROGRESS NOTES
Chief Complaint   Patient presents with   • Follow-Up   • Lab Results       Subjective:   Marisabel Simons is a 41 y.o. female here today for evaluation and management of:    Morbid obesity (HCC)  BMI 45.63.  Would like to see bariatric for lap banding.  Referral placed.     Type 2 diabetes mellitus without complication, without long-term current use of insulin (HCC)  A1c 9.2.  Unable to afford medications due to a very difficult divorce, custody carrillo and financial concerns.  Taking metformin 1000 mg ER daily.             Current medicines (including changes today)  Current Outpatient Medications   Medication Sig Dispense Refill   • atorvastatin (LIPITOR) 20 MG Tab Take 1 Tablet by mouth every day. 90 Tablet 1   • phentermine (ADIPEX-P) 37.5 MG tablet Take 1 Tablet by mouth every morning before breakfast for 90 days. 90 Tablet 0   • albuterol 108 (90 Base) MCG/ACT Aero Soln inhalation aerosol Inhale 2 Puffs every four hours as needed for Shortness of Breath. 1 Each 3   • metFORMIN ER (GLUCOPHAGE XR) 500 MG TABLET SR 24 HR Take 2 Tablets by mouth every day. 180 tablet 1   • lisinopril (PRINIVIL) 5 MG Tab Take 1 tablet by mouth every day. 90 tablet 3   • Probiotic Product (PROBIOTIC DAILY PO) Take  by mouth.     • Dapagliflozin Propanediol (FARXIGA) 10 MG Tab Take 1 tablet by mouth every day. (Patient not taking: Reported on 11/8/2021) 90 tablet 1   • Dulaglutide (TRULICITY) 1.5 MG/0.5ML Solution Pen-injector Inject 1 PEN under the skin every 7 days. (Patient not taking: Reported on 11/8/2021) 6.72 mL 1   • Lancets Lancets order: Lancets for f Insurance Preference. meter. Sig: use BID and prn ssx high or low sugar. #100 RF x 3 200 Each 0   • Blood Glucose Monitoring Suppl Device Meter: Dispense Device of Insurance Preference. Sig. Use as directed for blood sugar monitoring. #1. NR. 1 Each 0   • VITAMIN D-VITAMIN K PO Take 5,000 Units by mouth. (Patient not taking: Reported on 11/8/2021)       No current  "facility-administered medications for this visit.     She  has a past medical history of Asthma and DM (diabetes mellitus) ([facility]) (02/03/2020). She also has no past medical history of Hypertension.    ROS as stated in hpi  No chest pain, no shortness of breath, no abdominal pain       Objective:     /76 (BP Location: Left arm, Patient Position: Sitting)   Pulse 82   Temp 36.2 °C (97.2 °F) (Temporal)   Resp 18   Ht 1.638 m (5' 4.5\")   Wt 122 kg (270 lb)   SpO2 98%  Body mass index is 45.63 kg/m².   Physical Exam:  Constitutional: Alert, no distress.  Skin: Warm, dry, good turgor,no cyanosis, no rashes in visible areas.  Eye: Equal, round and reactive, conjunctiva clear, lids normal.  Neck: Trachea midline, no masses, no obvious thyroid enlargement.. No cervical or supraclavicular lymphadenopathy. Range of motion within normal limits.  Neuro: Cranial nerves 2-12 grossly intact.  No sensory deficit.  Respiratory: Unlabored respiratory effort,Psych: Alert and oriented x3, normal affect and mood and judgement.        Assessment and Plan:   The following treatment plan was discussed    1. Morbid obesity (HCC)  Chjronic, ongoing, worsening.  Referral to bariatric.   - Referral to Bariatric Surgery    2. Type 2 diabetes mellitus without complication, without long-term current use of insulin (HCC)  Chronic, ongoing, not able to afford trulicity and farxiga due to contentious divorce.  Will refer to pharm    - Referral to Bariatric Surgery      Followup: No follow-ups on file.         Educated in proper administration of medication(s) ordered today including safety, possible SE, risks, benefits, rationale and alternatives to therapy.     Please note that this dictation was created using voice recognition software. I have made every reasonable attempt to correct obvious errors, but I expect that there are errors of grammar and possibly content that I did not discover before finalizing the note.  "

## 2021-11-17 ENCOUNTER — DOCUMENTATION (OUTPATIENT)
Dept: VASCULAR LAB | Facility: MEDICAL CENTER | Age: 41
End: 2021-11-17

## 2021-11-17 NOTE — PROGRESS NOTES
Patient overdue for pharmacotherapy follow up.    Also received message from PCP that patient unable to afford medications at this time.  LM asking that she call back to schedule follow up in clinic.  Jake Hazel, MeraD, BCACP

## 2021-11-18 ENCOUNTER — PATIENT MESSAGE (OUTPATIENT)
Dept: MEDICAL GROUP | Facility: PHYSICIAN GROUP | Age: 41
End: 2021-11-18

## 2021-12-02 ENCOUNTER — HOSPITAL ENCOUNTER (OUTPATIENT)
Facility: MEDICAL CENTER | Age: 41
End: 2021-12-02
Attending: NURSE PRACTITIONER
Payer: COMMERCIAL

## 2021-12-02 ENCOUNTER — OFFICE VISIT (OUTPATIENT)
Dept: MEDICAL GROUP | Facility: PHYSICIAN GROUP | Age: 41
End: 2021-12-02
Payer: COMMERCIAL

## 2021-12-02 VITALS
HEART RATE: 102 BPM | DIASTOLIC BLOOD PRESSURE: 78 MMHG | WEIGHT: 270 LBS | OXYGEN SATURATION: 95 % | SYSTOLIC BLOOD PRESSURE: 140 MMHG | BODY MASS INDEX: 44.98 KG/M2 | TEMPERATURE: 96.9 F | HEIGHT: 65 IN

## 2021-12-02 DIAGNOSIS — N89.8 VAGINAL DISCHARGE: ICD-10-CM

## 2021-12-02 DIAGNOSIS — N89.8 VAGINAL ITCHING: ICD-10-CM

## 2021-12-02 LAB
C TRACH DNA SPEC QL NAA+PROBE: NEGATIVE
N GONORRHOEA DNA SPEC QL NAA+PROBE: NEGATIVE
SPECIMEN SOURCE: NORMAL

## 2021-12-02 PROCEDURE — 87660 TRICHOMONAS VAGIN DIR PROBE: CPT

## 2021-12-02 PROCEDURE — 99213 OFFICE O/P EST LOW 20 MIN: CPT | Performed by: NURSE PRACTITIONER

## 2021-12-02 PROCEDURE — 87491 CHLMYD TRACH DNA AMP PROBE: CPT

## 2021-12-02 PROCEDURE — 87591 N.GONORRHOEAE DNA AMP PROB: CPT

## 2021-12-02 PROCEDURE — 87480 CANDIDA DNA DIR PROBE: CPT

## 2021-12-02 PROCEDURE — 87510 GARDNER VAG DNA DIR PROBE: CPT

## 2021-12-02 RX ORDER — NYSTATIN AND TRIAMCINOLONE ACETONIDE 100000; 1 [USP'U]/G; MG/G
OINTMENT TOPICAL
Qty: 30 G | Refills: 1 | Status: SHIPPED | OUTPATIENT
Start: 2021-12-02 | End: 2021-12-30

## 2021-12-02 ASSESSMENT — FIBROSIS 4 INDEX: FIB4 SCORE: 0.88

## 2021-12-02 NOTE — ASSESSMENT & PLAN NOTE
"Reports 4-5 days of increasing vaginal and vulvar irritation, itching and burning.  Reports a \"new\" discharge, denies odor.  One sexual partner.  Will test affirm and gc/chlamydia.  Inner labia is very red, irritated, no lesions.    "

## 2021-12-02 NOTE — PROGRESS NOTES
"Chief Complaint   Patient presents with   • Vaginal Itching     burning, discharge       Subjective:   Marisabel Simons is a 41 y.o. female here today for evaluation and management of:    Vaginal itching  Reports 4-5 days of increasing vaginal and vulvar irritation, itching and burning.  Reports a \"new\" discharge, denies odor.  One sexual partner.  Will test affirm and gc/chlamydia.  Inner labia is very red, irritated, no lesions.           Current medicines (including changes today)  Current Outpatient Medications   Medication Sig Dispense Refill   • nystatin-triamcinalone (MYCOLOG) 626265-4.1 UNIT/GM-% Ointment Apply to vulva bid as needed for discomfort 30 g 1   • atorvastatin (LIPITOR) 20 MG Tab Take 1 Tablet by mouth every day. 90 Tablet 1   • phentermine (ADIPEX-P) 37.5 MG tablet Take 1 Tablet by mouth every morning before breakfast for 90 days. 90 Tablet 0   • albuterol 108 (90 Base) MCG/ACT Aero Soln inhalation aerosol Inhale 2 Puffs every four hours as needed for Shortness of Breath. 1 Each 3   • metFORMIN ER (GLUCOPHAGE XR) 500 MG TABLET SR 24 HR Take 2 Tablets by mouth every day. 180 tablet 1   • lisinopril (PRINIVIL) 5 MG Tab Take 1 tablet by mouth every day. 90 tablet 3   • Lancets Lancets order: Lancets for f Insurance Preference. meter. Sig: use BID and prn ssx high or low sugar. #100 RF x 3 200 Each 0   • VITAMIN D-VITAMIN K PO Take 5,000 Units by mouth.     • Probiotic Product (PROBIOTIC DAILY PO) Take  by mouth.     • Dapagliflozin Propanediol (FARXIGA) 10 MG Tab Take 1 tablet by mouth every day. (Patient not taking: Reported on 11/8/2021) 90 tablet 1   • Dulaglutide (TRULICITY) 1.5 MG/0.5ML Solution Pen-injector Inject 1 PEN under the skin every 7 days. (Patient not taking: Reported on 11/8/2021) 6.72 mL 1   • Blood Glucose Monitoring Suppl Device Meter: Dispense Device of Insurance Preference. Sig. Use as directed for blood sugar monitoring. #1. NR. 1 Each 0     No current " "facility-administered medications for this visit.     She  has a past medical history of Asthma and DM (diabetes mellitus) ([facility]) (02/03/2020). She also has no past medical history of Hypertension.         Objective:     /78 (BP Location: Left arm, Patient Position: Sitting, BP Cuff Size: Large adult)   Pulse (!) 102   Temp 36.1 °C (96.9 °F) (Temporal)   Ht 1.638 m (5' 4.5\")   Wt 122 kg (270 lb)   SpO2 95%  Body mass index is 45.63 kg/m².   Physical Exam:  Constitutional: Alert, no distress.  Skin: Warm, dry, good turgor,no cyanosis, no rashes in visible areas.  Eye: Equal, round and reactive, conjunctiva clear, lids normal.  GYN:  Labia red and irritated, vaginal walls with gray/white adherent discharge. .no odor  Psych: Alert and oriented x3, normal affect and mood and judgement.        Assessment and Plan:   The following treatment plan was discussed    1. Vaginal itching  Acute issue.  Swab for pathogens. This could represent yeast, BV or other infection.  Also could be vaginal irritation from recent hot tub.  mycolog ointment to pharmacy bid.  Await test results and treat.    - VAGINAL PATHOGENS DNA PANEL; Future    2. Vaginal discharge  See #1  - Chlamydia/GC PCR Urine Or Swab; Future      Followup: No follow-ups on file.         Educated in proper administration of medication(s) ordered today including safety, possible SE, risks, benefits, rationale and alternatives to therapy.     Please note that this dictation was created using voice recognition software. I have made every reasonable attempt to correct obvious errors, but I expect that there are errors of grammar and possibly content that I did not discover before finalizing the note.  "

## 2021-12-23 DIAGNOSIS — E66.01 MORBID OBESITY (HCC): ICD-10-CM

## 2021-12-23 RX ORDER — PHENTERMINE HYDROCHLORIDE 37.5 MG/1
37.5 TABLET ORAL
Qty: 90 TABLET | Refills: 0 | OUTPATIENT
Start: 2021-12-23 | End: 2022-03-23

## 2021-12-23 NOTE — TELEPHONE ENCOUNTER
Received request via: Pharmacy    Was the patient seen in the last year in this department? Yes    Does the patient have an active prescription (recently filled or refills available) for medication(s) requested? No     Patient is scheduled to establish on 12/30/21

## 2021-12-28 ENCOUNTER — NON-PROVIDER VISIT (OUTPATIENT)
Dept: MEDICAL GROUP | Facility: PHYSICIAN GROUP | Age: 41
End: 2021-12-28
Payer: COMMERCIAL

## 2021-12-28 DIAGNOSIS — E11.9 TYPE 2 DIABETES MELLITUS WITHOUT COMPLICATION, WITHOUT LONG-TERM CURRENT USE OF INSULIN (HCC): ICD-10-CM

## 2021-12-28 PROCEDURE — 99401 PREV MED CNSL INDIV APPRX 15: CPT | Performed by: STUDENT IN AN ORGANIZED HEALTH CARE EDUCATION/TRAINING PROGRAM

## 2021-12-28 RX ORDER — GLIPIZIDE 5 MG/1
5 TABLET ORAL 2 TIMES DAILY
Qty: 180 TABLET | Refills: 1 | Status: SHIPPED | OUTPATIENT
Start: 2021-12-28 | End: 2022-08-24

## 2021-12-28 RX ORDER — PIOGLITAZONEHYDROCHLORIDE 30 MG/1
30 TABLET ORAL DAILY
Qty: 90 TABLET | Refills: 1 | Status: SHIPPED | OUTPATIENT
Start: 2021-12-28 | End: 2022-06-14

## 2021-12-28 RX ORDER — METFORMIN HYDROCHLORIDE 500 MG/1
1000 TABLET, EXTENDED RELEASE ORAL 2 TIMES DAILY
Qty: 360 TABLET | Refills: 1 | Status: SHIPPED | OUTPATIENT
Start: 2021-12-28 | End: 2022-08-24

## 2021-12-29 NOTE — PROGRESS NOTES
Patient Consult Note - Follow Up Visit  Primary care physician: DIPIKA Norman    Reason for consult: Management of Uncontrolled Type 2 Diabetes    Time IN:  4:30pm  Time OUT:  4:47pm    HPI:  Marisabel Simons is a 41 y.o. old patient who comes in today for evaluation of above stated problem.    Most Recent HbA1c:   Lab Results   Component Value Date/Time    HBA1C 9.2 (H) 11/05/2021 06:47 AM        Current Diabetes Regimen:    Metformin ER 1000mg QD    Before Breakfast: not testing, could not afford test strip copay, encouraged her to find store brand machine/strips at [employer] or [employer] that my be less expensive.  Before Lunch:  Before Dinner:  Before Bedtime:  Other times:  Hypoglycemia:  None    Foot Exam:  Monofilament exam - up to date, not conducted today.    Preventative Management  BP regimen (ACE/ARB) - Lisinopril 5mg QD  ASA - none  Statin - Atorvastatin 20mg QD  Last Retinal Scan - unknown  Last Foot Exam - 3/2021  Last A1c -   Lab Results   Component Value Date/Time    HBA1C 9.2 (H) 11/05/2021 06:47 AM      Last Microalbuminuria - 11/2021    updated caregaps    ROS:  Constitutional: No weight loss  Cardiac: No palpitations or racing heart  Resp: No shortness of breath  Neuro: No numbness or tinging in feet  Endo: No heat or cold intolerance, no polyuria or polydipsia  All other systems were reviewed and were negative.    Past Medical History:  Patient Active Problem List    Diagnosis Date Noted   • Well woman exam with routine gynecological exam 02/04/2021   • Vaginal itching 02/04/2021   • Elevated cholesterol/high density lipoprotein ratio 09/15/2020   • Closed fracture of left wrist 09/01/2020   • Type 2 diabetes mellitus without complication, without long-term current use of insulin ([facility]) 09/01/2020   • Mild intermittent asthma without complication 09/01/2020   • Morbid obesity ([facility]) 09/01/2020       Past Surgical History:  Past Surgical History:   Procedure Laterality Date   • PB OPEN TX  RADIAL & ULNAR SHAFT FX FIX RADIUS A*  2/4/2020    Procedure: ORIF, FRACTURE, RADIUS AND ULNA;  Surgeon: Ambrosio Lara M.D.;  Location: SURGERY [facility];  Service: General   • ORIF, WRIST Left 2/4/2020    Procedure: ORIF, WRIST- DISTAL RADIUS;  Surgeon: Ambrosio Lara M.D.;  Location: SURGERY [facility];  Service: General   • GYN SURGERY  2007/2010    c/s x2   • TUBAL COAGULATION LAPAROSCOPIC BILATERAL         Allergies:  Patient has no known allergies.    Social History:  Social History     Socioeconomic History   • Marital status:      Spouse name: Not on file   • Number of children: Not on file   • Years of education: Not on file   • Highest education level: Not on file   Occupational History   • Not on file   Tobacco Use   • Smoking status: Never Smoker   • Smokeless tobacco: Never Used   Vaping Use   • Vaping Use: Never used   Substance and Sexual Activity   • Alcohol use: Yes     Comment: 3 per week   • Drug use: Yes     Frequency: 1.0 times per week     Types: Marijuana     Comment: edible   • Sexual activity: Yes     Partners: Male     Birth control/protection: Female Sterilization   Other Topics Concern   • Not on file   Social History Narrative   • Not on file     Social Determinants of Health     Financial Resource Strain:    • Difficulty of Paying Living Expenses: Not on file   Food Insecurity:    • Worried About Running Out of Food in the Last Year: Not on file   • Ran Out of Food in the Last Year: Not on file   Transportation Needs:    • Lack of Transportation (Medical): Not on file   • Lack of Transportation (Non-Medical): Not on file   Physical Activity:    • Days of Exercise per Week: Not on file   • Minutes of Exercise per Session: Not on file   Stress:    • Feeling of Stress : Not on file   Social Connections:    • Frequency of Communication with Friends and Family: Not on file   • Frequency of Social Gatherings with Friends and Family: Not on file   • Attends Sikh  Services: Not on file   • Active Member of Clubs or Organizations: Not on file   • Attends Club or Organization Meetings: Not on file   • Marital Status: Not on file   Intimate Partner Violence:    • Fear of Current or Ex-Partner: Not on file   • Emotionally Abused: Not on file   • Physically Abused: Not on file   • Sexually Abused: Not on file   Housing Stability:    • Unable to Pay for Housing in the Last Year: Not on file   • Number of Places Lived in the Last Year: Not on file   • Unstable Housing in the Last Year: Not on file       Family History:  Family History   Problem Relation Age of Onset   • Diabetes Mother    • Diabetes Father         renal failure   • Heart Disease Father         CHF   • Cancer Father        Medications:    Current Outpatient Medications:   •  nystatin-triamcinalone (MYCOLOG) 645518-4.1 UNIT/GM-% Ointment, Apply to vulva bid as needed for discomfort, Disp: 30 g, Rfl: 1  •  atorvastatin (LIPITOR) 20 MG Tab, Take 1 Tablet by mouth every day., Disp: 90 Tablet, Rfl: 1  •  albuterol 108 (90 Base) MCG/ACT Aero Soln inhalation aerosol, Inhale 2 Puffs every four hours as needed for Shortness of Breath., Disp: 1 Each, Rfl: 3  •  metFORMIN ER (GLUCOPHAGE XR) 500 MG TABLET SR 24 HR, Take 2 Tablets by mouth every day., Disp: 180 tablet, Rfl: 1  •  lisinopril (PRINIVIL) 5 MG Tab, Take 1 tablet by mouth every day., Disp: 90 tablet, Rfl: 3  •  Dapagliflozin Propanediol (FARXIGA) 10 MG Tab, Take 1 tablet by mouth every day. (Patient not taking: Reported on 11/8/2021), Disp: 90 tablet, Rfl: 1  •  Dulaglutide (TRULICITY) 1.5 MG/0.5ML Solution Pen-injector, Inject 1 PEN under the skin every 7 days. (Patient not taking: Reported on 11/8/2021), Disp: 6.72 mL, Rfl: 1  •  Lancets, Lancets order: Lancets for f Insurance Preference. meter. Sig: use BID and prn ssx high or low sugar. #100 RF x 3, Disp: 200 Each, Rfl: 0  •  Blood Glucose Monitoring Suppl Device, Meter: Dispense Device of Insurance Preference.  Sig. Use as directed for blood sugar monitoring. #1. NR., Disp: 1 Each, Rfl: 0  •  VITAMIN D-VITAMIN K PO, Take 5,000 Units by mouth., Disp: , Rfl:   •  Probiotic Product (PROBIOTIC DAILY PO), Take  by mouth., Disp: , Rfl:     Labs: Reviewed    Physical Examination:  Vital signs: There were no vitals taken for this visit. There is no height or weight on file to calculate BMI.  General: No apparent distress, cooperative  Eyes: No scleral icterus or discharge  ENMT: Normal on external inspection of nose, lips, normal thyroid exam  Neck: No abnormal masses on inspection  Resp: Normal effort, clear to auscultation bilaterally   CVS: Regular rate and rhythm, S1 S2 normal, no murmur   Extremities: No edema  Abdomen: abdominal obesity present  Neuro: Alert and oriented  Skin: No rash  Psych: Normal mood and affect, intact memory and able to make informed decisions    Assessment and Plan:    Patient tolerating current dose of metformin.  She has been in the midst of divorce and previous diabetic medications have become cost prohibitive.  She admits to poor dietary habits given stress of divorce and lack of consistent exercise.  Insurance may be changing in March, which could provide opportunity to get back to SGLT2i and GLP1.  Discussed option of PAP, may be difficult given proof of income would be with former spouse when in actuality it is just her.  Will reach out to [facility] to determine course of action.  Given increase in A1c, intervention is warranted.    Start Glipizide 5mg QD.  Start Pioglitazone 30mg QD.  Further optimize metformin ER to 1000mg BID.  Continue all other medications for now.  Stressed importance of shifting focus to proper eating habits in the new year, she is in agreement.  Increase exercise/activity to tolerability at this time.    Follow Up:  About four weeks    Thank you for allowing me to participate in the care of this patient.    Jake Hazel, PharmD, BCACP  12/28/21    CC:   Belén  Gabriella, NANCY

## 2021-12-30 ENCOUNTER — OFFICE VISIT (OUTPATIENT)
Dept: MEDICAL GROUP | Facility: PHYSICIAN GROUP | Age: 41
End: 2021-12-30
Payer: COMMERCIAL

## 2021-12-30 ENCOUNTER — HOSPITAL ENCOUNTER (OUTPATIENT)
Facility: MEDICAL CENTER | Age: 41
End: 2021-12-30
Attending: NURSE PRACTITIONER
Payer: COMMERCIAL

## 2021-12-30 VITALS
BODY MASS INDEX: 44.98 KG/M2 | SYSTOLIC BLOOD PRESSURE: 136 MMHG | OXYGEN SATURATION: 93 % | HEART RATE: 95 BPM | DIASTOLIC BLOOD PRESSURE: 80 MMHG | HEIGHT: 65 IN | TEMPERATURE: 97.4 F | WEIGHT: 270 LBS

## 2021-12-30 DIAGNOSIS — Z79.899 LONG-TERM CURRENT USE OF STIMULANT: ICD-10-CM

## 2021-12-30 DIAGNOSIS — J45.20 MILD INTERMITTENT ASTHMA WITHOUT COMPLICATION: ICD-10-CM

## 2021-12-30 DIAGNOSIS — N89.8 VAGINAL ITCHING: ICD-10-CM

## 2021-12-30 DIAGNOSIS — E78.6 ELEVATED CHOLESTEROL/HIGH DENSITY LIPOPROTEIN RATIO: ICD-10-CM

## 2021-12-30 DIAGNOSIS — R87.618 ABNORMAL PAPANICOLAOU SMEAR OF CERVIX WITH POSITIVE HUMAN PAPILLOMA VIRUS (HPV) TEST: ICD-10-CM

## 2021-12-30 DIAGNOSIS — E11.9 TYPE 2 DIABETES MELLITUS WITHOUT COMPLICATION, WITHOUT LONG-TERM CURRENT USE OF INSULIN (HCC): ICD-10-CM

## 2021-12-30 DIAGNOSIS — I10 PRIMARY HYPERTENSION: ICD-10-CM

## 2021-12-30 DIAGNOSIS — E55.9 VITAMIN D DEFICIENCY: ICD-10-CM

## 2021-12-30 DIAGNOSIS — B00.9 HERPES INFECTION: ICD-10-CM

## 2021-12-30 DIAGNOSIS — E66.01 MORBID OBESITY (HCC): ICD-10-CM

## 2021-12-30 PROCEDURE — 99214 OFFICE O/P EST MOD 30 MIN: CPT | Performed by: NURSE PRACTITIONER

## 2021-12-30 PROCEDURE — 87529 HSV DNA AMP PROBE: CPT

## 2021-12-30 RX ORDER — MONTELUKAST SODIUM 10 MG/1
10 TABLET ORAL DAILY
Qty: 90 TABLET | Refills: 0 | Status: SHIPPED | OUTPATIENT
Start: 2021-12-30 | End: 2022-04-08 | Stop reason: SDUPTHER

## 2021-12-30 RX ORDER — PHENTERMINE HYDROCHLORIDE 37.5 MG/1
37.5 CAPSULE ORAL EVERY MORNING
Qty: 90 CAPSULE | Refills: 0 | Status: SHIPPED | OUTPATIENT
Start: 2021-12-30 | End: 2022-03-30

## 2021-12-30 RX ORDER — VALACYCLOVIR HYDROCHLORIDE 1 G/1
1000 TABLET, FILM COATED ORAL 2 TIMES DAILY
Qty: 20 TABLET | Refills: 0 | Status: SHIPPED | OUTPATIENT
Start: 2021-12-30 | End: 2022-04-08

## 2021-12-30 ASSESSMENT — FIBROSIS 4 INDEX: FIB4 SCORE: 0.88

## 2021-12-30 NOTE — PROGRESS NOTES
Subjective:     CC:    Chief Complaint   Patient presents with   • Establish Care     New patient    • Medication Refill     phentermine, Actos   • Vaginal Itching     requesting medication for flare up        HISTORY OF THE PRESENT ILLNESS: Patient is a 41 y.o. female, here today to establish care. Prior PCP was Molly Vieyra. The below problems were discussed/reviewed at this visit:    Problem   Primary Hypertension    Taking lisinopril 5mg QD; no med side effects; Denies CP, palpitations, dizziness     Abnormal Papanicolaou Smear of Cervix With Positive Human Papilloma Virus (Hpv) Test    2/2021 ASCUS on PAP with +HPV; colpo with Dr De Jesus in 3/2021, no cancer; needs to establish with new GYN for 6 month followup (Dr De Jesus office closed per pt)     Vitamin D Deficiency    Taking daily MVI       Ref. Range 9/1/2020 11:38 11/5/2021 06:47   25-Hydroxy   Vitamin D 25 Latest Ref Range: 30 - 100 ng/mL 20 (L) 24 (L)        Vaginal Itching    Vaginal itching/burning for several weeks unresolved, nyastatin-triam made worse; STD testing was negative      Ref. Range 12/2/2021 07:42 12/2/2021 07:58   C. trachomatis by PCR Latest Ref Range: Negative   Negative   Candida species DNA Probe Latest Ref Range: Negative  Negative    Gardnerella vaginalis DNA Probe Latest Ref Range: Negative  Negative    N. gonorrhoeae by PCR Latest Ref Range: Negative   Negative   Source Unknown  Genital   Trichamonas vaginalis DNA Probe Latest Ref Range: Negative  Negative         Elevated Cholesterol/High Density Lipoprotein Ratio    Taking atorvastatin 20mg QHS; no myalgia         Type 2 Diabetes Mellitus Without Complication, Without Long-Term Current Use of Insulin ([facility])    Last visit with karma/pharm 12/28, next 2/1/2022;   Increased  metformin ER to 1000mg BID, started her on Glipizide 5mg QD, Pioglitazone 30mg QD (waiting for meds to come in the mail)    Was on  Farxiga & trulicy but stopped 9/2021 due to cost; plans to switch back in  "03/2022 when her new insurance plan kicks in; states had good weight loss results with it.     Ref. Range 11/5/2021 06:47   Glycohemoglobin Latest Ref Range: 4.0 - 5.6 % 9.2 (H)   Estim. Avg Glu Latest Units: mg/dL 217   Cholesterol,Tot Latest Ref Range: 100 - 199 mg/dL 237 (H)   Triglycerides Latest Ref Range: 0 - 149 mg/dL 118   HDL Latest Ref Range: >=40 mg/dL 87   LDL Latest Ref Range: <100 mg/dL 126 (H)   Micro Alb Creat Ratio Latest Ref Range: 0 - 30 mg/g 72 (H)   Creatinine, Urine Latest Units: mg/dL 147.21   Microalbumin, Urine Random Latest Units: mg/dL 10.6   Urobilinogen, Urine Latest Ref Range: Negative  0.2        Mild Intermittent Asthma Without Complication    COVID +ve 3/2021; cough lingering; using albuterol prn; No fever, CP, SOB     Morbid Obesity (Hcc)    Was not able to follow through with bariatric consult due to insurance change; she will let me know when new insurance is ready 3/2022 for new referral.  Asking for refill Phentermine; has used 37.5mg QD off/on for about 6months; lost 6 lbs, but regained 10 lbs when insurance lapsed & she stopped faxiga/trulicy;        Past Surgical History:   Procedure Laterality Date   • PB OPEN TX RADIAL & ULNAR SHAFT FX FIX RADIUS A*  2/4/2020    Procedure: ORIF, FRACTURE, RADIUS AND ULNA;  Surgeon: Ambrosio Lara M.D.;  Location: SURGERY [facility];  Service: General   • ORIF, WRIST Left 2/4/2020    Procedure: ORIF, WRIST- DISTAL RADIUS;  Surgeon: Ambrosio Lara M.D.;  Location: SURGERY [facility];  Service: General   • GYN SURGERY  2007/2010    c/s x2   • TUBAL COAGULATION LAPAROSCOPIC BILATERAL       Health Maintenance: deferred to next visit     ROS: per HPI      Objective:     Exam: /80 (BP Location: Left arm, Patient Position: Sitting, BP Cuff Size: Large adult)   Pulse 95   Temp 36.3 °C (97.4 °F) (Temporal)   Ht 1.638 m (5' 4.5\")   Wt 122 kg (270 lb)   SpO2 93%  Body mass index is 45.63 kg/m².    Physical Exam  Constitutional:  "      Appearance: Normal appearance.   Cardiovascular:      Pulses: Normal pulses.      Heart sounds: Normal heart sounds.   Pulmonary:      Effort: Pulmonary effort is normal.      Breath sounds: Normal breath sounds.   Genitourinary:     Labia:         Right: Rash present.         Left: Rash present.       Comments: Bilateral labia excoriated with weeping areas  Musculoskeletal:         General: Normal range of motion.      Cervical back: Normal range of motion and neck supple.   Neurological:      General: No focal deficit present.      Mental Status: She is alert and oriented to person, place, and time.   Psychiatric:         Mood and Affect: Mood normal.         Behavior: Behavior normal.         Thought Content: Thought content normal.         Judgment: Judgment normal.       Assessment & Plan:   41 y.o. female with the following -    Problem List Items Addressed This Visit     Type 2 diabetes mellitus without complication, without long-term current use of insulin ([facility])     - continue metformin ER 1000mg BID, Glipizide 5mg QD, Pioglitazone 30mg QD  - check A1C, lipid at next visit if not already done by pharm         Relevant Orders    Referral for Retinal Screening Exam    HEMOGLOBIN A1C    Lipid Profile    Mild intermittent asthma without complication     - add singulair 10mg QD; will reassess cough at next visit  & d/c if no longer needed; continue prn albuterol         Relevant Medications    montelukast (SINGULAIR) 10 MG Tab    Morbid obesity (HCC)     - refilled phentermine 37.mg QD; counseled on dietary intake, getting back to exercising.   - PDMP reviewed; CS agreement signed today; UDS at next visit if still on phentermine         Relevant Medications    phentermine 37.5 MG capsule    Other Relevant Orders    Lipid Profile    Elevated cholesterol/high density lipoprotein ratio     - continue atorvastatin 20mg QHS  - check lipid profile at next visit         Vaginal itching     - labia excoriated with  weeping areas; swabbed for HSV; also ordered blood test for HSV 1/2  - will start her on valcyclovir 1gm BID [id number]; sitz baths to calm skin irritation; she will message me in a couple of days & if no improvement, we will retry nyastatin without steroid         Primary hypertension     - continue lisinopril 5mg QD  - annual CMP/microalbum (due 11/2022)         Abnormal Papanicolaou smear of cervix with positive human papilloma virus (HPV) test     - GYN referral submitted today         Relevant Orders    Referral to Gynecology    Vitamin D deficiency     - continue MVI  - check vit D at next visit           Other Visit Diagnoses     Long-term current use of stimulant        Relevant Orders    Controlled Substance Treatment Agreement    Herpes infection        Relevant Medications    valacyclovir (VALTREX) 1 GM Tab    Other Relevant Orders    HSV 1/2 IGG W/ TYPE SPECIFIC RFLX    HSV 1/2 By PCR(Herpes)+[id number]        Return in about 3 months (around 4/5/2022) for Annual Visit  with labs, DM2 management.    Please note that this dictation was created using voice recognition software. I have made every reasonable attempt to correct obvious errors, but I expect that there are errors of grammar and possibly content that I did not discover before finalizing the note.

## 2021-12-31 DIAGNOSIS — B00.9 HERPES INFECTION: ICD-10-CM

## 2021-12-31 PROBLEM — R87.618 ABNORMAL PAPANICOLAOU SMEAR OF CERVIX WITH POSITIVE HUMAN PAPILLOMA VIRUS (HPV) TEST: Status: ACTIVE | Noted: 2021-12-31

## 2021-12-31 PROBLEM — I10 PRIMARY HYPERTENSION: Status: ACTIVE | Noted: 2021-12-31

## 2021-12-31 PROBLEM — E55.9 VITAMIN D DEFICIENCY: Status: ACTIVE | Noted: 2021-12-31

## 2021-12-31 NOTE — ASSESSMENT & PLAN NOTE
- continue metformin ER 1000mg BID, Glipizide 5mg QD, Pioglitazone 30mg QD  - check A1C, lipid at next visit if not already done by pharm

## 2021-12-31 NOTE — ASSESSMENT & PLAN NOTE
- add singulair 10mg QD; will reassess cough at next visit  & d/c if no longer needed; continue prn albuterol

## 2021-12-31 NOTE — ASSESSMENT & PLAN NOTE
- refilled phentermine 37.mg QD; counseled on dietary intake, getting back to exercising.   - PDMP reviewed; CS agreement signed today; UDS at next visit if still on phentermine

## 2021-12-31 NOTE — ASSESSMENT & PLAN NOTE
- labia excoriated with weeping areas; swabbed for HSV; also ordered blood test for HSV 1/2  - will start her on valcyclovir 1gm BID [id number]; sitz baths to calm skin irritation; she will message me in a couple of days & if no improvement, we will retry nyastatin without steroid

## 2022-01-04 ENCOUNTER — APPOINTMENT (OUTPATIENT)
Dept: RADIOLOGY | Facility: IMAGING CENTER | Age: 42
End: 2022-01-04
Attending: NURSE PRACTITIONER
Payer: COMMERCIAL

## 2022-01-04 ENCOUNTER — OFFICE VISIT (OUTPATIENT)
Dept: URGENT CARE | Facility: CLINIC | Age: 42
End: 2022-01-04
Payer: COMMERCIAL

## 2022-01-04 VITALS
DIASTOLIC BLOOD PRESSURE: 78 MMHG | RESPIRATION RATE: 16 BRPM | OXYGEN SATURATION: 94 % | SYSTOLIC BLOOD PRESSURE: 118 MMHG | TEMPERATURE: 98 F | HEART RATE: 66 BPM

## 2022-01-04 DIAGNOSIS — R06.02 SHORTNESS OF BREATH: ICD-10-CM

## 2022-01-04 DIAGNOSIS — U07.1 COVID: ICD-10-CM

## 2022-01-04 LAB
EXTERNAL QUALITY CONTROL: NORMAL
SARS-COV+SARS-COV-2 AG RESP QL IA.RAPID: POSITIVE

## 2022-01-04 PROCEDURE — 87426 SARSCOV CORONAVIRUS AG IA: CPT | Mod: QW | Performed by: NURSE PRACTITIONER

## 2022-01-04 PROCEDURE — 99214 OFFICE O/P EST MOD 30 MIN: CPT | Mod: CS | Performed by: NURSE PRACTITIONER

## 2022-01-04 PROCEDURE — 71046 X-RAY EXAM CHEST 2 VIEWS: CPT | Mod: TC | Performed by: NURSE PRACTITIONER

## 2022-01-05 LAB
HSV DNA SPEC QL NAA+PROBE: NOT DETECTED
SPECIMEN SOURCE: NORMAL

## 2022-01-08 ASSESSMENT — ENCOUNTER SYMPTOMS
NAUSEA: 0
COUGH: 1
SHORTNESS OF BREATH: 1
NECK PAIN: 0
EYE PAIN: 0
FEVER: 0
SORE THROAT: 0
CHILLS: 0
DIZZINESS: 0
HEADACHES: 1
VOMITING: 0
WHEEZING: 0
MYALGIAS: 0
RHINORRHEA: 1

## 2022-01-08 NOTE — PROGRESS NOTES
Subjective:   Marisabel Simons is a 41 y.o. female who presents for Shortness of Breath      URI   This is a new problem. Episode onset: 2 days. The problem has been unchanged. The maximum temperature recorded prior to her arrival was 100.4 - 100.9 F. The fever has been present for less than 1 day. Associated symptoms include congestion, coughing, headaches and rhinorrhea. Pertinent negatives include no chest pain, nausea, neck pain, plugged ear sensation, rash, sore throat, vomiting or wheezing. Associated symptoms comments: Sob  . She has tried acetaminophen (oxygen at home ) for the symptoms. The treatment provided mild relief.       Review of Systems   Constitutional: Positive for malaise/fatigue. Negative for chills and fever.   HENT: Positive for congestion and rhinorrhea. Negative for sore throat.    Eyes: Negative for pain.   Respiratory: Positive for cough and shortness of breath. Negative for wheezing.    Cardiovascular: Negative for chest pain.   Gastrointestinal: Negative for nausea and vomiting.   Genitourinary: Negative for hematuria.   Musculoskeletal: Negative for myalgias and neck pain.   Skin: Negative for rash.   Neurological: Positive for headaches. Negative for dizziness.       Medications:    • albuterol Aers  • atorvastatin Tabs  • Blood Glucose Monitoring Suppl Aleksandra  • glipiZIDE Tabs  • Lancets  • lisinopril Tabs  • metFORMIN ER Tb24  • montelukast Tabs  • MULTIVITAMIN ADULT PO  • phentermine  • pioglitazone Tabs  • PROBIOTIC DAILY PO  • valacyclovir Tabs  • VITAMIN D-VITAMIN K PO    Allergies: Patient has no known allergies.    Problem List: Marisabel Simons does not have any pertinent problems on file.    Surgical History:  Past Surgical History:   Procedure Laterality Date   • PB OPEN TX RADIAL & ULNAR SHAFT FX FIX RADIUS A*  2/4/2020    Procedure: ORIF, FRACTURE, RADIUS AND ULNA;  Surgeon: Ambrosio Lara M.D.;  Location: SURGERY [facility];  Service: General   • ORIF,  WRIST Left 2/4/2020    Procedure: ORIF, WRIST- DISTAL RADIUS;  Surgeon: Ambrosio Lara M.D.;  Location: SURGERY [facility];  Service: General   • GYN SURGERY  2007/2010    c/s x2   • TUBAL COAGULATION LAPAROSCOPIC BILATERAL         Past Social Hx: Marisabel Simons  reports that she has never smoked. She has never used smokeless tobacco. She reports current alcohol use. She reports current drug use. Frequency: 1.00 time per week. Drug: Marijuana.     Past Family Hx:  Marisabel Simons family history includes Cancer in her father; Diabetes in her father and mother; Heart Disease in her father.     Problem list, medications, and allergies reviewed by myself today in Epic.     Objective:     /78   Pulse 66   Temp 36.7 °C (98 °F)   Resp 16   SpO2 94%     Physical Exam  Vitals and nursing note reviewed.   Constitutional:       General: She is not in acute distress.     Appearance: She is well-developed.   HENT:      Head: Normocephalic and atraumatic.      Right Ear: Tympanic membrane and external ear normal.      Left Ear: Tympanic membrane and external ear normal.      Nose: Nose normal.      Right Sinus: No maxillary sinus tenderness or frontal sinus tenderness.      Left Sinus: No maxillary sinus tenderness or frontal sinus tenderness.      Mouth/Throat:      Mouth: Mucous membranes are moist.      Pharynx: Uvula midline. No posterior oropharyngeal erythema.      Tonsils: No tonsillar exudate or tonsillar abscesses.   Eyes:      General:         Right eye: No discharge.         Left eye: No discharge.      Conjunctiva/sclera: Conjunctivae normal.   Cardiovascular:      Rate and Rhythm: Normal rate.   Pulmonary:      Effort: Pulmonary effort is normal. No respiratory distress.      Breath sounds: Normal breath sounds.   Abdominal:      General: There is no distension.   Musculoskeletal:         General: Normal range of motion.   Skin:     General: Skin is warm and dry.   Neurological:       General: No focal deficit present.      Mental Status: She is alert and oriented to person, place, and time. Mental status is at baseline.      Gait: Gait (gait at baseline) normal.   Psychiatric:         Judgment: Judgment normal.         Assessment/Plan:     Diagnosis and associated orders:     1. Shortness of breath  DX-CHEST-2 VIEWS    POCT SARS-COV Antigen SANDRA (Symptomatic Only)   2. COVID        Comments/MDM:     I independently reviewed the patient's imaging and agree with the interpretation of the radiologist.    •   No acute cardiopulmonary abnormality.    Covid positive,    Patient is a 40-year-old female present with stated above, patient with acute illness with systemic symptoms, is short of breath, oxygen adequate  Symptomatic and supportive care:   Plenty of oral hydration and rest   Over the counter cough suppressant as directed.  Tylenol or ibuprofen for pain and fever as directed.   Warm salt water gargles for sore throat, soft foods, cool liquids.   Saline nasal spray and Flonase as a decongestant.   Infection control measures at home. Stay away from people, Hand washing, covering sneeze/cough, disinfect surfaces.   Remain home from work, school, and other populated environments. Work note provided with information of quarantine measures per CDC guidelines.   Overall, the patient is well-appearing. They are not hypoxic, afebrile, and a normal pulmonary exam.             My total time spent caring for the patient on the day of the encounter was 30 minutes.   This does not include time spent on separately billable procedures/tests.      Please note that this dictation was created using voice recognition software. I have made a reasonable attempt to correct obvious errors, but I expect that there are errors of grammar and possibly content that I did not discover before finalizing the note.    This note was electronically signed by Nico ELDRIDGE.

## 2022-02-09 ENCOUNTER — DOCUMENTATION (OUTPATIENT)
Dept: VASCULAR LAB | Facility: MEDICAL CENTER | Age: 42
End: 2022-02-09

## 2022-02-09 NOTE — PROGRESS NOTES
LM with patient to call back and reschedule recently no-showed appt for pharmacotherapy follow up.  Jake Hazel, PharmD, BCACP

## 2022-03-09 ENCOUNTER — DOCUMENTATION (OUTPATIENT)
Dept: VASCULAR LAB | Facility: MEDICAL CENTER | Age: 42
End: 2022-03-09
Payer: COMMERCIAL

## 2022-03-09 NOTE — PROGRESS NOTES
LM with patient to call back and reschedule recently no-showed appt for pharmacotherapy follow up.  2nd call.  Jake Hazel, MeraD, BCACP

## 2022-03-23 ENCOUNTER — DOCUMENTATION (OUTPATIENT)
Dept: VASCULAR LAB | Facility: MEDICAL CENTER | Age: 42
End: 2022-03-23
Payer: COMMERCIAL

## 2022-03-23 NOTE — PROGRESS NOTES
LM with patient to call back and reschedule recently no-showed appt for pharmacotherapy follow up.  3rd call.  Accurate Groupt message sent as well.    Jake Hazel, PharmD, BCACP

## 2022-04-06 ENCOUNTER — HOSPITAL ENCOUNTER (OUTPATIENT)
Dept: LAB | Facility: MEDICAL CENTER | Age: 42
End: 2022-04-06
Attending: NURSE PRACTITIONER
Payer: COMMERCIAL

## 2022-04-06 DIAGNOSIS — E11.9 TYPE 2 DIABETES MELLITUS WITHOUT COMPLICATION, WITHOUT LONG-TERM CURRENT USE OF INSULIN (HCC): ICD-10-CM

## 2022-04-06 DIAGNOSIS — E66.01 MORBID OBESITY (HCC): ICD-10-CM

## 2022-04-06 LAB
CHOLEST SERPL-MCNC: 182 MG/DL (ref 100–199)
EST. AVERAGE GLUCOSE BLD GHB EST-MCNC: 235 MG/DL
FASTING STATUS PATIENT QL REPORTED: NORMAL
HBA1C MFR BLD: 9.8 % (ref 4–5.6)
HDLC SERPL-MCNC: 77 MG/DL
LDLC SERPL CALC-MCNC: 82 MG/DL
TRIGL SERPL-MCNC: 117 MG/DL (ref 0–149)

## 2022-04-06 PROCEDURE — 36415 COLL VENOUS BLD VENIPUNCTURE: CPT

## 2022-04-06 PROCEDURE — 83036 HEMOGLOBIN GLYCOSYLATED A1C: CPT

## 2022-04-06 PROCEDURE — 80061 LIPID PANEL: CPT

## 2022-04-08 ENCOUNTER — OFFICE VISIT (OUTPATIENT)
Dept: MEDICAL GROUP | Facility: PHYSICIAN GROUP | Age: 42
End: 2022-04-08
Payer: COMMERCIAL

## 2022-04-08 ENCOUNTER — HOSPITAL ENCOUNTER (OUTPATIENT)
Dept: LAB | Facility: MEDICAL CENTER | Age: 42
End: 2022-04-08
Attending: REGISTERED NURSE
Payer: COMMERCIAL

## 2022-04-08 VITALS
SYSTOLIC BLOOD PRESSURE: 144 MMHG | HEART RATE: 112 BPM | TEMPERATURE: 97.5 F | HEIGHT: 65 IN | BODY MASS INDEX: 45.65 KG/M2 | DIASTOLIC BLOOD PRESSURE: 86 MMHG | WEIGHT: 274 LBS | OXYGEN SATURATION: 97 %

## 2022-04-08 DIAGNOSIS — N89.8 VAGINAL ITCHING: ICD-10-CM

## 2022-04-08 DIAGNOSIS — E78.6 ELEVATED CHOLESTEROL/HIGH DENSITY LIPOPROTEIN RATIO: ICD-10-CM

## 2022-04-08 DIAGNOSIS — I10 PRIMARY HYPERTENSION: ICD-10-CM

## 2022-04-08 DIAGNOSIS — E66.01 MORBID OBESITY (HCC): ICD-10-CM

## 2022-04-08 DIAGNOSIS — J45.20 MILD INTERMITTENT ASTHMA WITHOUT COMPLICATION: ICD-10-CM

## 2022-04-08 DIAGNOSIS — E11.9 TYPE 2 DIABETES MELLITUS WITHOUT COMPLICATION, WITHOUT LONG-TERM CURRENT USE OF INSULIN (HCC): ICD-10-CM

## 2022-04-08 DIAGNOSIS — R87.618 ABNORMAL PAPANICOLAOU SMEAR OF CERVIX WITH POSITIVE HUMAN PAPILLOMA VIRUS (HPV) TEST: ICD-10-CM

## 2022-04-08 LAB
HIV 1+2 AB+HIV1 P24 AG SERPL QL IA: NORMAL
T PALLIDUM AB SER QL IA: NORMAL

## 2022-04-08 PROCEDURE — 86780 TREPONEMA PALLIDUM: CPT

## 2022-04-08 PROCEDURE — 87389 HIV-1 AG W/HIV-1&-2 AB AG IA: CPT

## 2022-04-08 PROCEDURE — 36415 COLL VENOUS BLD VENIPUNCTURE: CPT

## 2022-04-08 PROCEDURE — 99214 OFFICE O/P EST MOD 30 MIN: CPT | Performed by: NURSE PRACTITIONER

## 2022-04-08 RX ORDER — MONTELUKAST SODIUM 10 MG/1
10 TABLET ORAL DAILY
Qty: 90 TABLET | Refills: 3 | Status: SHIPPED | OUTPATIENT
Start: 2022-04-08 | End: 2023-03-31

## 2022-04-08 RX ORDER — ATORVASTATIN CALCIUM 20 MG/1
20 TABLET, FILM COATED ORAL DAILY
Qty: 90 TABLET | Refills: 3 | Status: SHIPPED | OUTPATIENT
Start: 2022-04-08 | End: 2023-03-31 | Stop reason: SDUPTHER

## 2022-04-08 RX ORDER — HYDROCHLOROTHIAZIDE 12.5 MG/1
12.5 TABLET ORAL DAILY
Qty: 90 TABLET | Refills: 3 | Status: SHIPPED | OUTPATIENT
Start: 2022-04-08 | End: 2023-03-31 | Stop reason: SDUPTHER

## 2022-04-08 RX ORDER — ALBUTEROL SULFATE 90 UG/1
2 AEROSOL, METERED RESPIRATORY (INHALATION) EVERY 4 HOURS PRN
Qty: 1 EACH | Refills: 3 | Status: SHIPPED | OUTPATIENT
Start: 2022-04-08 | End: 2023-03-31 | Stop reason: SDUPTHER

## 2022-04-08 RX ORDER — PHENTERMINE HYDROCHLORIDE 37.5 MG/1
37.5 CAPSULE ORAL EVERY MORNING
COMMUNITY
End: 2022-06-14 | Stop reason: SDUPTHER

## 2022-04-08 ASSESSMENT — ENCOUNTER SYMPTOMS
EYES NEGATIVE: 1
COUGH: 0
PALPITATIONS: 0
FEVER: 0
SHORTNESS OF BREATH: 0
CONSTITUTIONAL NEGATIVE: 1
SPUTUM PRODUCTION: 0

## 2022-04-08 ASSESSMENT — FIBROSIS 4 INDEX: FIB4 SCORE: 0.9

## 2022-04-08 ASSESSMENT — PATIENT HEALTH QUESTIONNAIRE - PHQ9: CLINICAL INTERPRETATION OF PHQ2 SCORE: 0

## 2022-04-08 NOTE — ASSESSMENT & PLAN NOTE
Has GYN appt today at [facility] to follow up on abnormal PAP with negative colpo; states vaginal lesions came back 3 weeks ago; not itchy or burning. Bothers her only when she has sex.

## 2022-04-08 NOTE — ASSESSMENT & PLAN NOTE
Reports cough improved and albuterol usage reduced to about once a week when sigulair was added  - continue Daily Singulair 10mg QD  - reassess symptoms at next visit

## 2022-04-08 NOTE — ASSESSMENT & PLAN NOTE
- rash improved with trial of valtrex in 1/2022; reports lesions are slowly coming back; not itchy or burning this time; only bothers her when she has sex; she has GYN appointment today and will consult with them on this as well

## 2022-04-08 NOTE — ASSESSMENT & PLAN NOTE
BP in clinic today 144/86; she does not have home BP device; most recently she is noticing swelling in ankles, non-pitting on exam today  - continue lisinopril 5mg QD; add HCTZ 12.5mg QD to help with edema & BP management  - annual CMP/microalbum (due 11/2022)

## 2022-04-08 NOTE — PATIENT INSTRUCTIONS
For GYN Follow up today, please mention that   - lesion swab in 12/2021 was negative for HSV; the lesions did seem to go away when you took valtrex; you state lesions came back 3 weeks ago, not itchy or burning but bothers you when you have sex  - 2/2021 ASCUS on PAP with +HPV; colpo with LEEP with Dr De Jesus in 3/2021, no cancer; needs to establish with new GYN for 6 month followup (Dr De Jesus office closed per pt)    For HTN, please get a blood pressure machine and keep track of your reading at home; Please bring your log for review at your next visit.  - We are also adding Hydrochlorothiazide daily to help with the swelling & fluid retention

## 2022-04-08 NOTE — PROGRESS NOTES
Subjective:     CC:   Chief Complaint   Patient presents with   • Diabetes     Lab review         HPI:   Patient is a 42 y.o. female here today for evaluation and management of:    Problem   Primary Hypertension    Taking lisinopril 5mg QD; no med side effects; Denies CP, palpitations, dizziness     Abnormal Papanicolaou Smear of Cervix With Positive Human Papilloma Virus (Hpv) Test    2/2021 ASCUS on PAP with +HPV; colpo with Dr De Jesus in 3/2021, no cancer; needs to establish with new GYN for 6 month followup (Dr De Jesus office closed per pt)     Vaginal Itching    Vaginal itching/burning for several weeks unresolved, nyastatin-triam made worse; STD testing was negative; HSV swab of lesion was negative  - rash improved with trial of valtrex in 1/2022; reports lesions are slowly coming back; not itchy or burning this time; only bothers her when she has sex; she has GYN appointment today and will consult with them on this as well     Elevated Cholesterol/High Density Lipoprotein Ratio    Taking atorvastatin 20mg QHS; no myalgia         Type 2 Diabetes Mellitus Without Complication, Without Long-Term Current Use of Insulin ([facility])    Last visit with karma/pharm 12/28,   Increased  metformin ER to 1000mg BID, started her on Glipizide 5mg QD, Pioglitazone 30mg QD (pt states insurance did not cover so she never started this)    Was on  Farxiga & cherelle but stopped 9/2021 due to cost; plans to switch back in 03/2022 when her new insurance plan kicks in; states had good weight loss results with it.     Mild Intermittent Asthma Without Complication    COVID +ve 3/2021; cough lingering; using albuterol prn; No fever, CP, SOB     Morbid Obesity (Hcc)    Was not able to follow through with bariatric consult due to insurance change; her current insurance will not cover bariatric services  She had asked for refill Phentermine at last visit 12/2021; states she is using this intermittently for appetite control; she gained a few  lbs since last visit  - will hold off on refilling phentermine right now; our focus will be on getting DM2 under control and working with insurance to get her back on affordable DM2 injectable which helped her weight in the past  - she is also taking magnesium supplement for cellular function       Past Medical History:   Diagnosis Date   • Asthma     child, occasional albuterol now.    • DM (diabetes mellitus) (HCC) 02/03/2020    diet controlled   • Primary hypertension 12/31/2021        Past Surgical History:   Procedure Laterality Date   • PB OPEN TX RADIAL & ULNAR SHAFT FX FIX RADIUS A*  2/4/2020    Procedure: ORIF, FRACTURE, RADIUS AND ULNA;  Surgeon: Ambrosio Lara M.D.;  Location: SURGERY [facility];  Service: General   • ORIF, WRIST Left 2/4/2020    Procedure: ORIF, WRIST- DISTAL RADIUS;  Surgeon: Ambrosio Lara M.D.;  Location: SURGERY [facility];  Service: General   • GYN SURGERY  2007/2010    c/s x2   • TUBAL COAGULATION LAPAROSCOPIC BILATERAL          Current Outpatient Medications on File Prior to Visit   Medication Sig Dispense Refill   • phentermine 37.5 MG capsule Take 37.5 mg by mouth every morning.     • Multiple Vitamin (MULTIVITAMIN ADULT PO) Take  by mouth.     • glipiZIDE (GLUCOTROL) 5 MG Tab Take 1 Tablet by mouth 2 times a day. 180 Tablet 1   • metFORMIN ER (GLUCOPHAGE XR) 500 MG TABLET SR 24 HR Take 2 Tablets by mouth 2 times a day. 360 Tablet 1   • lisinopril (PRINIVIL) 5 MG Tab Take 1 tablet by mouth every day. 90 tablet 3   • Lancets Lancets order: Lancets for f Insurance Preference. meter. Sig: use BID and prn ssx high or low sugar. #100 RF x 3 200 Each 0   • Blood Glucose Monitoring Suppl Device Meter: Dispense Device of Insurance Preference. Sig. Use as directed for blood sugar monitoring. #1. NR. 1 Each 0   • VITAMIN D-VITAMIN K PO Take 5,000 Units by mouth.     • Probiotic Product (PROBIOTIC DAILY PO) Take  by mouth.     • pioglitazone (ACTOS) 30 MG Tab Take 1 Tablet by  "mouth every day. (Patient not taking: Reported on 4/8/2022) 90 Tablet 1     No current facility-administered medications on file prior to visit.        ROS:  Review of Systems   Constitutional: Negative.  Negative for fever and malaise/fatigue.   HENT: Negative.    Eyes: Negative.    Respiratory: Negative for cough, sputum production and shortness of breath.    Cardiovascular: Positive for leg swelling. Negative for chest pain and palpitations.     Objective:     Exam:  /86 (BP Location: Left arm, Patient Position: Sitting, BP Cuff Size: Adult)   Pulse (!) 112   Temp 36.4 °C (97.5 °F) (Temporal)   Ht 1.638 m (5' 4.5\")   Wt 124 kg (274 lb)   SpO2 97%   BMI 46.31 kg/m²  Body mass index is 46.31 kg/m².    Physical Exam  Constitutional:       Appearance: Normal appearance.   Cardiovascular:      Rate and Rhythm: Normal rate and regular rhythm.      Pulses: Normal pulses.      Heart sounds: Normal heart sounds.   Pulmonary:      Effort: Pulmonary effort is normal.      Breath sounds: Normal breath sounds.   Musculoskeletal:      Cervical back: Normal range of motion and neck supple.      Right lower leg: Edema present.      Left lower leg: Edema present.   Neurological:      Mental Status: She is alert.       Labs: reviewed with patient    Assessment & Plan:     42 y.o. female with the following -     Problem List Items Addressed This Visit     Type 2 diabetes mellitus without complication, without long-term current use of insulin ([facility])     A1C 9.8 on 4/6/2022;   - continue Metformin 1000mg ER BID; Glipizide 5mg BID  - reports her insurance is somewhat situated now; she would like to go back on injectable if possible as she had weight loss success with trulicity in the past. She has upcoming appointment with Jake/pharm, I will message him to let me know if he needs help with pre-auth on her meds         Mild intermittent asthma without complication     Reports cough improved and albuterol usage reduced to " about once a week when sigulair was added  - continue Daily Singulair 10mg QD  - reassess symptoms at next visit          Relevant Medications    montelukast (SINGULAIR) 10 MG Tab    albuterol 108 (90 Base) MCG/ACT Aero Soln inhalation aerosol    Morbid obesity (HCC)    Relevant Medications    phentermine 37.5 MG capsule    Elevated cholesterol/high density lipoprotein ratio     LDL 82  - continue atorvastatin 20mg QHS  - next annual fasting lipid 4/2022         Relevant Medications    atorvastatin (LIPITOR) 20 MG Tab    Vaginal itching     - rash improved with trial of valtrex in 1/2022; reports lesions are slowly coming back; not itchy or burning this time; only bothers her when she has sex; she has GYN appointment today and will consult with them on this as well         Primary hypertension     BP in clinic today 144/86; she does not have home BP device; most recently she is noticing swelling in ankles, non-pitting on exam today  - continue lisinopril 5mg QD; add HCTZ 12.5mg QD to help with edema & BP management  - annual CMP/microalbum (due 11/2022)         Relevant Medications    atorvastatin (LIPITOR) 20 MG Tab    hydroCHLOROthiazide (HYDRODIURIL) 12.5 MG tablet    Abnormal Papanicolaou smear of cervix with positive human papilloma virus (HPV) test     Has GYN appt today at [facility] to follow up on abnormal PAP with negative colpo; states vaginal lesions came back 3 weeks ago; not itchy or burning. Bothers her only when she has sex.              Educated in proper administration of medication(s) ordered today including safety, possible SE, risks, benefits, rationale and alternatives to therapy.     Return in about 4 weeks (around 5/6/2022) for HTN management.    Please note that this dictation was created using voice recognition software. I have made every reasonable attempt to correct obvious errors, but I expect that there are errors of grammar and possibly content that I did not discover  before finalizing the note.

## 2022-04-08 NOTE — ASSESSMENT & PLAN NOTE
A1C 9.8 on 4/6/2022;   - continue Metformin 1000mg ER BID; Glipizide 5mg BID  - reports her insurance is somewhat situated now; she would like to go back on injectable if possible as she had weight loss success with trulicity in the past. She has upcoming appointment with Jake/pharm, I will message him to let me know if he needs help with pre-auth on her meds

## 2022-04-12 ENCOUNTER — NON-PROVIDER VISIT (OUTPATIENT)
Dept: MEDICAL GROUP | Facility: PHYSICIAN GROUP | Age: 42
End: 2022-04-12
Payer: COMMERCIAL

## 2022-04-12 PROCEDURE — 99401 PREV MED CNSL INDIV APPRX 15: CPT | Performed by: STUDENT IN AN ORGANIZED HEALTH CARE EDUCATION/TRAINING PROGRAM

## 2022-04-12 RX ORDER — DULAGLUTIDE 1.5 MG/.5ML
1 INJECTION, SOLUTION SUBCUTANEOUS
Qty: 2.24 ML | Refills: 6 | Status: SHIPPED | OUTPATIENT
Start: 2022-04-12 | End: 2022-06-29 | Stop reason: SDUPTHER

## 2022-04-12 RX ORDER — DAPAGLIFLOZIN 10 MG/1
1 TABLET, FILM COATED ORAL DAILY
Qty: 30 TABLET | Refills: 6 | Status: SHIPPED | OUTPATIENT
Start: 2022-04-12 | End: 2022-06-29 | Stop reason: SDUPTHER

## 2022-04-12 RX ORDER — IMIQUIMOD 12.5 MG/.25G
CREAM TOPICAL
COMMUNITY
Start: 2022-04-08 | End: 2023-03-31

## 2022-04-12 NOTE — PROGRESS NOTES
Patient Consult Note - Follow Up Visit  Primary care physician: Belén Quiroz D.N.P.    Reason for consult: Management of Uncontrolled Type 2 Diabetes    Time IN:  1:30pm  Time OUT:  1:50pm    HPI:  Marisabel Simons is a 42 y.o. old patient who comes in today for evaluation of above stated problem.    Most Recent HbA1c:   Lab Results   Component Value Date/Time    HBA1C 9.8 (H) 04/06/2022 11:42 AM        Current Diabetes Regimen:    Metformin ER 1000mg BID  Glipizide 5mg BID    Before Breakfast: has not been testing regularly  Before Lunch:  Before Dinner:  Before Bedtime:  Other times:  Hypoglycemia:  None    Foot Exam:  Monofilament exam - needs updating, will defer to next appt    Preventative Management  BP regimen (ACE/ARB) - Lisinopril 5mg QD  ASA - none  Statin - Atorvastatin 20mg QD  Last Retinal Scan - needs f/u with optometry  Last Foot Exam - 3/2021  Last A1c -   Lab Results   Component Value Date/Time    HBA1C 9.8 (H) 04/06/2022 11:42 AM      Last Microalbuminuria - 10/2021    updated caregaps    ROS:  Constitutional: No weight loss  Cardiac: No palpitations or racing heart  Resp: No shortness of breath  Neuro: No numbness or tinging in feet  Endo: No heat or cold intolerance, no polyuria or polydipsia  All other systems were reviewed and were negative.    Past Medical History:  Patient Active Problem List    Diagnosis Date Noted   • Primary hypertension 12/31/2021   • Abnormal Papanicolaou smear of cervix with positive human papilloma virus (HPV) test 12/31/2021   • Vitamin D deficiency 12/31/2021   • Well woman exam with routine gynecological exam 02/04/2021   • Vaginal itching 02/04/2021   • Elevated cholesterol/high density lipoprotein ratio 09/15/2020   • Closed fracture of left wrist 09/01/2020   • Type 2 diabetes mellitus without complication, without long-term current use of insulin ([facility]) 09/01/2020   • Mild intermittent asthma without complication 09/01/2020   • Morbid obesity ([facility])  09/01/2020       Past Surgical History:  Past Surgical History:   Procedure Laterality Date   • PB OPEN TX RADIAL & ULNAR SHAFT FX FIX RADIUS A*  2/4/2020    Procedure: ORIF, FRACTURE, RADIUS AND ULNA;  Surgeon: Ambrosio Lara M.D.;  Location: SURGERY [facility];  Service: General   • ORIF, WRIST Left 2/4/2020    Procedure: ORIF, WRIST- DISTAL RADIUS;  Surgeon: Ambrosio Lara M.D.;  Location: SURGERY [facility];  Service: General   • GYN SURGERY  2007/2010    c/s x2   • TUBAL COAGULATION LAPAROSCOPIC BILATERAL         Allergies:  Patient has no known allergies.    Social History:  Social History     Socioeconomic History   • Marital status:      Spouse name: Not on file   • Number of children: Not on file   • Years of education: Not on file   • Highest education level: Not on file   Occupational History   • Not on file   Tobacco Use   • Smoking status: Never Smoker   • Smokeless tobacco: Never Used   Vaping Use   • Vaping Use: Never used   Substance and Sexual Activity   • Alcohol use: Yes     Comment: 3 per week   • Drug use: Yes     Frequency: 1.0 times per week     Types: Marijuana     Comment: edible   • Sexual activity: Yes     Partners: Male     Birth control/protection: Female Sterilization   Other Topics Concern   • Not on file   Social History Narrative   • Not on file     Social Determinants of Health     Financial Resource Strain: Not on file   Food Insecurity: Not on file   Transportation Needs: Not on file   Physical Activity: Not on file   Stress: Not on file   Social Connections: Not on file   Intimate Partner Violence: Not on file   Housing Stability: Not on file       Family History:  Family History   Problem Relation Age of Onset   • Diabetes Mother    • Diabetes Father         renal failure   • Heart Disease Father         CHF   • Cancer Father        Medications:    Current Outpatient Medications:   •  phentermine 37.5 MG capsule, Take 37.5 mg by mouth every morning., Disp: ,  Rfl:   •  montelukast (SINGULAIR) 10 MG Tab, Take 1 Tablet by mouth every day., Disp: 90 Tablet, Rfl: 3  •  atorvastatin (LIPITOR) 20 MG Tab, Take 1 Tablet by mouth every day., Disp: 90 Tablet, Rfl: 3  •  albuterol 108 (90 Base) MCG/ACT Aero Soln inhalation aerosol, Inhale 2 Puffs every four hours as needed for Shortness of Breath., Disp: 1 Each, Rfl: 3  •  hydroCHLOROthiazide (HYDRODIURIL) 12.5 MG tablet, Take 1 Tablet by mouth every day., Disp: 90 Tablet, Rfl: 3  •  Multiple Vitamin (MULTIVITAMIN ADULT PO), Take  by mouth., Disp: , Rfl:   •  glipiZIDE (GLUCOTROL) 5 MG Tab, Take 1 Tablet by mouth 2 times a day., Disp: 180 Tablet, Rfl: 1  •  pioglitazone (ACTOS) 30 MG Tab, Take 1 Tablet by mouth every day. (Patient not taking: Reported on 4/8/2022), Disp: 90 Tablet, Rfl: 1  •  metFORMIN ER (GLUCOPHAGE XR) 500 MG TABLET SR 24 HR, Take 2 Tablets by mouth 2 times a day., Disp: 360 Tablet, Rfl: 1  •  lisinopril (PRINIVIL) 5 MG Tab, Take 1 tablet by mouth every day., Disp: 90 tablet, Rfl: 3  •  Lancets, Lancets order: Lancets for f Insurance Preference. meter. Sig: use BID and prn ssx high or low sugar. #100 RF x 3, Disp: 200 Each, Rfl: 0  •  Blood Glucose Monitoring Suppl Device, Meter: Dispense Device of Insurance Preference. Sig. Use as directed for blood sugar monitoring. #1. NR., Disp: 1 Each, Rfl: 0  •  VITAMIN D-VITAMIN K PO, Take 5,000 Units by mouth., Disp: , Rfl:   •  Probiotic Product (PROBIOTIC DAILY PO), Take  by mouth., Disp: , Rfl:     Labs: Reviewed    Physical Examination:  Vital signs: There were no vitals taken for this visit. There is no height or weight on file to calculate BMI.  General: No apparent distress, cooperative  Eyes: No scleral icterus or discharge  ENMT: Normal on external inspection of nose, lips, normal thyroid exam  Neck: No abnormal masses on inspection  Resp: Normal effort, clear to auscultation bilaterally   CVS: Regular rate and rhythm, S1 S2 normal, no murmur   Extremities: No  edema  Abdomen: abdominal obesity present  Neuro: Alert and oriented  Skin: No rash  Psych: Normal mood and affect, intact memory and able to make informed decisions    Assessment and Plan:    Patient is optimized on metformin.  Optimized on glipizide.  Was not able to start pioglitazone as insurance did not cover and was expensive.  A1c continues to be significantly elevated at 9.8.  She has commercial insurance reinstated and would like to get back to previous drug regimen that included GLP1i and SGLT2i.    Start Trulicity 0.75mg once weekly.  Start Farxiga 10mg QD.  Continue all other medications for now.  Discussed importance of dietary modifications and need to minimize/eliminate simple carb intake, especially with GLP1i introduction.  Increased exercise routine, will follow up at next visit.    Follow Up:  About four weeks.    Thank you for allowing me to participate in the care of this patient.    Jake Hazel, PharmD, BCACP  04/12/22    CC:   Belén Quiroz D.N.P.

## 2022-04-15 ENCOUNTER — ANTICOAGULATION MONITORING (OUTPATIENT)
Dept: MEDICAL GROUP | Facility: PHYSICIAN GROUP | Age: 42
End: 2022-04-15
Payer: COMMERCIAL

## 2022-06-14 ENCOUNTER — OFFICE VISIT (OUTPATIENT)
Dept: MEDICAL GROUP | Facility: PHYSICIAN GROUP | Age: 42
End: 2022-06-14
Payer: COMMERCIAL

## 2022-06-14 VITALS
HEART RATE: 93 BPM | OXYGEN SATURATION: 93 % | HEIGHT: 65 IN | WEIGHT: 268 LBS | SYSTOLIC BLOOD PRESSURE: 128 MMHG | DIASTOLIC BLOOD PRESSURE: 68 MMHG | BODY MASS INDEX: 44.65 KG/M2 | TEMPERATURE: 98.2 F

## 2022-06-14 DIAGNOSIS — F15.90: ICD-10-CM

## 2022-06-14 DIAGNOSIS — E66.01 MORBID OBESITY (HCC): ICD-10-CM

## 2022-06-14 PROCEDURE — 99214 OFFICE O/P EST MOD 30 MIN: CPT | Performed by: NURSE PRACTITIONER

## 2022-06-14 RX ORDER — PHENTERMINE HYDROCHLORIDE 37.5 MG/1
37.5 CAPSULE ORAL EVERY MORNING
Qty: 30 CAPSULE | Refills: 2 | Status: SHIPPED | OUTPATIENT
Start: 2022-06-14 | End: 2022-07-14

## 2022-06-14 ASSESSMENT — FIBROSIS 4 INDEX: FIB4 SCORE: 0.9

## 2022-06-14 NOTE — PROGRESS NOTES
Subjective:     CC:   Chief Complaint   Patient presents with   • Obesity     Weight loss options         HPI:   Patient is a 42 y.o. female here today for evaluation and management of:    Problem   Morbid Obesity (Hcc)    Was not able to follow through with bariatric consult due to insurance change; her current insurance will not cover bariatric services.  She met with Jake/Willow for assistance with Diabetes mgt and is back on Trulicity & Farxiga. Next A1C check will be in July. I messaged Jake about pre-auth for Trulicity.  She is struggling with food choices and is open to seeing dietician/nutritoinist for added accountability.   She is also asking for phentermine refill today which she uses intermittently for appetite control. She understands this is not a long term medication and to focus on other areas of her weight management with food choices/increasing activity level.  - she is also taking magnesium supplement for cellular function         Patient Active Problem List   Diagnosis   • Closed fracture of left wrist   • Type 2 diabetes mellitus without complication, without long-term current use of insulin ([facility])   • Mild intermittent asthma without complication   • Morbid obesity ([facility])   • Elevated cholesterol/high density lipoprotein ratio   • Well woman exam with routine gynecological exam   • Vaginal itching   • Primary hypertension   • Abnormal Papanicolaou smear of cervix with positive human papilloma virus (HPV) test   • Vitamin D deficiency       Past Medical History:   Diagnosis Date   • Asthma     child, occasional albuterol now.    • DM (diabetes mellitus) ([facility]) 02/03/2020    diet controlled   • Primary hypertension 12/31/2021        Past Surgical History:   Procedure Laterality Date   • PB OPEN TX RADIAL & ULNAR SHAFT FX FIX RADIUS A*  2/4/2020    Procedure: ORIF, FRACTURE, RADIUS AND ULNA;  Surgeon: Ambrosio Lara M.D.;  Location: SURGERY [facility];  Service: General   • ORIF, WRIST Left  2/4/2020    Procedure: ORIF, WRIST- DISTAL RADIUS;  Surgeon: Ambrosio Lara M.D.;  Location: SURGERY [facility];  Service: General   • GYN SURGERY  2007/2010    c/s x2   • TUBAL COAGULATION LAPAROSCOPIC BILATERAL          Current Outpatient Medications on File Prior to Visit   Medication Sig Dispense Refill   • imiquimod (ALDARA) 5 % cream 1 APPLICATION ON THE SKIN 3 TIMES WEEKLY     • Dapagliflozin Propanediol (FARXIGA) 10 MG Tab Take 1 Tablet by mouth every day. 30 Tablet 6   • Dulaglutide (TRULICITY) 1.5 MG/0.5ML Solution Pen-injector Inject 1 PEN under the skin every 7 days. 2.24 mL 6   • montelukast (SINGULAIR) 10 MG Tab Take 1 Tablet by mouth every day. 90 Tablet 3   • atorvastatin (LIPITOR) 20 MG Tab Take 1 Tablet by mouth every day. 90 Tablet 3   • albuterol 108 (90 Base) MCG/ACT Aero Soln inhalation aerosol Inhale 2 Puffs every four hours as needed for Shortness of Breath. 1 Each 3   • hydroCHLOROthiazide (HYDRODIURIL) 12.5 MG tablet Take 1 Tablet by mouth every day. 90 Tablet 3   • Multiple Vitamin (MULTIVITAMIN ADULT PO) Take  by mouth.     • glipiZIDE (GLUCOTROL) 5 MG Tab Take 1 Tablet by mouth 2 times a day. 180 Tablet 1   • metFORMIN ER (GLUCOPHAGE XR) 500 MG TABLET SR 24 HR Take 2 Tablets by mouth 2 times a day. 360 Tablet 1   • lisinopril (PRINIVIL) 5 MG Tab Take 1 tablet by mouth every day. 90 tablet 3   • Lancets Lancets order: Lancets for f Insurance Preference. meter. Sig: use BID and prn ssx high or low sugar. #100 RF x 3 200 Each 0   • Blood Glucose Monitoring Suppl Device Meter: Dispense Device of Insurance Preference. Sig. Use as directed for blood sugar monitoring. #1. NR. 1 Each 0   • VITAMIN D-VITAMIN K PO Take 5,000 Units by mouth.     • Probiotic Product (PROBIOTIC DAILY PO) Take  by mouth.       No current facility-administered medications on file prior to visit.      Objective:     Exam:  /68 (BP Location: Left arm, Patient Position: Sitting, BP Cuff Size: Large adult)    "Pulse 93   Temp 36.8 °C (98.2 °F) (Temporal)   Ht 1.638 m (5' 4.5\")   Wt 122 kg (268 lb)   SpO2 93%   BMI 45.29 kg/m²  Body mass index is 45.29 kg/m².    Physical Exam  Constitutional:       Appearance: Normal appearance.   Cardiovascular:      Rate and Rhythm: Normal rate and regular rhythm.      Pulses: Normal pulses.      Heart sounds: Normal heart sounds.   Pulmonary:      Effort: Pulmonary effort is normal.      Breath sounds: Normal breath sounds.   Neurological:      General: No focal deficit present.      Mental Status: She is alert and oriented to person, place, and time.   Psychiatric:         Mood and Affect: Mood normal.         Behavior: Behavior normal.         Thought Content: Thought content normal.         Judgment: Judgment normal.       Assessment & Plan:     42 y.o. female with the following -     Problem List Items Addressed This Visit     Morbid obesity (HCC)     - refilled phentermine 37.mg QD; counseled on dietary intake, getting back to exercising.   - PDMP reviewed & appropriate; last filled #90 on 12/1/2021  - annual CS agreement signed 12/2021  - UDS done today (will need for any refills)           Relevant Medications    phentermine 37.5 MG capsule    Other Relevant Orders    Referral to Nutrition Services    PAIN MANAGEMENT [facility], [facility]      Other Visit Diagnoses     Uses central nervous system stimulants        Relevant Orders    PAIN MANAGEMENT [facility], [facility]        Medications Prescribed Today:  1. Morbid obesity (HCC)  - phentermine 37.5 MG capsule; Take 1 Capsule by mouth every morning for 30 days.  Dispense: 30 Capsule; Refill: 2    Educated in proper administration of medication(s) ordered today including safety, possible SE, risks, benefits, rationale and alternatives to therapy.     Return in about 3 months (around 9/14/2022) for DM2-A1C, Retinal screen, monofilament.    Please note that this dictation was created using voice recognition software. I have made every reasonable " attempt to correct obvious errors, but I expect that there are errors of grammar and possibly content that I did not discover before finalizing the note.

## 2022-06-14 NOTE — ASSESSMENT & PLAN NOTE
- refilled phentermine 37.mg QD; counseled on dietary intake, getting back to exercising.   - PDMP reviewed & appropriate; last filled #90 on 12/1/2021  - annual CS agreement signed 12/2021  - UDS done today (will need for any refills)

## 2022-06-15 ENCOUNTER — DOCUMENTATION (OUTPATIENT)
Dept: VASCULAR LAB | Facility: MEDICAL CENTER | Age: 42
End: 2022-06-15
Payer: COMMERCIAL

## 2022-06-15 NOTE — PROGRESS NOTES
Received note that pt's Trulicity copay is unaffordable.    Called and LVM for pt to try a coupon at the pharmacy.     Directed her to the  website as they have a copay coupon for $25/fill.    Asked that pt c/b if she continues to have affordability issues.    Francis iMtchell, MeraD, BCACP

## 2022-06-29 RX ORDER — DAPAGLIFLOZIN 10 MG/1
10 TABLET, FILM COATED ORAL DAILY
Qty: 30 TABLET | Refills: 0 | Status: SHIPPED | OUTPATIENT
Start: 2022-06-29 | End: 2023-03-31

## 2022-06-29 RX ORDER — DULAGLUTIDE 1.5 MG/.5ML
1 INJECTION, SOLUTION SUBCUTANEOUS
Qty: 2.24 ML | Refills: 0 | Status: SHIPPED | OUTPATIENT
Start: 2022-06-29 | End: 2022-08-31 | Stop reason: SDUPTHER

## 2022-06-29 NOTE — TELEPHONE ENCOUNTER
Received request via: Patient    Was the patient seen in the last year in this department? Yes    Does the patient have an active prescription (recently filled or refills available) for medication(s) requested? No     Pt has requested these prescriptions be sent to [facility] on [address]

## 2022-08-24 RX ORDER — GLIPIZIDE 5 MG/1
TABLET ORAL
Qty: 180 TABLET | Refills: 3 | Status: SHIPPED | OUTPATIENT
Start: 2022-08-24 | End: 2023-03-31

## 2022-08-24 RX ORDER — METFORMIN HYDROCHLORIDE 500 MG/1
TABLET, EXTENDED RELEASE ORAL
Qty: 360 TABLET | Refills: 3 | Status: SHIPPED | OUTPATIENT
Start: 2022-08-24 | End: 2023-08-04

## 2022-08-31 RX ORDER — DULAGLUTIDE 1.5 MG/.5ML
1 INJECTION, SOLUTION SUBCUTANEOUS
Qty: 2.24 ML | Refills: 0 | Status: SHIPPED | OUTPATIENT
Start: 2022-08-31 | End: 2022-10-26

## 2022-10-26 RX ORDER — DULAGLUTIDE 1.5 MG/.5ML
INJECTION, SOLUTION SUBCUTANEOUS
Qty: 2 ML | Refills: 3 | Status: SHIPPED | OUTPATIENT
Start: 2022-10-26 | End: 2023-03-08

## 2023-03-08 RX ORDER — DULAGLUTIDE 1.5 MG/.5ML
INJECTION, SOLUTION SUBCUTANEOUS
Qty: 2 ML | Refills: 3 | Status: SHIPPED | OUTPATIENT
Start: 2023-03-08 | End: 2023-03-31

## 2023-03-17 ENCOUNTER — OFFICE VISIT (OUTPATIENT)
Dept: MEDICAL GROUP | Facility: PHYSICIAN GROUP | Age: 43
End: 2023-03-17
Payer: COMMERCIAL

## 2023-03-17 ENCOUNTER — HOSPITAL ENCOUNTER (OUTPATIENT)
Dept: RADIOLOGY | Facility: MEDICAL CENTER | Age: 43
End: 2023-03-17
Attending: NURSE PRACTITIONER
Payer: COMMERCIAL

## 2023-03-17 ENCOUNTER — HOSPITAL ENCOUNTER (OUTPATIENT)
Facility: MEDICAL CENTER | Age: 43
End: 2023-03-17
Attending: NURSE PRACTITIONER
Payer: COMMERCIAL

## 2023-03-17 VITALS
TEMPERATURE: 97.4 F | OXYGEN SATURATION: 96 % | HEIGHT: 65 IN | HEART RATE: 88 BPM | WEIGHT: 281 LBS | BODY MASS INDEX: 46.82 KG/M2

## 2023-03-17 DIAGNOSIS — M25.552 LEFT HIP PAIN: ICD-10-CM

## 2023-03-17 DIAGNOSIS — R82.90 CLOUDY URINE: ICD-10-CM

## 2023-03-17 LAB
APPEARANCE UR: CLEAR
BILIRUB UR STRIP-MCNC: NEGATIVE MG/DL
COLOR UR AUTO: YELLOW
GLUCOSE UR STRIP.AUTO-MCNC: NEGATIVE MG/DL
KETONES UR STRIP.AUTO-MCNC: NEGATIVE MG/DL
LEUKOCYTE ESTERASE UR QL STRIP.AUTO: NEGATIVE
NITRITE UR QL STRIP.AUTO: NEGATIVE
PH UR STRIP.AUTO: 7.5 [PH] (ref 5–8)
PROT UR QL STRIP: NEGATIVE MG/DL
RBC UR QL AUTO: NEGATIVE
SP GR UR STRIP.AUTO: 1.02
UROBILINOGEN UR STRIP-MCNC: NORMAL MG/DL

## 2023-03-17 PROCEDURE — 81002 URINALYSIS NONAUTO W/O SCOPE: CPT | Performed by: NURSE PRACTITIONER

## 2023-03-17 PROCEDURE — 73502 X-RAY EXAM HIP UNI 2-3 VIEWS: CPT | Mod: LT

## 2023-03-17 PROCEDURE — 99214 OFFICE O/P EST MOD 30 MIN: CPT | Performed by: NURSE PRACTITIONER

## 2023-03-17 PROCEDURE — 87086 URINE CULTURE/COLONY COUNT: CPT

## 2023-03-17 RX ORDER — CYCLOBENZAPRINE HCL 5 MG
5 TABLET ORAL 3 TIMES DAILY PRN
Qty: 30 TABLET | Refills: 0 | Status: SHIPPED | OUTPATIENT
Start: 2023-03-17 | End: 2023-03-31

## 2023-03-17 ASSESSMENT — PATIENT HEALTH QUESTIONNAIRE - PHQ9: CLINICAL INTERPRETATION OF PHQ2 SCORE: 0

## 2023-03-17 NOTE — PROGRESS NOTES
Chief Complaint   Patient presents with    Pain     Dull achying pain, starts in Left hip radiates to lower middle back and radiates to front of abdomen.        HISTORY OF PRESENT ILLNESS: Marisabel Simons is a 43 y.o. female established patient who presents today to discuss:  - dull ache inside left hip, radiates to left lower back and to front groin area; started yesterday evening when she was sitting at work meeting. Tried midol with not much relief.   - mentions she had kidney stones in the past & pain feels similar     Current Outpatient Medications on File Prior to Visit   Medication Sig Dispense Refill    TRULICITY 1.5 MG/0.5ML Solution Pen-injector INJECT 1 PEN SUBCUTANEOUSLYEVERY 7 DAYS 2 mL 3    metFORMIN ER (GLUCOPHAGE XR) 500 MG TABLET SR 24 HR TAKE 2 TABLETS TWICE A  Tablet 3    glipiZIDE (GLUCOTROL) 5 MG Tab TAKE 1 TABLET TWICE A  Tablet 3    dapagliflozin propanediol (FARXIGA) 10 MG Tab Take 1 Tablet by mouth every day. 30 Tablet 0    imiquimod (ALDARA) 5 % cream 1 APPLICATION ON THE SKIN 3 TIMES WEEKLY      montelukast (SINGULAIR) 10 MG Tab Take 1 Tablet by mouth every day. 90 Tablet 3    atorvastatin (LIPITOR) 20 MG Tab Take 1 Tablet by mouth every day. 90 Tablet 3    albuterol 108 (90 Base) MCG/ACT Aero Soln inhalation aerosol Inhale 2 Puffs every four hours as needed for Shortness of Breath. 1 Each 3    hydroCHLOROthiazide (HYDRODIURIL) 12.5 MG tablet Take 1 Tablet by mouth every day. 90 Tablet 3    Multiple Vitamin (MULTIVITAMIN ADULT PO) Take  by mouth.      lisinopril (PRINIVIL) 5 MG Tab Take 1 tablet by mouth every day. 90 tablet 3    Lancets Lancets order: Lancets for f Insurance Preference. meter. Sig: use BID and prn ssx high or low sugar. #100 RF x 3 200 Each 0    Blood Glucose Monitoring Suppl Device Meter: Dispense Device of Insurance Preference. Sig. Use as directed for blood sugar monitoring. #1. NR. 1 Each 0    VITAMIN D-VITAMIN K PO Take 5,000 Units by mouth.       "Probiotic Product (PROBIOTIC DAILY PO) Take  by mouth.       No current facility-administered medications on file prior to visit.       has a past medical history of Asthma, DM (diabetes mellitus) ([facility]) (02/03/2020), and Primary hypertension (12/31/2021).     Patient Active Problem List   Diagnosis    Closed fracture of left wrist    Type 2 diabetes mellitus without complication, without long-term current use of insulin (HCC)    Mild intermittent asthma without complication    Morbid obesity ([facility])    Elevated cholesterol/high density lipoprotein ratio    Well woman exam with routine gynecological exam    Vaginal itching    Primary hypertension    Abnormal Papanicolaou smear of cervix with positive human papilloma virus (HPV) test    Vitamin D deficiency        Allergies:Patient has no known allergies.    Health Maintenance: deferred  Review of Systems -included above  Exam:   Pulse 88   Temp 36.3 °C (97.4 °F)   Ht 1.638 m (5' 4.5\")   Wt (!) 127 kg (281 lb)   SpO2 96%   Body mass index is 47.49 kg/m².   Physical Exam  Constitutional:       Appearance: Normal appearance.   Abdominal:      Tenderness: There is no right CVA tenderness or left CVA tenderness.   Musculoskeletal:         General: Normal range of motion.      Cervical back: Normal.      Thoracic back: Normal.      Lumbar back: Tenderness present. No swelling, edema, deformity or signs of trauma. Normal range of motion.        Back:       Right lower leg: No edema.      Left lower leg: No edema.   Neurological:      Mental Status: She is alert.      Latest Reference Range & Units 03/17/23 11:30   POC Color Negative  yellow   POC Appearance Negative  clear   POC Specific Gravity <1.005 - >1.030  1.025   POC Urine PH 5.0 - 8.0  7.5   POC Glucose Negative mg/dL negative   POC Ketones Negative mg/dL negative   POC Protein Negative mg/dL negative   POC Nitrites Negative  negative   POC Leukocyte Esterase Negative  negative   POC Blood Negative  negative   POC " Bilirubin Negative mg/dL negative   POC Urobiligen Negative (0.2) mg/dL 0.2e.u     Assessment/Plan:  1. Left hip pain  2. Cloudy urine  Left hip pain radiating across lower back and anterior hip area x1 day. Denies numbness, tingling in legs. No relief with midol. She maintains full ROM in hip joint and gait is normal. Tender with deep palpation left lower back/buttock area. Presenting like muscle strain vs joint impingement, will get xray. In interim start conservative management with prn ibuprofen, heat application, stretches. Also added prn muscle relaxant. She is concerned this may be kidney stone and we checked UA today; was WNL but cloudiness noted so will send out urine culture. Can consider renal US if no improvement with NSAIDS the next few days.    - cyclobenzaprine (FLEXERIL) 5 mg tablet; Take 1 Tablet by mouth 3 times a day as needed for Moderate Pain.  Dispense: 30 Tablet; Refill: 0  - DX-HIP-COMPLETE - UNILATERAL 2+ LEFT; Future  - POCT Urinalysis    Follow up:  Return in about 2 weeks (around 3/31/2023) for left lumbar pain.    Educated in proper administration of medication(s) ordered today including safety, possible SE, risks, benefits, rationale and alternatives to therapy.       Please note that this dictation was created using voice recognition software. I have made every reasonable attempt to correct obvious errors, but I expect that there are errors of grammar and possibly content that I did not discover before finalizing the note.

## 2023-03-17 NOTE — LETTER
[employer] [facility]  MARIZOL GrewalP.  [address]  [place] [address]  Fax: [phone]   Authorization for Release/Disclosure of   Protected Health Information   Name: MELISSA SIMONS : 1980 SSN: [id number]   Address: [address] [place] [id number] Phone:    [phone] (home) [phone] (work)   I authorize the entity listed below to release/disclose the PHI below to:   [employer]/Belén Quiroz D.N.P. and Belén Quiroz D.N.P.   Provider or Entity Name:     Address   City, State, Zip   Phone:      Fax:     Reason for request: continuity of care   Information to be released:    [  ] LAST COLONOSCOPY,  including any PATH REPORT and follow-up  [  ] LAST FIT/COLOGUARD RESULT [  ] LAST DEXA  [  ] LAST MAMMOGRAM  [  ] LAST PAP  [  ] LAST LABS [  ] RETINA EXAM REPORT  [  ] IMMUNIZATION RECORDS  [  ] Release all info      [  ] Check here and initial the line next to each item to release ALL health information INCLUDING  _____ Care and treatment for drug and / or alcohol abuse  _____ HIV testing, infection status, or AIDS  _____ Genetic Testing    DATES OF SERVICE OR TIME PERIOD TO BE DISCLOSED: _____________  I understand and acknowledge that:  * This Authorization may be revoked at any time by you in writing, except if your health information has already been used or disclosed.  * Your health information that will be used or disclosed as a result of you signing this authorization could be re-disclosed by the recipient. If this occurs, your re-disclosed health information may no longer be protected by State or Federal laws.  * You may refuse to sign this Authorization. Your refusal will not affect your ability to obtain treatment.  * This Authorization becomes effective upon signing and will  on (date) __________.      If no date is indicated, this Authorization will  one (1) year from the signature date.    Name: Melissa Simons  Signature: Date:   3/17/2023     PLEASE FAX  REQUESTED RECORDS BACK TO: [phone]

## 2023-03-20 LAB
BACTERIA UR CULT: NORMAL
SIGNIFICANT IND 70042: NORMAL
SITE SITE: NORMAL
SOURCE SOURCE: NORMAL

## 2023-03-31 ENCOUNTER — OFFICE VISIT (OUTPATIENT)
Dept: MEDICAL GROUP | Facility: PHYSICIAN GROUP | Age: 43
End: 2023-03-31
Payer: COMMERCIAL

## 2023-03-31 ENCOUNTER — HOSPITAL ENCOUNTER (OUTPATIENT)
Dept: LAB | Facility: MEDICAL CENTER | Age: 43
End: 2023-03-31
Attending: NURSE PRACTITIONER
Payer: COMMERCIAL

## 2023-03-31 VITALS
HEIGHT: 65 IN | DIASTOLIC BLOOD PRESSURE: 82 MMHG | TEMPERATURE: 97 F | WEIGHT: 276 LBS | HEART RATE: 91 BPM | BODY MASS INDEX: 45.98 KG/M2 | OXYGEN SATURATION: 98 % | SYSTOLIC BLOOD PRESSURE: 132 MMHG

## 2023-03-31 DIAGNOSIS — E55.9 VITAMIN D DEFICIENCY: ICD-10-CM

## 2023-03-31 DIAGNOSIS — Z11.3 ROUTINE SCREENING FOR STI (SEXUALLY TRANSMITTED INFECTION): ICD-10-CM

## 2023-03-31 DIAGNOSIS — E11.9 TYPE 2 DIABETES MELLITUS WITHOUT COMPLICATION, WITHOUT LONG-TERM CURRENT USE OF INSULIN (HCC): ICD-10-CM

## 2023-03-31 DIAGNOSIS — Z13.228 SCREENING FOR METABOLIC DISORDER: ICD-10-CM

## 2023-03-31 DIAGNOSIS — E66.01 MORBID OBESITY WITH BODY MASS INDEX (BMI) OF 45.0 TO 49.9 IN ADULT (HCC): ICD-10-CM

## 2023-03-31 DIAGNOSIS — J45.20 MILD INTERMITTENT ASTHMA WITHOUT COMPLICATION: ICD-10-CM

## 2023-03-31 DIAGNOSIS — I10 PRIMARY HYPERTENSION: ICD-10-CM

## 2023-03-31 DIAGNOSIS — Z23 NEED FOR VACCINATION: ICD-10-CM

## 2023-03-31 DIAGNOSIS — Z11.59 NEED FOR HEPATITIS C SCREENING TEST: ICD-10-CM

## 2023-03-31 DIAGNOSIS — E78.6 ELEVATED CHOLESTEROL/HIGH DENSITY LIPOPROTEIN RATIO: ICD-10-CM

## 2023-03-31 LAB
CREAT UR-MCNC: 140.37 MG/DL
ERYTHROCYTE [DISTWIDTH] IN BLOOD BY AUTOMATED COUNT: 45 FL (ref 35.9–50)
EST. AVERAGE GLUCOSE BLD GHB EST-MCNC: 263 MG/DL
HBA1C MFR BLD: 10.8 % (ref 4–5.6)
HCT VFR BLD AUTO: 43.8 % (ref 37–47)
HGB BLD-MCNC: 13.2 G/DL (ref 12–16)
MCH RBC QN AUTO: 24.2 PG (ref 27–33)
MCHC RBC AUTO-ENTMCNC: 30.1 G/DL (ref 33.6–35)
MCV RBC AUTO: 80.4 FL (ref 81.4–97.8)
MICROALBUMIN UR-MCNC: 15.4 MG/DL
MICROALBUMIN/CREAT UR: 110 MG/G (ref 0–30)
PLATELET # BLD AUTO: 364 K/UL (ref 164–446)
PMV BLD AUTO: 10.2 FL (ref 9–12.9)
RBC # BLD AUTO: 5.45 M/UL (ref 4.2–5.4)
WBC # BLD AUTO: 8.6 K/UL (ref 4.8–10.8)

## 2023-03-31 PROCEDURE — 99214 OFFICE O/P EST MOD 30 MIN: CPT | Mod: 25 | Performed by: NURSE PRACTITIONER

## 2023-03-31 PROCEDURE — 87389 HIV-1 AG W/HIV-1&-2 AB AG IA: CPT

## 2023-03-31 PROCEDURE — 85027 COMPLETE CBC AUTOMATED: CPT

## 2023-03-31 PROCEDURE — 87591 N.GONORRHOEAE DNA AMP PROB: CPT

## 2023-03-31 PROCEDURE — 83036 HEMOGLOBIN GLYCOSYLATED A1C: CPT

## 2023-03-31 PROCEDURE — 36415 COLL VENOUS BLD VENIPUNCTURE: CPT

## 2023-03-31 PROCEDURE — 82306 VITAMIN D 25 HYDROXY: CPT

## 2023-03-31 PROCEDURE — 86780 TREPONEMA PALLIDUM: CPT

## 2023-03-31 PROCEDURE — 82043 UR ALBUMIN QUANTITATIVE: CPT

## 2023-03-31 PROCEDURE — 90472 IMMUNIZATION ADMIN EACH ADD: CPT | Performed by: NURSE PRACTITIONER

## 2023-03-31 PROCEDURE — 92250 FUNDUS PHOTOGRAPHY W/I&R: CPT | Mod: TC | Performed by: NURSE PRACTITIONER

## 2023-03-31 PROCEDURE — 90471 IMMUNIZATION ADMIN: CPT | Performed by: NURSE PRACTITIONER

## 2023-03-31 PROCEDURE — 84443 ASSAY THYROID STIM HORMONE: CPT

## 2023-03-31 PROCEDURE — 82570 ASSAY OF URINE CREATININE: CPT

## 2023-03-31 PROCEDURE — 86803 HEPATITIS C AB TEST: CPT

## 2023-03-31 PROCEDURE — 87491 CHLMYD TRACH DNA AMP PROBE: CPT

## 2023-03-31 PROCEDURE — 90715 TDAP VACCINE 7 YRS/> IM: CPT | Performed by: NURSE PRACTITIONER

## 2023-03-31 PROCEDURE — 90677 PCV20 VACCINE IM: CPT | Performed by: NURSE PRACTITIONER

## 2023-03-31 PROCEDURE — 80053 COMPREHEN METABOLIC PANEL: CPT

## 2023-03-31 PROCEDURE — 80061 LIPID PANEL: CPT

## 2023-03-31 RX ORDER — ATORVASTATIN CALCIUM 20 MG/1
20 TABLET, FILM COATED ORAL DAILY
Qty: 90 TABLET | Refills: 3 | Status: SHIPPED | OUTPATIENT
Start: 2023-03-31

## 2023-03-31 RX ORDER — ALBUTEROL SULFATE 90 UG/1
2 AEROSOL, METERED RESPIRATORY (INHALATION) EVERY 4 HOURS PRN
Qty: 1 EACH | Refills: 3 | Status: SHIPPED | OUTPATIENT
Start: 2023-03-31 | End: 2023-10-10 | Stop reason: SDUPTHER

## 2023-03-31 RX ORDER — LISINOPRIL 5 MG/1
5 TABLET ORAL DAILY
Qty: 90 TABLET | Refills: 3 | Status: SHIPPED | OUTPATIENT
Start: 2023-03-31

## 2023-03-31 RX ORDER — HYDROCHLOROTHIAZIDE 12.5 MG/1
12.5 TABLET ORAL DAILY
Qty: 90 TABLET | Refills: 3 | Status: SHIPPED | OUTPATIENT
Start: 2023-03-31

## 2023-03-31 NOTE — PROGRESS NOTES
Subjective       CC:   Chief Complaint   Patient presents with    Requesting Labs        HPI:   Patient is a 43 y.o. established female patient with medical history listed below here today for evaluation and management of hypertension, diabetes mellitus type 2, asthma, vit D deficiency.     Problem   Primary Hypertension    Taking lisinopril 5mg QD, HCTZ 12.5mg QD (added 4/2022)  Also with hx diabetes mellitus type 2, dyslipidemia     Vitamin D Deficiency    Taking daily MVI     Elevated Cholesterol/High Density Lipoprotein Ratio    Taking atorvastatin 20mg QHS  Also with hx diabetes mellitus type 2, hypertension          Type 2 Diabetes Mellitus Without Complication, Without Long-Term Current Use of Insulin (Hcc)    Taking Metformin 1000mg BID, Trulicity 1.5mg Q7days  Last visit with karma/pharm 12/28/2022; d/juanita glipizide when she restarted Trulicity. Farxiga gave her yeast infection. Insurance did not cover Pioglitazone so she never took this.  She would like to change GLP-1 to Ozempic for weight loss benefits.     ; Microalbum creat ratio 110  HTN managed on Lisinopril, HCTZ   Dyslipidemia managed on Atorvastatin  No neuropathy reported. Normal Monofilament declined today, to be done at next visit  Retinal Screen: done today        Mild Intermittent Asthma Without Complication    PRN albuterol  Stopped Singular 2022 when post covid cough resolved     Morbid Obesity With Body Mass Index (Bmi) of 45.0 to 49.9 in Adult (formerly [facility])    Body mass index is 46.64 kg/m².           Patient Active Problem List   Diagnosis    Closed fracture of left wrist    Type 2 diabetes mellitus without complication, without long-term current use of insulin (Formerly [facility])    Mild intermittent asthma without complication    Morbid obesity with body mass index (BMI) of 45.0 to 49.9 in adult (Formerly [facility])    Elevated cholesterol/high density lipoprotein ratio    Well woman exam with routine gynecological exam    Vaginal itching    Primary hypertension     Abnormal Papanicolaou smear of cervix with positive human papilloma virus (HPV) test    Vitamin D deficiency       Past Medical History:   Diagnosis Date    Asthma     child, occasional albuterol now.     DM (diabetes mellitus) (Formerly [facility]) 02/03/2020    diet controlled    Primary hypertension 12/31/2021        Past Surgical History:   Procedure Laterality Date    PB OPEN TX RADIAL & ULNAR SHAFT FX FIX RADIUS A*  2/4/2020    Procedure: ORIF, FRACTURE, RADIUS AND ULNA;  Surgeon: Ambrosio Lara M.D.;  Location: SURGERY [facility];  Service: General    ORIF, WRIST Left 2/4/2020    Procedure: ORIF, WRIST- DISTAL RADIUS;  Surgeon: Ambrosio Lara M.D.;  Location: SURGERY [facility];  Service: General    GYN SURGERY  2007/2010    c/s x2    TUBAL COAGULATION LAPAROSCOPIC BILATERAL          Current Outpatient Medications on File Prior to Visit   Medication Sig Dispense Refill    metFORMIN ER (GLUCOPHAGE XR) 500 MG TABLET SR 24 HR TAKE 2 TABLETS TWICE A  Tablet 3    Multiple Vitamin (MULTIVITAMIN ADULT PO) Take  by mouth.      Lancets Lancets order: Lancets for f Insurance Preference. meter. Sig: use BID and prn ssx high or low sugar. #100 RF x 3 200 Each 0    Blood Glucose Monitoring Suppl Device Meter: Dispense Device of Insurance Preference. Sig. Use as directed for blood sugar monitoring. #1. NR. 1 Each 0    VITAMIN D-VITAMIN K PO Take 5,000 Units by mouth.      Probiotic Product (PROBIOTIC DAILY PO) Take  by mouth.       No current facility-administered medications on file prior to visit.        Health Maintenance: Completed  - declined colonoscopy this year, she will reconsider next year    ROS:  Review of Systems   Constitutional: Negative.  Negative for fever and malaise/fatigue.   HENT: Negative.     Eyes: Negative.    Respiratory:  Negative for cough, sputum production and shortness of breath.    Cardiovascular:  Negative for chest pain, palpitations and leg swelling.   Gastrointestinal: Negative.   "  Genitourinary: Negative.    Musculoskeletal: Negative.    Neurological: Negative.    Endo/Heme/Allergies: Negative.    Psychiatric/Behavioral: Negative.       Objective       Exam:  /82   Pulse 91   Temp 36.1 °C (97 °F) (Temporal)   Ht 1.638 m (5' 4.5\")   Wt (!) 125 kg (276 lb)   SpO2 98%   BMI 46.64 kg/m²  Body mass index is 46.64 kg/m².    Physical Exam  Constitutional:       Appearance: Normal appearance.   Cardiovascular:      Rate and Rhythm: Normal rate and regular rhythm.      Pulses: Normal pulses.      Heart sounds: Normal heart sounds.   Pulmonary:      Effort: Pulmonary effort is normal.      Breath sounds: Normal breath sounds.   Musculoskeletal:      Right lower leg: No edema.      Left lower leg: No edema.   Skin:     General: Skin is warm and dry.   Neurological:      Mental Status: She is alert.       Assessment & Plan       43 y.o. female with the following -     Problem List Items Addressed This Visit       Type 2 diabetes mellitus without complication, without long-term current use of insulin (HCC)     Chronic; A1C goal < 6.5  A1C 10.8 on labs today (up from 9.8 in 4/2022); she no longer wishes to see pharm for management. She would like to switch trulicity to ozempic for added weight loss; she understands critical role of diet/exercise in diabetes management   - continue Metformin 1000mg ER BID;   - d/c trulicity  - start Semaglutie IJ 0.5mg Q7days; assess tolerance in 1 month and increase to 1 mg if doing okay. We will resubmit PA as well  - recheck a1c in 3 months; may need 3rd agent if not improving         Relevant Medications    Semaglutide,0.25 or 0.5MG/DOS, 2 MG/3ML Solution Pen-injector    Other Relevant Orders    CBC WITHOUT DIFFERENTIAL (Completed)    Comp Metabolic Panel (Completed)    Lipid Profile (Completed)    HEMOGLOBIN A1C (Completed)    TSH WITH REFLEX TO FT4 (Completed)    MICROALBUMIN CREAT RATIO URINE (Completed)    POCT Retinal Eye Exam    Mild intermittent " asthma without complication     Chronic; stable on as needed albuterol. Last used in 2022  States she only takes singulair when she has exacerbation of symptoms; no cough, CP, SOB reported today         Relevant Medications    albuterol 108 (90 Base) MCG/ACT Aero Soln inhalation aerosol    Morbid obesity with body mass index (BMI) of 45.0 to 49.9 in adult (Formerly [facility])     Was not able to follow through with bariatric consult due to insurance change; her current insurance will not cover bariatric services.  She met with Jake/PharmD for assistance with Diabetes mgt in 2022; was put back on Trulicity & Farxiga. States farxiga gave her yeast infections. She is not seeing weight loss with trulicity and would like to switch to ozempic so we are starting her at 0.5mg for the first month as part of her diabetes mellitus type 2 mgt and for weight loss. She understands to also focus on other areas of her weight management with food choices/increasing activity level.  Has also used phentermine in the past.            Relevant Medications    Semaglutide,0.25 or 0.5MG/DOS, 2 MG/3ML Solution Pen-injector    Elevated cholesterol/high density lipoprotein ratio      Latest Reference Range & Units 03/31/23 11:37   Cholesterol,Tot 100 - 199 mg/dL 164   Triglycerides 0 - 149 mg/dL 79   HDL >=40 mg/dL 55   LDL <100 mg/dL 93   The 10-year ASCVD risk score (Erick GRAHAM, et al., 2019) is: 1.6%    Chronic; LDL goal < 100  Tolerating statin, no myalgia. On metformin, trulicity (switching to Ozempic)for diabetes mellitus type 2, lisinopril & HCTZ for hypertension   - continue Atorvastatin 20mg QHS  - monitor annual fasting lipid  - counseled on heart healthy diet, staying active with exercise         Relevant Medications    atorvastatin (LIPITOR) 20 MG Tab    Primary hypertension     Chronic; goal BP < 130/80  BP in clinic today 132/82; denies CP, palpitations, edema improved with HCTZ; she does not have home BP device;   - continue lisinopril 5mg  QD, HCTZ 12.5mg QD to help with edema & BP management  - annual CMP/microalbum (due today)         Relevant Medications    lisinopril (PRINIVIL) 5 MG Tab    hydroCHLOROthiazide (HYDRODIURIL) 12.5 MG tablet    atorvastatin (LIPITOR) 20 MG Tab    Vitamin D deficiency      Latest Reference Range & Units 03/31/23 11:37   25-Hydroxy   Vitamin D 25 30 - 100 ng/mL 42   - continue daily MVI  - monitor annual vit D level         Relevant Orders    VITAMIN D,25 HYDROXY (DEFICIENCY) (Completed)     Other Visit Diagnoses       Routine screening for STI (sexually transmitted infection)        Relevant Orders    HIV AG/AB COMBO ASSAY SCREENING (Completed)    T.PALLIDUM AB JOB (SCREENING) (Completed)    Chlamydia/GC, PCR (Urine) (Completed)    Screening for metabolic disorder        Relevant Orders    CBC WITHOUT DIFFERENTIAL (Completed)    Comp Metabolic Panel (Completed)    Lipid Profile (Completed)    HEMOGLOBIN A1C (Completed)    TSH WITH REFLEX TO FT4 (Completed)    Need for vaccination        Relevant Orders    Pneumococcal Conjugate Vaccine 20-Valent (19 yrs+) (Completed)    Tdap Vaccine =>6YO IM (Completed)    Need for hepatitis C screening test        Relevant Orders    HEP C VIRUS ANTIBODY (Completed)            Medications Prescribed Today:  1. Primary hypertension  - lisinopril (PRINIVIL) 5 MG Tab; Take 1 Tablet by mouth every day.  Dispense: 90 Tablet; Refill: 3  - hydroCHLOROthiazide (HYDRODIURIL) 12.5 MG tablet; Take 1 Tablet by mouth every day.  Dispense: 90 Tablet; Refill: 3    2. Elevated cholesterol/high density lipoprotein ratio  - atorvastatin (LIPITOR) 20 MG Tab; Take 1 Tablet by mouth every day.  Dispense: 90 Tablet; Refill: 3    3. Mild intermittent asthma without complication  - albuterol 108 (90 Base) MCG/ACT Aero Soln inhalation aerosol; Inhale 2 Puffs every four hours as needed for Shortness of Breath.  Dispense: 1 Each; Refill: 3    6. Type 2 diabetes mellitus without complication, without long-term  current use of insulin (HCC)  - Semaglutide,0.25 or 0.5MG/DOS, 2 MG/3ML Solution Pen-injector; Inject 0.5 mg under the skin every 7 days.  Dispense: 3 mL; Refill: 0    Educated in proper administration of medication(s) ordered today including safety, possible SE, risks, benefits, rationale and alternatives to therapy.       Return in about 4 weeks (around 4/28/2023) for ozempic follow up.    Please note that this dictation was created using voice recognition software. I have made every reasonable attempt to correct obvious errors, but I expect that there are errors of grammar and possibly content that I did not discover before finalizing the note.

## 2023-04-01 LAB
25(OH)D3 SERPL-MCNC: 42 NG/ML (ref 30–100)
ALBUMIN SERPL BCP-MCNC: 4.4 G/DL (ref 3.2–4.9)
ALBUMIN/GLOB SERPL: 1.3 G/DL
ALP SERPL-CCNC: 89 U/L (ref 30–99)
ALT SERPL-CCNC: 69 U/L (ref 2–50)
ANION GAP SERPL CALC-SCNC: 13 MMOL/L (ref 7–16)
AST SERPL-CCNC: 78 U/L (ref 12–45)
BILIRUB SERPL-MCNC: 0.2 MG/DL (ref 0.1–1.5)
BUN SERPL-MCNC: 7 MG/DL (ref 8–22)
C TRACH DNA SPEC QL NAA+PROBE: NEGATIVE
CALCIUM ALBUM COR SERPL-MCNC: 8.9 MG/DL (ref 8.5–10.5)
CALCIUM SERPL-MCNC: 9.2 MG/DL (ref 8.5–10.5)
CHLORIDE SERPL-SCNC: 99 MMOL/L (ref 96–112)
CHOLEST SERPL-MCNC: 164 MG/DL (ref 100–199)
CO2 SERPL-SCNC: 23 MMOL/L (ref 20–33)
CREAT SERPL-MCNC: 0.51 MG/DL (ref 0.5–1.4)
FASTING STATUS PATIENT QL REPORTED: NORMAL
GFR SERPLBLD CREATININE-BSD FMLA CKD-EPI: 117 ML/MIN/1.73 M 2
GLOBULIN SER CALC-MCNC: 3.3 G/DL (ref 1.9–3.5)
GLUCOSE SERPL-MCNC: 254 MG/DL (ref 65–99)
HCV AB SER QL: NORMAL
HDLC SERPL-MCNC: 55 MG/DL
HIV 1+2 AB+HIV1 P24 AG SERPL QL IA: NORMAL
LDLC SERPL CALC-MCNC: 93 MG/DL
N GONORRHOEA DNA SPEC QL NAA+PROBE: NEGATIVE
POTASSIUM SERPL-SCNC: 4.2 MMOL/L (ref 3.6–5.5)
PROT SERPL-MCNC: 7.7 G/DL (ref 6–8.2)
SODIUM SERPL-SCNC: 135 MMOL/L (ref 135–145)
SPECIMEN SOURCE: NORMAL
T PALLIDUM AB SER QL IA: NORMAL
TRIGL SERPL-MCNC: 79 MG/DL (ref 0–149)
TSH SERPL DL<=0.005 MIU/L-ACNC: 0.78 UIU/ML (ref 0.38–5.33)

## 2023-04-02 ASSESSMENT — ENCOUNTER SYMPTOMS
MUSCULOSKELETAL NEGATIVE: 1
GASTROINTESTINAL NEGATIVE: 1
SPUTUM PRODUCTION: 0
COUGH: 0
PALPITATIONS: 0
PSYCHIATRIC NEGATIVE: 1
SHORTNESS OF BREATH: 0
EYES NEGATIVE: 1
CONSTITUTIONAL NEGATIVE: 1
FEVER: 0
NEUROLOGICAL NEGATIVE: 1

## 2023-04-02 NOTE — ASSESSMENT & PLAN NOTE
Was not able to follow through with bariatric consult due to insurance change; her current insurance will not cover bariatric services.  She met with Jake/PharmDRE for assistance with Diabetes mgt in 2022; was put back on Trulicity & Farxiga. States farxiga gave her yeast infections. She is not seeing weight loss with trulicity and would like to switch to ozempic so we are starting her at 0.5mg for the first month as part of her diabetes mellitus type 2 mgt and for weight loss. She understands to also focus on other areas of her weight management with food choices/increasing activity level.  Has also used phentermine in the past.

## 2023-04-02 NOTE — ASSESSMENT & PLAN NOTE
Chronic; stable on as needed albuterol. Last used in 2022  States she only takes singulair when she has exacerbation of symptoms; no cough, CP, SOB reported today

## 2023-04-02 NOTE — ASSESSMENT & PLAN NOTE
Chronic; goal BP < 130/80  BP in clinic today 132/82; denies CP, palpitations, edema improved with HCTZ; she does not have home BP device;   - continue lisinopril 5mg QD, HCTZ 12.5mg QD to help with edema & BP management  - annual CMP/microalbum (due today)

## 2023-04-02 NOTE — ASSESSMENT & PLAN NOTE
Latest Reference Range & Units 03/31/23 11:37   Cholesterol,Tot 100 - 199 mg/dL 164   Triglycerides 0 - 149 mg/dL 79   HDL >=40 mg/dL 55   LDL <100 mg/dL 93   The 10-year ASCVD risk score (Erick GRAHAM, et al., 2019) is: 1.6%    Chronic; LDL goal < 100  Tolerating statin, no myalgia. On metformin, trulicity (switching to Ozempic)for diabetes mellitus type 2, lisinopril & HCTZ for hypertension   - continue Atorvastatin 20mg QHS  - monitor annual fasting lipid  - counseled on heart healthy diet, staying active with exercise

## 2023-04-02 NOTE — ASSESSMENT & PLAN NOTE
Chronic; A1C goal < 6.5  A1C 10.8 on labs today (up from 9.8 in 4/2022); she no longer wishes to see pharm for management. She would like to switch trulicity to ozempic for added weight loss; she understands critical role of diet/exercise in diabetes management   - continue Metformin 1000mg ER BID;   - d/c trulicity  - start Semaglutie IJ 0.5mg Q7days; assess tolerance in 1 month and increase to 1 mg if doing okay. We will resubmit PA as well  - recheck a1c in 3 months; may need 3rd agent if not improving

## 2023-04-02 NOTE — ASSESSMENT & PLAN NOTE
Latest Reference Range & Units 03/31/23 11:37   25-Hydroxy   Vitamin D 25 30 - 100 ng/mL 42     - continue daily MVI  - monitor annual vit D level

## 2023-04-03 LAB — RETINAL SCREEN: NEGATIVE

## 2023-05-05 ENCOUNTER — OFFICE VISIT (OUTPATIENT)
Dept: MEDICAL GROUP | Facility: PHYSICIAN GROUP | Age: 43
End: 2023-05-05
Payer: COMMERCIAL

## 2023-05-05 VITALS
RESPIRATION RATE: 16 BRPM | SYSTOLIC BLOOD PRESSURE: 120 MMHG | HEART RATE: 82 BPM | TEMPERATURE: 96.8 F | DIASTOLIC BLOOD PRESSURE: 78 MMHG | BODY MASS INDEX: 44.98 KG/M2 | OXYGEN SATURATION: 96 % | WEIGHT: 270 LBS | HEIGHT: 65 IN

## 2023-05-05 DIAGNOSIS — E11.9 TYPE 2 DIABETES MELLITUS WITHOUT COMPLICATION, WITHOUT LONG-TERM CURRENT USE OF INSULIN (HCC): ICD-10-CM

## 2023-05-05 DIAGNOSIS — E66.01 MORBID OBESITY WITH BODY MASS INDEX (BMI) OF 45.0 TO 49.9 IN ADULT (HCC): ICD-10-CM

## 2023-05-05 PROCEDURE — 99214 OFFICE O/P EST MOD 30 MIN: CPT | Performed by: NURSE PRACTITIONER

## 2023-05-05 RX ORDER — SEMAGLUTIDE 1.34 MG/ML
1 INJECTION, SOLUTION SUBCUTANEOUS
Qty: 6 ML | Refills: 1 | Status: SHIPPED | OUTPATIENT
Start: 2023-05-05 | End: 2023-07-12 | Stop reason: SDUPTHER

## 2023-05-05 ASSESSMENT — FIBROSIS 4 INDEX: FIB4 SCORE: 1.16

## 2023-05-05 NOTE — PROGRESS NOTES
Subjective       CC:   Chief Complaint   Patient presents with    Follow-Up        HPI:   Patient is a 43 y.o. established female patient with medical history listed below here today for evaluation and management of diabetes mellitus type 2; we d/juanita trulicity and switched her to Ozempic at last visit. We will assess tolerance today.    She would like referral to see bariatric surgeon.     Problem   Type 2 Diabetes Mellitus Without Complication, Without Long-Term Current Use of Insulin ([facility])    Taking Metformin 1000mg BID, switched to Ozempic 0.5mg Q7days from Trulicity 4/2/2023  Last visit with [facility]pharm 12/28/2022 (no longer following); d/juanita glipizide when she restarted Trulicity in 2022. Farxiga gave her yeast infection. Insurance did not cover Pioglitazone so she never took this.    ; Microalbum creat ratio 110  HTN managed on Lisinopril, HCTZ   Dyslipidemia managed on Atorvastatin  No neuropathy reported. Normal Monofilament today.  Retinal Screen: 4/2023, no retinopathy       Morbid Obesity With Body Mass Index (Bmi) of 45.0 to 49.9 in Adult ([facility])    Body mass index is 45.63 kg/m².           Patient Active Problem List   Diagnosis    Closed fracture of left wrist    Type 2 diabetes mellitus without complication, without long-term current use of insulin ([facility])    Mild intermittent asthma without complication    Morbid obesity with body mass index (BMI) of 45.0 to 49.9 in adult ([facility])    Elevated cholesterol/high density lipoprotein ratio    Well woman exam with routine gynecological exam    Vaginal itching    Primary hypertension    Abnormal Papanicolaou smear of cervix with positive human papilloma virus (HPV) test    Vitamin D deficiency       Past Medical History:   Diagnosis Date    Asthma     child, occasional albuterol now.     DM (diabetes mellitus) ([facility]) 02/03/2020    diet controlled    Primary hypertension 12/31/2021        Past Surgical History:   Procedure Laterality Date    PB OPEN TX  RADIAL & ULNAR SHAFT FX FIX RADIUS A*  2/4/2020    Procedure: ORIF, FRACTURE, RADIUS AND ULNA;  Surgeon: Ambrosio Lara M.D.;  Location: SURGERY [facility];  Service: General    ORIF, WRIST Left 2/4/2020    Procedure: ORIF, WRIST- DISTAL RADIUS;  Surgeon: Ambrosio Lara M.D.;  Location: SURGERY [facility];  Service: General    GYN SURGERY  2007/2010    c/s x2    TUBAL COAGULATION LAPAROSCOPIC BILATERAL          Current Outpatient Medications on File Prior to Visit   Medication Sig Dispense Refill    lisinopril (PRINIVIL) 5 MG Tab Take 1 Tablet by mouth every day. 90 Tablet 3    hydroCHLOROthiazide (HYDRODIURIL) 12.5 MG tablet Take 1 Tablet by mouth every day. 90 Tablet 3    atorvastatin (LIPITOR) 20 MG Tab Take 1 Tablet by mouth every day. 90 Tablet 3    albuterol 108 (90 Base) MCG/ACT Aero Soln inhalation aerosol Inhale 2 Puffs every four hours as needed for Shortness of Breath. 1 Each 3    metFORMIN ER (GLUCOPHAGE XR) 500 MG TABLET SR 24 HR TAKE 2 TABLETS TWICE A  Tablet 3    Multiple Vitamin (MULTIVITAMIN ADULT PO) Take  by mouth.      Lancets Lancets order: Lancets for f Insurance Preference. meter. Sig: use BID and prn ssx high or low sugar. #100 RF x 3 200 Each 0    Blood Glucose Monitoring Suppl Device Meter: Dispense Device of Insurance Preference. Sig. Use as directed for blood sugar monitoring. #1. NR. 1 Each 0    VITAMIN D-VITAMIN K PO Take 5,000 Units by mouth.      Probiotic Product (PROBIOTIC DAILY PO) Take  by mouth.       No current facility-administered medications on file prior to visit.        Health Maintenance: Completed    ROS:  Review of Systems   Constitutional: Negative.  Negative for fever and malaise/fatigue.   HENT: Negative.     Eyes: Negative.    Respiratory:  Negative for cough, sputum production and shortness of breath.    Cardiovascular:  Negative for chest pain, palpitations and leg swelling.   Gastrointestinal: Negative.    Genitourinary: Negative.   "  Musculoskeletal: Negative.    Neurological: Negative.    Endo/Heme/Allergies: Negative.    Psychiatric/Behavioral: Negative.       Objective       Exam:  /78 (BP Location: Left arm, Patient Position: Sitting, BP Cuff Size: Large adult)   Pulse 82   Temp 36 °C (96.8 °F) (Temporal)   Resp 16   Ht 1.638 m (5' 4.5\")   Wt 122 kg (270 lb)   LMP 04/27/2023 (Approximate)   SpO2 96%   BMI 45.63 kg/m²  Body mass index is 45.63 kg/m².    Physical Exam  Constitutional:       Appearance: Normal appearance. She is obese.   Cardiovascular:      Rate and Rhythm: Normal rate and regular rhythm.      Pulses: Normal pulses.      Heart sounds: Normal heart sounds.   Pulmonary:      Effort: Pulmonary effort is normal.      Breath sounds: Normal breath sounds.   Musculoskeletal:      Right lower leg: No edema.      Left lower leg: No edema.   Skin:     General: Skin is warm and dry.   Neurological:      Mental Status: She is alert.     Monofilament testing with a 10 gram force: sensation intact: intact bilaterally  Visual Inspection: Feet without maceration, ulcers, fissures.  Pedal pulses: intact bilaterally      Assessment & Plan       43 y.o. female with the following -     Problem List Items Addressed This Visit       Type 2 diabetes mellitus without complication, without long-term current use of insulin (HCC) (Chronic)     Chronic; A1C goal < 6.5  A1C 10.8 on labs 3/31/2023 (up from 9.8 in 4/2022); she no longer wishes to see pharm for management. She inquired about switching trulicity to ozempic for added weight loss benefits at our last visit; she understands critical role of diet/exercise in diabetes management; started on Ozempic 0.5mg on 4/2/20253, tolerating well; we will increase dose today.   - continue Metformin 1000mg ER BID;   - increase Semaglutie IJ to 1mg (from 0.5mg) Q7days; assess tolerance in 2 months and increase to 2 mg if doing okay.   - recheck a1c at next visit  - referral submitted today per pt " request to see bariatric surgeon         Relevant Medications    Semaglutide, 1 MG/DOSE, (OZEMPIC, 1 MG/DOSE,) 4 MG/3ML Solution Pen-injector    Other Relevant Orders    Diabetic Monofilament LE Exam (Completed)    Referral to Bariatric Surgery    Morbid obesity with body mass index (BMI) of 45.0 to 49.9 in adult ([facility]) (Chronic)     Chronic struggle with weight loss. Body mass index is 45.63 kg/m².   Was not able to follow through with prior bariatric consult due to insurance change; she is requesting new referral today to try again for bariatric options for weight loss. Hx diabetes mellitus type 2 on ozempic, metformin; hypertension on Lisinopril, HCTZ; asthma on prn albuterol. Has also used phentermine in the past.    She met with Jake/PharmD for assistance with Diabetes mgt in 2022; was put back on Trulicity & Farxiga. States farxiga gave her yeast infections. She is not seeing weight loss with trulicity and requested switch to ozempic; we started her at 0.5mg for the first month as part of her diabetes mellitus type 2 mgt and for weight loss; tolerating medication and we are increasing to 1mg dose today. She understands to also focus on other areas of her weight management with food choices/increasing activity level.             Relevant Medications    Semaglutide, 1 MG/DOSE, (OZEMPIC, 1 MG/DOSE,) 4 MG/3ML Solution Pen-injector    Other Relevant Orders    Referral to Bariatric Surgery       Medications Prescribed Today:  1. Type 2 diabetes mellitus without complication, without long-term current use of insulin ([facility])  - Semaglutide, 1 MG/DOSE, (OZEMPIC, 1 MG/DOSE,) 4 MG/3ML Solution Pen-injector; Inject 1 mg under the skin every 7 days.  Dispense: 6 mL; Refill: 1    Educated in proper administration of medication(s) ordered today including safety, possible SE, risks, benefits, rationale and alternatives to therapy.     Return in about 2 months (around 7/5/2023) for dm2 mgt.    Please note that this dictation  was created using voice recognition software. I have made every reasonable attempt to correct obvious errors, but I expect that there are errors of grammar and possibly content that I did not discover before finalizing the note.

## 2023-05-07 PROBLEM — E66.01 MORBID OBESITY WITH BODY MASS INDEX (BMI) OF 45.0 TO 49.9 IN ADULT (HCC): Chronic | Status: ACTIVE | Noted: 2020-09-01

## 2023-05-07 PROBLEM — E11.9 TYPE 2 DIABETES MELLITUS WITHOUT COMPLICATION, WITHOUT LONG-TERM CURRENT USE OF INSULIN (HCC): Chronic | Status: ACTIVE | Noted: 2020-09-01

## 2023-05-07 ASSESSMENT — ENCOUNTER SYMPTOMS
GASTROINTESTINAL NEGATIVE: 1
FEVER: 0
SPUTUM PRODUCTION: 0
PSYCHIATRIC NEGATIVE: 1
CONSTITUTIONAL NEGATIVE: 1
MUSCULOSKELETAL NEGATIVE: 1
PALPITATIONS: 0
NEUROLOGICAL NEGATIVE: 1
COUGH: 0
EYES NEGATIVE: 1
SHORTNESS OF BREATH: 0

## 2023-05-07 NOTE — ASSESSMENT & PLAN NOTE
Chronic; A1C goal < 6.5  A1C 10.8 on labs 3/31/2023 (up from 9.8 in 4/2022); she no longer wishes to see pharm for management. She inquired about switching trulicity to ozempic for added weight loss benefits at our last visit; she understands critical role of diet/exercise in diabetes management; started on Ozempic 0.5mg on 4/2/20253, tolerating well; we will increase dose today.   - continue Metformin 1000mg ER BID;   - increase Semaglutie IJ to 1mg (from 0.5mg) Q7days; assess tolerance in 2 months and increase to 2 mg if doing okay.   - recheck a1c at next visit  - referral submitted today per pt request to see bariatric surgeon

## 2023-05-07 NOTE — ASSESSMENT & PLAN NOTE
Chronic struggle with weight loss. Body mass index is 45.63 kg/m².   Was not able to follow through with prior bariatric consult due to insurance change; she is requesting new referral today to try again for bariatric options for weight loss. Hx diabetes mellitus type 2 on ozempic, metformin; hypertension on Lisinopril, HCTZ; asthma on prn albuterol. Has also used phentermine in the past.    She met with Jake/PharmD for assistance with Diabetes mgt in 2022; was put back on Trulicity & Farxiga. States farxiga gave her yeast infections. She is not seeing weight loss with trulicity and requested switch to ozempic; we started her at 0.5mg for the first month as part of her diabetes mellitus type 2 mgt and for weight loss; tolerating medication and we are increasing to 1mg dose today. She understands to also focus on other areas of her weight management with food choices/increasing activity level.

## 2023-07-12 DIAGNOSIS — E11.9 TYPE 2 DIABETES MELLITUS WITHOUT COMPLICATION, WITHOUT LONG-TERM CURRENT USE OF INSULIN (HCC): ICD-10-CM

## 2023-07-12 RX ORDER — SEMAGLUTIDE 1.34 MG/ML
1 INJECTION, SOLUTION SUBCUTANEOUS
Qty: 6 ML | Refills: 0 | Status: SHIPPED | OUTPATIENT
Start: 2023-07-12 | End: 2023-08-04

## 2023-08-04 ENCOUNTER — OFFICE VISIT (OUTPATIENT)
Dept: MEDICAL GROUP | Facility: PHYSICIAN GROUP | Age: 43
End: 2023-08-04
Payer: COMMERCIAL

## 2023-08-04 VITALS
TEMPERATURE: 96.8 F | HEIGHT: 65 IN | OXYGEN SATURATION: 97 % | DIASTOLIC BLOOD PRESSURE: 66 MMHG | RESPIRATION RATE: 16 BRPM | WEIGHT: 270 LBS | BODY MASS INDEX: 44.98 KG/M2 | HEART RATE: 76 BPM | SYSTOLIC BLOOD PRESSURE: 114 MMHG

## 2023-08-04 DIAGNOSIS — E11.9 TYPE 2 DIABETES MELLITUS WITHOUT COMPLICATION, WITHOUT LONG-TERM CURRENT USE OF INSULIN (HCC): Chronic | ICD-10-CM

## 2023-08-04 DIAGNOSIS — L98.9 SKIN LESION: ICD-10-CM

## 2023-08-04 LAB
HBA1C MFR BLD: 9.1 % (ref ?–5.8)
POCT INT CON NEG: NEGATIVE
POCT INT CON POS: POSITIVE

## 2023-08-04 PROCEDURE — 99214 OFFICE O/P EST MOD 30 MIN: CPT | Performed by: NURSE PRACTITIONER

## 2023-08-04 PROCEDURE — 83036 HEMOGLOBIN GLYCOSYLATED A1C: CPT | Performed by: NURSE PRACTITIONER

## 2023-08-04 PROCEDURE — 3078F DIAST BP <80 MM HG: CPT | Performed by: NURSE PRACTITIONER

## 2023-08-04 PROCEDURE — 3074F SYST BP LT 130 MM HG: CPT | Performed by: NURSE PRACTITIONER

## 2023-08-04 RX ORDER — GLIPIZIDE 5 MG/1
TABLET ORAL
COMMUNITY
Start: 2023-06-01

## 2023-08-04 RX ORDER — SEMAGLUTIDE 2.68 MG/ML
2 INJECTION, SOLUTION SUBCUTANEOUS
Qty: 6 ML | Refills: 1 | Status: SHIPPED | OUTPATIENT
Start: 2023-08-04 | End: 2023-10-10 | Stop reason: SDUPTHER

## 2023-08-04 ASSESSMENT — ENCOUNTER SYMPTOMS
EYES NEGATIVE: 1
FEVER: 0
COUGH: 0
CONSTITUTIONAL NEGATIVE: 1
MUSCULOSKELETAL NEGATIVE: 1
SHORTNESS OF BREATH: 0
GASTROINTESTINAL NEGATIVE: 1
NEUROLOGICAL NEGATIVE: 1
SPUTUM PRODUCTION: 0
PALPITATIONS: 0

## 2023-08-04 ASSESSMENT — FIBROSIS 4 INDEX: FIB4 SCORE: 1.16

## 2023-08-04 NOTE — PROGRESS NOTES
Subjective       CC:   Chief Complaint   Patient presents with    Diabetes Follow-up    Eczema     Dry patches, sore on knee, scabbed raised, bump on chest        HPI:   Patient is a 43 y.o. established female patient with medical history listed below here today for evaluation and management of diabetes mellitus type 2. Patient stopped taking metformin about 6 months ago. She wants to stay on only ozempic.     New concern today - raised lesion on left lower leg off/on the past year. Concerning for SCC, center is white with rough texture & irregular borders. Will send her to derm.     Problem   Type 2 Diabetes Mellitus Without Complication, Without Long-Term Current Use of Insulin ([facility])    Switched to Ozempic 0.5mg Q7days from Trulicity 4/2/2023, increased to 1mg in 5/2023. Stopped taking Metformin by choice early 2023  Last visit with [facility]pharm 12/28/2022 (no longer following); d/juanita glipizide when she restarted Trulicity in 2022. Farxiga gave her yeast infection. Insurance did not cover Pioglitazone so she never took this.    ; Microalbum creat ratio 110  HTN managed on Lisinopril, HCTZ   Dyslipidemia managed on Atorvastatin  No neuropathy reported. Normal Monofilament today.  Retinal Screen: 4/2023, no retinopathy           Patient Active Problem List   Diagnosis    Closed fracture of left wrist    Type 2 diabetes mellitus without complication, without long-term current use of insulin ([facility])    Mild intermittent asthma without complication    Morbid obesity with body mass index (BMI) of 45.0 to 49.9 in adult ([facility])    Elevated cholesterol/high density lipoprotein ratio    Well woman exam with routine gynecological exam    Vaginal itching    Primary hypertension    Abnormal Papanicolaou smear of cervix with positive human papilloma virus (HPV) test    Vitamin D deficiency       Past Medical History:   Diagnosis Date    Asthma     child, occasional albuterol now.     DM (diabetes mellitus) ([facility]) 02/03/2020     diet controlled    Primary hypertension 12/31/2021        Past Surgical History:   Procedure Laterality Date    PB OPEN TX RADIAL & ULNAR SHAFT FX FIX RADIUS A*  2/4/2020    Procedure: ORIF, FRACTURE, RADIUS AND ULNA;  Surgeon: Ambrosio Lara M.D.;  Location: SURGERY [facility];  Service: General    ORIF, WRIST Left 2/4/2020    Procedure: ORIF, WRIST- DISTAL RADIUS;  Surgeon: Ambrosio Lara M.D.;  Location: SURGERY [facility];  Service: General    GYN SURGERY  2007/2010    c/s x2    TUBAL COAGULATION LAPAROSCOPIC BILATERAL          Current Outpatient Medications on File Prior to Visit   Medication Sig Dispense Refill    glipiZIDE (GLUCOTROL) 5 MG Tab       lisinopril (PRINIVIL) 5 MG Tab Take 1 Tablet by mouth every day. 90 Tablet 3    hydroCHLOROthiazide (HYDRODIURIL) 12.5 MG tablet Take 1 Tablet by mouth every day. 90 Tablet 3    atorvastatin (LIPITOR) 20 MG Tab Take 1 Tablet by mouth every day. 90 Tablet 3    albuterol 108 (90 Base) MCG/ACT Aero Soln inhalation aerosol Inhale 2 Puffs every four hours as needed for Shortness of Breath. 1 Each 3    Multiple Vitamin (MULTIVITAMIN ADULT PO) Take  by mouth.      Lancets Lancets order: Lancets for f Insurance Preference. meter. Sig: use BID and prn ssx high or low sugar. #100 RF x 3 200 Each 0    Blood Glucose Monitoring Suppl Device Meter: Dispense Device of Insurance Preference. Sig. Use as directed for blood sugar monitoring. #1. NR. 1 Each 0    VITAMIN D-VITAMIN K PO Take 5,000 Units by mouth.      Probiotic Product (PROBIOTIC DAILY PO) Take  by mouth.       No current facility-administered medications on file prior to visit.        ROS:  Review of Systems   Constitutional: Negative.  Negative for fever and malaise/fatigue.   HENT: Negative.     Eyes: Negative.    Respiratory:  Negative for cough, sputum production and shortness of breath.    Cardiovascular:  Negative for chest pain, palpitations and leg swelling.   Gastrointestinal: Negative.   "  Genitourinary: Negative.    Musculoskeletal: Negative.    Skin:         Skin lesion left leg, mid chest   Neurological: Negative.    Endo/Heme/Allergies: Negative.        Objective       Exam:  /66   Pulse 76   Temp 36 °C (96.8 °F) (Temporal)   Resp 16   Ht 1.638 m (5' 4.5\")   Wt 122 kg (270 lb)   SpO2 97%   BMI 45.63 kg/m²  Body mass index is 45.63 kg/m².    Physical Exam  Constitutional:       Appearance: Normal appearance.   Musculoskeletal:      Right lower leg: No edema.      Left lower leg: No edema.   Skin:     General: Skin is warm and dry.      Findings: Lesion present.          Neurological:      General: No focal deficit present.      Mental Status: She is alert and oriented to person, place, and time.         Assessment & Plan       43 y.o. female with the following -     Problem List Items Addressed This Visit       Type 2 diabetes mellitus without complication, without long-term current use of insulin (HCC) (Chronic)     Chronic; A1C goal < 6.5  A1C 9.1 in clinic today (down from 10.8); she no longer wishes to see pharm for management. She inquired about switching trulicity to ozempic for added weight loss benefits at our last visit; she understands critical role of diet/exercise in diabetes management; started on Ozempic 0.5mg on 4/2/20253, tolerating well; increase to 1mg in 5/7/2023. She is doing kickboxing. She decided on her own not to take metformin any more about 6 months. She would like to continue on just Ozempic for diabetes management.   - increase Semaglutie IJ to 2mg (from 1mg) Q7days;  - recheck a1c in 3 months         Relevant Medications    glipiZIDE (GLUCOTROL) 5 MG Tab    Semaglutide, 2 MG/DOSE, (OZEMPIC, 2 MG/DOSE,) 8 MG/3ML Solution Pen-injector    Other Relevant Orders    POCT Hemoglobin A1C (Completed)     Other Visit Diagnoses       Skin lesion        Relevant Orders    Referral to Dermatology            Medications Prescribed Today:  1. Type 2 diabetes mellitus " without complication, without long-term current use of insulin (HCC)  - Semaglutide, 2 MG/DOSE, (OZEMPIC, 2 MG/DOSE,) 8 MG/3ML Solution Pen-injector; Inject 2 mg under the skin every 7 days.  Dispense: 6 mL; Refill: 1    Educated in proper administration of medication(s) ordered today including safety, possible SE, risks, benefits, rationale and alternatives to therapy.     Return in about 3 months (around 11/4/2023) for dm2.    Please note that this dictation was created using voice recognition software. I have made every reasonable attempt to correct obvious errors, but I expect that there are errors of grammar and possibly content that I did not discover before finalizing the note.

## 2023-08-04 NOTE — ASSESSMENT & PLAN NOTE
Chronic; A1C goal < 6.5  A1C 9.1 in clinic today (down from 10.8); she no longer wishes to see pharm for management. She inquired about switching trulicity to ozempic for added weight loss benefits at our last visit; she understands critical role of diet/exercise in diabetes management; started on Ozempic 0.5mg on 4/2/20253, tolerating well; increase to 1mg in 5/7/2023. She is doing kickboxing. She decided on her own not to take metformin any more about 6 months. She would like to continue on just Ozempic for diabetes management.   - increase Semaglutie IJ to 2mg (from 1mg) Q7days;  - recheck a1c in 3 months

## 2023-09-08 ENCOUNTER — APPOINTMENT (RX ONLY)
Dept: URBAN - METROPOLITAN AREA CLINIC 20 | Facility: CLINIC | Age: 43
Setting detail: DERMATOLOGY
End: 2023-09-08

## 2023-09-08 DIAGNOSIS — D485 NEOPLASM OF UNCERTAIN BEHAVIOR OF SKIN: ICD-10-CM | Status: INADEQUATELY CONTROLLED

## 2023-09-08 DIAGNOSIS — L30.8 OTHER SPECIFIED DERMATITIS: ICD-10-CM

## 2023-09-08 DIAGNOSIS — L57.0 ACTINIC KERATOSIS: ICD-10-CM

## 2023-09-08 PROBLEM — D48.5 NEOPLASM OF UNCERTAIN BEHAVIOR OF SKIN: Status: ACTIVE | Noted: 2023-09-08

## 2023-09-08 PROCEDURE — 99203 OFFICE O/P NEW LOW 30 MIN: CPT | Mod: 25

## 2023-09-08 PROCEDURE — ? BIOPSY BY SHAVE METHOD

## 2023-09-08 PROCEDURE — ? COUNSELING

## 2023-09-08 PROCEDURE — ? PRESCRIPTION

## 2023-09-08 PROCEDURE — 11102 TANGNTL BX SKIN SINGLE LES: CPT

## 2023-09-08 PROCEDURE — ? MEDICATION COUNSELING

## 2023-09-08 PROCEDURE — 17000 DESTRUCT PREMALG LESION: CPT | Mod: 59

## 2023-09-08 PROCEDURE — ? LIQUID NITROGEN

## 2023-09-08 ASSESSMENT — LOCATION DETAILED DESCRIPTION DERM
LOCATION DETAILED: LEFT RADIAL DORSAL HAND
LOCATION DETAILED: RIGHT RADIAL DORSAL HAND
LOCATION DETAILED: UPPER STERNUM
LOCATION DETAILED: LEFT PROXIMAL PRETIBIAL REGION

## 2023-09-08 ASSESSMENT — LOCATION SIMPLE DESCRIPTION DERM
LOCATION SIMPLE: LEFT HAND
LOCATION SIMPLE: RIGHT HAND
LOCATION SIMPLE: LEFT PRETIBIAL REGION
LOCATION SIMPLE: CHEST

## 2023-09-08 ASSESSMENT — LOCATION ZONE DERM
LOCATION ZONE: HAND
LOCATION ZONE: TRUNK
LOCATION ZONE: LEG

## 2023-09-08 ASSESSMENT — SEVERITY ASSESSMENT: SEVERITY: MILD TO MODERATE

## 2023-09-08 NOTE — PROCEDURE: LIQUID NITROGEN
Show Aperture Variable?: Yes
Duration Of Freeze Thaw-Cycle (Seconds): 10
Number Of Freeze-Thaw Cycles: 2 freeze-thaw cycles
Detail Level: Detailed
Post-Care Instructions: I reviewed with the patient in detail post-care instructions. Patient is to wear sunprotection, and avoid picking at any of the treated lesions. Pt may apply Vaseline to crusted or scabbing areas.
Consent: The patient's consent was obtained including but not limited to risks of crusting, scabbing, blistering, scarring, darker or lighter pigmentary change, recurrence, incomplete removal and infection. RTC in 2 months if lesion(s) persistent.
Render Note In Bullet Format When Appropriate: No

## 2023-09-08 NOTE — HPI: RASH
What Type Of Note Output Would You Prefer (Optional)?: Standard Output
How Severe Is Your Rash?: moderate
Is This A New Presentation, Or A Follow-Up?: Rash
Additional History: Pt washes her hands frequently and does not wear gloves when doing dishes/housework

## 2023-09-08 NOTE — PROCEDURE: MEDICATION COUNSELING
Nsaids Counseling: NSAID Counseling: I discussed with the patient that NSAIDs should be taken with food. Prolonged use of NSAIDs can result in the development of stomach ulcers.  Patient advised to stop taking NSAIDs if abdominal pain occurs.  The patient verbalized understanding of the proper use and possible adverse effects of NSAIDs.  All of the patient's questions and concerns were addressed.
Aklief Pregnancy And Lactation Text: It is unknown if this medication is safe to use during pregnancy.  It is unknown if this medication is excreted in breast milk.  Breastfeeding women should use the topical cream on the smallest area of the skin for the shortest time needed while breastfeeding.  Do not apply to nipple and areola.
Tranexamic Acid Pregnancy And Lactation Text: It is unknown if this medication is safe during pregnancy or breast feeding.
Protopic Pregnancy And Lactation Text: This medication is Pregnancy Category C. It is unknown if this medication is excreted in breast milk when applied topically.
Topical Metronidazole Pregnancy And Lactation Text: This medication is Pregnancy Category B and considered safe during pregnancy.  It is also considered safe to use while breastfeeding.
Albendazole Pregnancy And Lactation Text: This medication is Pregnancy Category C and it isn't known if it is safe during pregnancy. It is also excreted in breast milk.
Skyrizi Pregnancy And Lactation Text: The risk during pregnancy and breastfeeding is uncertain with this medication.
Quinolones Pregnancy And Lactation Text: This medication is Pregnancy Category C and it isn't know if it is safe during pregnancy. It is also excreted in breast milk.
Minoxidil Counseling: Minoxidil is a topical medication which can increase blood flow where it is applied. It is uncertain how this medication increases hair growth. Side effects are uncommon and include stinging and allergic reactions.
Carac Pregnancy And Lactation Text: This medication is Pregnancy Category X and contraindicated in pregnancy and in women who may become pregnant. It is unknown if this medication is excreted in breast milk.
Methotrexate Pregnancy And Lactation Text: This medication is Pregnancy Category X and is known to cause fetal harm. This medication is excreted in breast milk.
Gabapentin Pregnancy And Lactation Text: This medication is Pregnancy Category C and isn't considered safe during pregnancy. It is excreted in breast milk.
Rinvoq Pregnancy And Lactation Text: Based on animal studies, Rinvoq may cause embryo-fetal harm when administered to pregnant women.  The medication should not be used in pregnancy.  Breastfeeding is not recommended during treatment and for 6 days after the last dose.
Xolair Pregnancy And Lactation Text: This medication is Pregnancy Category B and is considered safe during pregnancy. This medication is excreted in breast milk.
Oxybutynin Counseling:  I discussed with the patient the risks of oxybutynin including but not limited to skin rash, drowsiness, dry mouth, difficulty urinating, and blurred vision.
Elidel Pregnancy And Lactation Text: This medication is Pregnancy Category C. It is unknown if this medication is excreted in breast milk.
Infliximab Counseling:  I discussed with the patient the risks of infliximab including but not limited to myelosuppression, immunosuppression, autoimmune hepatitis, demyelinating diseases, lymphoma, and serious infections.  The patient understands that monitoring is required including a PPD at baseline and must alert us or the primary physician if symptoms of infection or other concerning signs are noted.
Dutasteride Male Counseling: Dustasteride Counseling:  I discussed with the patient the risks of use of dutasteride including but not limited to decreased libido, decreased ejaculate volume, and gynecomastia. Women who can become pregnant should not handle medication.  All of the patient's questions and concerns were addressed.
VTAMA Counseling: I discussed with the patient that VTAMA is not for use in the eyes, mouth or mouth. They should call the office if they develop any signs of allergic reactions to VTAMA. The patient verbalized understanding of the proper use and possible adverse effects of VTAMA.  All of the patient's questions and concerns were addressed.
Arava Counseling:  Patient counseled regarding adverse effects of Arava including but not limited to nausea, vomiting, abnormalities in liver function tests. Patients may develop mouth sores, rash, diarrhea, and abnormalities in blood counts. The patient understands that monitoring is required including LFTs and blood counts.  There is a rare possibility of scarring of the liver and lung problems that can occur when taking methotrexate. Persistent nausea, loss of appetite, pale stools, dark urine, cough, and shortness of breath should be reported immediately. Patient advised to discontinue Arava treatment and consult with a physician prior to attempting conception. The patient will have to undergo a treatment to eliminate Arava from the body prior to conception.
Azathioprine Counseling:  I discussed with the patient the risks of azathioprine including but not limited to myelosuppression, immunosuppression, hepatotoxicity, lymphoma, and infections.  The patient understands that monitoring is required including baseline LFTs, Creatinine, possible TPMP genotyping and weekly CBCs for the first month and then every 2 weeks thereafter.  The patient verbalized understanding of the proper use and possible adverse effects of azathioprine.  All of the patient's questions and concerns were addressed.
Terbinafine Pregnancy And Lactation Text: This medication is Pregnancy Category B and is considered safe during pregnancy. It is also excreted in breast milk and breast feeding isn't recommended.
Soolantra Pregnancy And Lactation Text: This medication is Pregnancy Category C. This medication is considered safe during breast feeding.
Doxycycline Counseling:  Patient counseled regarding possible photosensitivity and increased risk for sunburn.  Patient instructed to avoid sunlight, if possible.  When exposed to sunlight, patients should wear protective clothing, sunglasses, and sunscreen.  The patient was instructed to call the office immediately if the following severe adverse effects occur:  hearing changes, easy bruising/bleeding, severe headache, or vision changes.  The patient verbalized understanding of the proper use and possible adverse effects of doxycycline.  All of the patient's questions and concerns were addressed.
Dupixent Counseling: I discussed with the patient the risks of dupilumab including but not limited to eye infection and irritation, cold sores, injection site reactions, worsening of asthma, allergic reactions and increased risk of parasitic infection.  Live vaccines should be avoided while taking dupilumab. Dupilumab will also interact with certain medications such as warfarin and cyclosporine. The patient understands that monitoring is required and they must alert us or the primary physician if symptoms of infection or other concerning signs are noted.
Protopic Counseling: Patient may experience a mild burning sensation during topical application. Protopic is not approved in children less than 2 years of age. There have been case reports of hematologic and skin malignancies in patients using topical calcineurin inhibitors although causality is questionable.
Niacinamide Pregnancy And Lactation Text: These medications are considered safe during pregnancy.
Detail Level: Zone
Aklief counseling:  Patient advised to apply a pea-sized amount only at bedtime and wait 30 minutes after washing their face before applying.  If too drying, patient may add a non-comedogenic moisturizer.  The most commonly reported side effects including irritation, redness, scaling, dryness, stinging, burning, itching, and increased risk of sunburn.  The patient verbalized understanding of the proper use and possible adverse effects of retinoids.  All of the patient's questions and concerns were addressed.
Tranexamic Acid Counseling:  Patient advised of the small risk of bleeding problems with tranexamic acid. They were also instructed to call if they developed any nausea, vomiting or diarrhea. All of the patient's questions and concerns were addressed.
Albendazole Counseling:  I discussed with the patient the risks of albendazole including but not limited to cytopenia, kidney damage, nausea/vomiting and severe allergy.  The patient understands that this medication is being used in an off-label manner.
Gabapentin Counseling: I discussed with the patient the risks of gabapentin including but not limited to dizziness, somnolence, fatigue and ataxia.
Skyrizi Counseling: I discussed with the patient the risks of risankizumab-rzaa including but not limited to immunosuppression, and serious infections.  The patient understands that monitoring is required including a PPD at baseline and must alert us or the primary physician if symptoms of infection or other concerning signs are noted.
Klisyri Pregnancy And Lactation Text: It is unknown if this medication can harm a developing fetus or if it is excreted in breast milk.
Carac Counseling:  I discussed with the patient the risks of Carac including but not limited to erythema, scaling, itching, weeping, crusting, and pain.
Topical Steroids Counseling: I discussed with the patient that prolonged use of topical steroids can result in the increased appearance of superficial blood vessels (telangiectasias), lightening (hypopigmentation) and thinning of the skin (atrophy).  Patient understands to avoid using high potency steroids in skin folds, the groin or the face.  The patient verbalized understanding of the proper use and possible adverse effects of topical steroids.  All of the patient's questions and concerns were addressed.
Methotrexate Counseling:  Patient counseled regarding adverse effects of methotrexate including but not limited to nausea, vomiting, abnormalities in liver function tests. Patients may develop mouth sores, rash, diarrhea, and abnormalities in blood counts. The patient understands that monitoring is required including LFT's and blood counts.  There is a rare possibility of scarring of the liver and lung problems that can occur when taking methotrexate. Persistent nausea, loss of appetite, pale stools, dark urine, cough, and shortness of breath should be reported immediately. Patient advised to discontinue methotrexate treatment at least three months before attempting to become pregnant.  I discussed the need for folate supplements while taking methotrexate.  These supplements can decrease side effects during methotrexate treatment. The patient verbalized understanding of the proper use and possible adverse effects of methotrexate.  All of the patient's questions and concerns were addressed.
Xolair Counseling:  Patient informed of potential adverse effects including but not limited to fever, muscle aches, rash and allergic reactions.  The patient verbalized understanding of the proper use and possible adverse effects of Xolair.  All of the patient's questions and concerns were addressed.
Fluconazole Counseling:  Patient counseled regarding adverse effects of fluconazole including but not limited to headache, diarrhea, nausea, upset stomach, liver function test abnormalities, taste disturbance, and stomach pain.  There is a rare possibility of liver failure that can occur when taking fluconazole.  The patient understands that monitoring of LFTs and kidney function test may be required, especially at baseline. The patient verbalized understanding of the proper use and possible adverse effects of fluconazole.  All of the patient's questions and concerns were addressed.
Quinolones Counseling:  I discussed with the patient the risks of fluoroquinolones including but not limited to GI upset, allergic reaction, drug rash, diarrhea, dizziness, photosensitivity, yeast infections, liver function test abnormalities, tendonitis/tendon rupture.
Azithromycin Pregnancy And Lactation Text: This medication is considered safe during pregnancy and is also secreted in breast milk.
Rinvoq Counseling: I discussed with the patient the risks of Rinvoq therapy including but not limited to upper respiratory tract infections, shingles, cold sores, bronchitis, nausea, cough, fever, acne, and headache. Live vaccines should be avoided.  This medication has been linked to serious infections; higher rate of mortality; malignancy and lymphoproliferative disorders; major adverse cardiovascular events; thrombosis; thrombocytopenia, anemia, and neutropenia; lipid elevations; liver enzyme elevations; and gastrointestinal perforations.
Vtama Pregnancy And Lactation Text: It is unknown if this medication can cause problems during pregnancy and breastfeeding.
Otezla Pregnancy And Lactation Text: This medication is Pregnancy Category C and it isn't known if it is safe during pregnancy. It is unknown if it is excreted in breast milk.
Soolantra Counseling: I discussed with the patients the risks of topial Soolantra. This is a medicine which decreases the number of mites and inflammation in the skin. You experience burning, stinging, eye irritation or allergic reactions.  Please call our office if you develop any problems from using this medication.
Clindamycin Pregnancy And Lactation Text: This medication can be used in pregnancy if certain situations. Clindamycin is also present in breast milk.
Infliximab Pregnancy And Lactation Text: This medication is Pregnancy Category B and is considered safe during pregnancy. It is unknown if this medication is excreted in breast milk.
Bexarotene Counseling:  I discussed with the patient the risks of bexarotene including but not limited to hair loss, dry lips/skin/eyes, liver abnormalities, hyperlipidemia, pancreatitis, depression/suicidal ideation, photosensitivity, drug rash/allergic reactions, hypothyroidism, anemia, leukopenia, infection, cataracts, and teratogenicity.  Patient understands that they will need regular blood tests to check lipid profile, liver function tests, white blood cell count, thyroid function tests and pregnancy test if applicable.
Elidel Counseling: Patient may experience a mild burning sensation during topical application. Elidel is not approved in children less than 2 years of age. There have been case reports of hematologic and skin malignancies in patients using topical calcineurin inhibitors although causality is questionable.
Niacinamide Counseling: I recommended taking niacin or niacinamide, also know as vitamin B3, twice daily. Recent evidence suggests that taking vitamin B3 (500 mg twice daily) can reduce the risk of actinic keratoses and non-melanoma skin cancers. Side effects of vitamin B3 include flushing and headache.
Terbinafine Counseling: Patient counseling regarding adverse effects of terbinafine including but not limited to headache, diarrhea, rash, upset stomach, liver function test abnormalities, itching, taste/smell disturbance, nausea, abdominal pain, and flatulence.  There is a rare possibility of liver failure that can occur when taking terbinafine.  The patient understands that a baseline LFT and kidney function test may be required. The patient verbalized understanding of the proper use and possible adverse effects of terbinafine.  All of the patient's questions and concerns were addressed.
Dupixent Pregnancy And Lactation Text: This medication likely crosses the placenta but the risk for the fetus is uncertain. This medication is excreted in breast milk.
Thalidomide Pregnancy And Lactation Text: This medication is Pregnancy Category X and is absolutely contraindicated during pregnancy. It is unknown if it is excreted in breast milk.
Topical Steroids Applications Pregnancy And Lactation Text: Most topical steroids are considered safe to use during pregnancy and lactation.  Any topical steroid applied to the breast or nipple should be washed off before breastfeeding.
Spironolactone Pregnancy And Lactation Text: This medication can cause feminization of the male fetus and should be avoided during pregnancy. The active metabolite is also found in breast milk.
Minocycline Pregnancy And Lactation Text: This medication is Pregnancy Category D and not consider safe during pregnancy. It is also excreted in breast milk.
Cyclosporine Pregnancy And Lactation Text: This medication is Pregnancy Category C and it isn't know if it is safe during pregnancy. This medication is excreted in breast milk.
Olumiant Pregnancy And Lactation Text: Based on animal studies, Olumiant may cause embryo-fetal harm when administered to pregnant women.  The medication should not be used in pregnancy.  Breastfeeding is not recommended during treatment.
Klisyri Counseling:  I discussed with the patient the risks of Klisyri including but not limited to erythema, scaling, itching, weeping, crusting, and pain.
Benzoyl Peroxide Pregnancy And Lactation Text: This medication is Pregnancy Category C. It is unknown if benzoyl peroxide is excreted in breast milk.
Dapsone Pregnancy And Lactation Text: This medication is Pregnancy Category C and is not considered safe during pregnancy or breast feeding.
Otezla Counseling: The side effects of Otezla were discussed with the patient, including but not limited to worsening or new depression, weight loss, diarrhea, nausea, upper respiratory tract infection, and headache. Patient instructed to call the office should any adverse effect occur.  The patient verbalized understanding of the proper use and possible adverse effects of Otezla.  All the patient's questions and concerns were addressed.
Acitretin Pregnancy And Lactation Text: This medication is Pregnancy Category X and should not be given to women who are pregnant or may become pregnant in the future. This medication is excreted in breast milk.
Azithromycin Counseling:  I discussed with the patient the risks of azithromycin including but not limited to GI upset, allergic reaction, drug rash, diarrhea, and yeast infections.
Drysol Pregnancy And Lactation Text: This medication is considered safe during pregnancy and breast feeding.
Zoryve Counseling:  I discussed with the patient that Zoryve is not for use in the eyes, mouth or vagina. The most commonly reported side effects include diarrhea, headache, insomnia, application site pain, upper respiratory tract infections, and urinary tract infections.  All of the patient's questions and concerns were addressed.
Solaraze Pregnancy And Lactation Text: This medication is Pregnancy Category B and is considered safe. There is some data to suggest avoiding during the third trimester. It is unknown if this medication is excreted in breast milk.
Ketoconazole Pregnancy And Lactation Text: This medication is Pregnancy Category C and it isn't know if it is safe during pregnancy. It is also excreted in breast milk and breast feeding isn't recommended.
Clindamycin Counseling: I counseled the patient regarding use of clindamycin as an antibiotic for prophylactic and/or therapeutic purposes. Clindamycin is active against numerous classes of bacteria, including skin bacteria. Side effects may include nausea, diarrhea, gastrointestinal upset, rash, hives, yeast infections, and in rare cases, colitis.
Rituxan Counseling:  I discussed with the patient the risks of Rituxan infusions. Side effects can include infusion reactions, severe drug rashes including mucocutaneous reactions, reactivation of latent hepatitis and other infections and rarely progressive multifocal leukoencephalopathy.  All of the patient's questions and concerns were addressed.
Low Dose Naltrexone Pregnancy And Lactation Text: Naltrexone is pregnancy category C.  There have been no adequate and well-controlled studies in pregnant women.  It should be used in pregnancy only if the potential benefit justifies the potential risk to the fetus.   Limited data indicates that naltrexone is minimally excreted into breastmilk.
Hydroxyzine Pregnancy And Lactation Text: This medication is not safe during pregnancy and should not be taken. It is also excreted in breast milk and breast feeding isn't recommended.
Picato Counseling:  I discussed with the patient the risks of Picato including but not limited to erythema, scaling, itching, weeping, crusting, and pain.
Enbrel Counseling:  I discussed with the patient the risks of etanercept including but not limited to myelosuppression, immunosuppression, autoimmune hepatitis, demyelinating diseases, lymphoma, and infections.  The patient understands that monitoring is required including a PPD at baseline and must alert us or the primary physician if symptoms of infection or other concerning signs are noted.
Odomzo Counseling- I discussed with the patient the risks of Odomzo including but not limited to nausea, vomiting, diarrhea, constipation, weight loss, changes in the sense of taste, decreased appetite, muscle spasms, and hair loss.  The patient verbalized understanding of the proper use and possible adverse effects of Odomzo.  All of the patient's questions and concerns were addressed.
Dapsone Counseling: I discussed with the patient the risks of dapsone including but not limited to hemolytic anemia, agranulocytosis, rashes, methemoglobinemia, kidney failure, peripheral neuropathy, headaches, GI upset, and liver toxicity.  Patients who start dapsone require monitoring including baseline LFTs and weekly CBCs for the first month, then every month thereafter.  The patient verbalized understanding of the proper use and possible adverse effects of dapsone.  All of the patient's questions and concerns were addressed.
Thalidomide Counseling: I discussed with the patient the risks of thalidomide including but not limited to birth defects, anxiety, weakness, chest pain, dizziness, cough and severe allergy.
Benzoyl Peroxide Counseling: Patient counseled that medicine may cause skin irritation and bleach clothing.  In the event of skin irritation, the patient was advised to reduce the amount of the drug applied or use it less frequently.   The patient verbalized understanding of the proper use and possible adverse effects of benzoyl peroxide.  All of the patient's questions and concerns were addressed.
Topical Sulfur Applications Counseling: Topical Sulfur Counseling: Patient counseled that this medication may cause skin irritation or allergic reactions.  In the event of skin irritation, the patient was advised to reduce the amount of the drug applied or use it less frequently.   The patient verbalized understanding of the proper use and possible adverse effects of topical sulfur application.  All of the patient's questions and concerns were addressed.
Spironolactone Counseling: Patient advised regarding risks of diarrhea, abdominal pain, hyperkalemia, birth defects (for female patients), liver toxicity and renal toxicity. The patient may need blood work to monitor liver and kidney function and potassium levels while on therapy. The patient verbalized understanding of the proper use and possible adverse effects of spironolactone.  All of the patient's questions and concerns were addressed.
Cyclosporine Counseling:  I discussed with the patient the risks of cyclosporine including but not limited to hypertension, gingival hyperplasia,myelosuppression, immunosuppression, liver damage, kidney damage, neurotoxicity, lymphoma, and serious infections. The patient understands that monitoring is required including baseline blood pressure, CBC, CMP, lipid panel and uric acid, and then 1-2 times monthly CMP and blood pressure.
Simponi Counseling:  I discussed with the patient the risks of golimumab including but not limited to myelosuppression, immunosuppression, autoimmune hepatitis, demyelinating diseases, lymphoma, and serious infections.  The patient understands that monitoring is required including a PPD at baseline and must alert us or the primary physician if symptoms of infection or other concerning signs are noted.
Minocycline Counseling: Patient advised regarding possible photosensitivity and discoloration of the teeth, skin, lips, tongue and gums.  Patient instructed to avoid sunlight, if possible.  When exposed to sunlight, patients should wear protective clothing, sunglasses, and sunscreen.  The patient was instructed to call the office immediately if the following severe adverse effects occur:  hearing changes, easy bruising/bleeding, severe headache, or vision changes.  The patient verbalized understanding of the proper use and possible adverse effects of minocycline.  All of the patient's questions and concerns were addressed.
Olumiant Counseling: I discussed with the patient the risks of Olumiant therapy including but not limited to upper respiratory tract infections, shingles, cold sores, and nausea. Live vaccines should be avoided.  This medication has been linked to serious infections; higher rate of mortality; malignancy and lymphoproliferative disorders; major adverse cardiovascular events; thrombosis; gastrointestinal perforations; neutropenia; lymphopenia; anemia; liver enzyme elevations; and lipid elevations.
Tremfya Counseling: I discussed with the patient the risks of guselkumab including but not limited to immunosuppression, serious infections, worsening of inflammatory bowel disease and drug reactions.  The patient understands that monitoring is required including a PPD at baseline and must alert us or the primary physician if symptoms of infection or other concerning signs are noted.
Solaraze Counseling:  I discussed with the patient the risks of Solaraze including but not limited to erythema, scaling, itching, weeping, crusting, and pain.
Acitretin Counseling:  I discussed with the patient the risks of acitretin including but not limited to hair loss, dry lips/skin/eyes, liver damage, hyperlipidemia, depression/suicidal ideation, photosensitivity.  Serious rare side effects can include but are not limited to pancreatitis, pseudotumor cerebri, bony changes, clot formation/stroke/heart attack.  Patient understands that alcohol is contraindicated since it can result in liver toxicity and significantly prolong the elimination of the drug by many years.
Oral Minoxidil Pregnancy And Lactation Text: This medication should only be used when clearly needed if you are pregnant, attempting to become pregnant or breast feeding.
Drysol Counseling:  I discussed with the patient the risks of drysol/aluminum chloride including but not limited to skin rash, itching, irritation, burning.
Cephalexin Pregnancy And Lactation Text: This medication is Pregnancy Category B and considered safe during pregnancy.  It is also excreted in breast milk but can be used safely for shorter doses.
Low Dose Naltrexone Counseling- I discussed with the patient the potential risks and side effects of low dose naltrexone including but not limited to: more vivid dreams, headaches, nausea, vomiting, abdominal pain, fatigue, dizziness, and anxiety.
Ketoconazole Counseling:   Patient counseled regarding improving absorption with orange juice.  Adverse effects include but are not limited to breast enlargement, headache, diarrhea, nausea, upset stomach, liver function test abnormalities, taste disturbance, and stomach pain.  There is a rare possibility of liver failure that can occur when taking ketoconazole. The patient understands that monitoring of LFTs may be required, especially at baseline. The patient verbalized understanding of the proper use and possible adverse effects of ketoconazole.  All of the patient's questions and concerns were addressed.
Opzelura Pregnancy And Lactation Text: There is insufficient data to evaluate drug-associated risk for major birth defects, miscarriage, or other adverse maternal or fetal outcomes.  There is a pregnancy registry that monitors pregnancy outcomes in pregnant persons exposed to the medication during pregnancy.  It is unknown if this medication is excreted in breast milk.  Do not breastfeed during treatment and for about 4 weeks after the last dose.
Rituxan Pregnancy And Lactation Text: This medication is Pregnancy Category C and it isn't know if it is safe during pregnancy. It is unknown if this medication is excreted in breast milk but similar antibodies are known to be excreted.
Topical Clindamycin Counseling: Patient counseled that this medication may cause skin irritation or allergic reactions.  In the event of skin irritation, the patient was advised to reduce the amount of the drug applied or use it less frequently.   The patient verbalized understanding of the proper use and possible adverse effects of clindamycin.  All of the patient's questions and concerns were addressed.
Hydroxyzine Counseling: Patient advised that the medication is sedating and not to drive a car after taking this medication.  Patient informed of potential adverse effects including but not limited to dry mouth, urinary retention, and blurry vision.  The patient verbalized understanding of the proper use and possible adverse effects of hydroxyzine.  All of the patient's questions and concerns were addressed.
Sski Pregnancy And Lactation Text: This medication is Pregnancy Category D and isn't considered safe during pregnancy. It is excreted in breast milk.
Tetracycline Counseling: Patient counseled regarding possible photosensitivity and increased risk for sunburn.  Patient instructed to avoid sunlight, if possible.  When exposed to sunlight, patients should wear protective clothing, sunglasses, and sunscreen.  The patient was instructed to call the office immediately if the following severe adverse effects occur:  hearing changes, easy bruising/bleeding, severe headache, or vision changes.  The patient verbalized understanding of the proper use and possible adverse effects of tetracycline.  All of the patient's questions and concerns were addressed. Patient understands to avoid pregnancy while on therapy due to potential birth defects.
Adbry Pregnancy And Lactation Text: It is unknown if this medication will adversely affect pregnancy or breast feeding.
Birth Control Pills Pregnancy And Lactation Text: This medication should be avoided if pregnant and for the first 30 days post-partum.
Libtayo Pregnancy And Lactation Text: This medication is contraindicated in pregnancy and when breast feeding.
Topical Sulfur Applications Pregnancy And Lactation Text: This medication is Pregnancy Category C and has an unknown safety profile during pregnancy. It is unknown if this topical medication is excreted in breast milk.
High Dose Vitamin A Pregnancy And Lactation Text: High dose vitamin A therapy is contraindicated during pregnancy and breast feeding.
Imiquimod Counseling:  I discussed with the patient the risks of imiquimod including but not limited to erythema, scaling, itching, weeping, crusting, and pain.  Patient understands that the inflammatory response to imiquimod is variable from person to person and was educated regarded proper titration schedule.  If flu-like symptoms develop, patient knows to discontinue the medication and contact us.
Metronidazole Pregnancy And Lactation Text: This medication is Pregnancy Category B and considered safe during pregnancy.  It is also excreted in breast milk.
Cyclophosphamide Pregnancy And Lactation Text: This medication is Pregnancy Category D and it isn't considered safe during pregnancy. This medication is excreted in breast milk.
Cibinqo Pregnancy And Lactation Text: It is unknown if this medication will adversely affect pregnancy or breast feeding.  You should not take this medication if you are currently pregnant or planning a pregnancy or while breastfeeding.
Oral Minoxidil Counseling- I discussed with the patient the risks of oral minoxidil including but not limited to shortness of breath, swelling of the feet or ankles, dizziness, lightheadedness, unwanted hair growth and allergic reaction.  The patient verbalized understanding of the proper use and possible adverse effects of oral minoxidil.  All of the patient's questions and concerns were addressed.
Zyclara Counseling:  I discussed with the patient the risks of imiquimod including but not limited to erythema, scaling, itching, weeping, crusting, and pain.  Patient understands that the inflammatory response to imiquimod is variable from person to person and was educated regarded proper titration schedule.  If flu-like symptoms develop, patient knows to discontinue the medication and contact us.
Rhofade Pregnancy And Lactation Text: This medication has not been assigned a Pregnancy Risk Category. It is unknown if the medication is excreted in breast milk.
Topical Clindamycin Pregnancy And Lactation Text: This medication is Pregnancy Category B and is considered safe during pregnancy. It is unknown if it is excreted in breast milk.
Cephalexin Counseling: I counseled the patient regarding use of cephalexin as an antibiotic for prophylactic and/or therapeutic purposes. Cephalexin (commonly prescribed under brand name Keflex) is a cephalosporin antibiotic which is active against numerous classes of bacteria, including most skin bacteria. Side effects may include nausea, diarrhea, gastrointestinal upset, rash, hives, yeast infections, and in rare cases, hepatitis, kidney disease, seizures, fever, confusion, neurologic symptoms, and others. Patients with severe allergies to penicillin medications are cautioned that there is about a 10% incidence of cross-reactivity with cephalosporins. When possible, patients with penicillin allergies should use alternatives to cephalosporins for antibiotic therapy.
Hydroxychloroquine Pregnancy And Lactation Text: This medication has been shown to cause fetal harm but it isn't assigned a Pregnancy Risk Category. There are small amounts excreted in breast milk.
Doxepin Pregnancy And Lactation Text: This medication is Pregnancy Category C and it isn't known if it is safe during pregnancy. It is also excreted in breast milk and breast feeding isn't recommended.
Opzelura Counseling:  I discussed with the patient the risks of Opzelura including but not limited to nasopharngitis, bronchitis, ear infection, eosinophila, hives, diarrhea, folliculitis, tonsillitis, and rhinorrhea.  Taken orally, this medication has been linked to serious infections; higher rate of mortality; malignancy and lymphoproliferative disorders; major adverse cardiovascular events; thrombosis; thrombocytopenia, anemia, and neutropenia; and lipid elevations.
Humira Counseling:  I discussed with the patient the risks of adalimumab including but not limited to myelosuppression, immunosuppression, autoimmune hepatitis, demyelinating diseases, lymphoma, and serious infections.  The patient understands that monitoring is required including a PPD at baseline and must alert us or the primary physician if symptoms of infection or other concerning signs are noted.
Siliq Counseling:  I discussed with the patient the risks of Siliq including but not limited to new or worsening depression, suicidal thoughts and behavior, immunosuppression, malignancy, posterior leukoencephalopathy syndrome, and serious infections.  The patient understands that monitoring is required including a PPD at baseline and must alert us or the primary physician if symptoms of infection or other concerning signs are noted. There is also a special program designed to monitor depression which is required with Siliq.
Adbry Counseling: I discussed with the patient the risks of tralokinumab including but not limited to eye infection and irritation, cold sores, injection site reactions, worsening of asthma, allergic reactions and increased risk of parasitic infection.  Live vaccines should be avoided while taking tralokinumab. The patient understands that monitoring is required and they must alert us or the primary physician if symptoms of infection or other concerning signs are noted.
Colchicine Counseling:  Patient counseled regarding adverse effects including but not limited to stomach upset (nausea, vomiting, stomach pain, or diarrhea).  Patient instructed to limit alcohol consumption while taking this medication.  Colchicine may reduce blood counts especially with prolonged use.  The patient understands that monitoring of kidney function and blood counts may be required, especially at baseline. The patient verbalized understanding of the proper use and possible adverse effects of colchicine.  All of the patient's questions and concerns were addressed.
Xelvahez Pregnancy And Lactation Text: This medication is Pregnancy Category D and is not considered safe during pregnancy.  The risk during breast feeding is also uncertain.
Cimzia Counseling:  I discussed with the patient the risks of Cimzia including but not limited to immunosuppression, allergic reactions and infections.  The patient understands that monitoring is required including a PPD at baseline and must alert us or the primary physician if symptoms of infection or other concerning signs are noted.
SSKI Counseling:  I discussed with the patient the risks of SSKI including but not limited to thyroid abnormalities, metallic taste, GI upset, fever, headache, acne, arthralgias, paraesthesias, lymphadenopathy, easy bleeding, arrhythmias, and allergic reaction.
Cyclophosphamide Counseling:  I discussed with the patient the risks of cyclophosphamide including but not limited to hair loss, hormonal abnormalities, decreased fertility, abdominal pain, diarrhea, nausea and vomiting, bone marrow suppression and infection. The patient understands that monitoring is required while taking this medication.
Hydroquinone Pregnancy And Lactation Text: This medication has not been assigned a Pregnancy Risk Category but animal studies failed to show danger with the topical medication. It is unknown if the medication is excreted in breast milk.
Wartpeel Counseling:  I discussed with the patient the risks of Wartpeel including but not limited to erythema, scaling, itching, weeping, crusting, and pain.
High Dose Vitamin A Counseling: Side effects reviewed, pt to contact office should one occur.
Birth Control Pills Counseling: Birth Control Pill Counseling: I discussed with the patient the potential side effects of OCPs including but not limited to increased risk of stroke, heart attack, thrombophlebitis, deep venous thrombosis, hepatic adenomas, breast changes, GI upset, headaches, and depression.  The patient verbalized understanding of the proper use and possible adverse effects of OCPs. All of the patient's questions and concerns were addressed.
Tazorac Pregnancy And Lactation Text: This medication is not safe during pregnancy. It is unknown if this medication is excreted in breast milk.
Libtayo Counseling- I discussed with the patient the risks of Libtayo including but not limited to nausea, vomiting, diarrhea, and bone or muscle pain.  The patient verbalized understanding of the proper use and possible adverse effects of Libtayo.  All of the patient's questions and concerns were addressed.
Metronidazole Counseling:  I discussed with the patient the risks of metronidazole including but not limited to seizures, nausea/vomiting, a metallic taste in the mouth, nausea/vomiting and severe allergy.
Cibinqo Counseling: I discussed with the patient the risks of Cibinqo therapy including but not limited to common cold, nausea, headache, cold sores, increased blood CPK levels, dizziness, UTIs, fatigue, acne, and vomitting. Live vaccines should be avoided.  This medication has been linked to serious infections; higher rate of mortality; malignancy and lymphoproliferative disorders; major adverse cardiovascular events; thrombosis; thrombocytopenia and lymphopenia; lipid elevations; and retinal detachment.
Taltz Counseling: I discussed with the patient the risks of ixekizumab including but not limited to immunosuppression, serious infections, worsening of inflammatory bowel disease and drug reactions.  The patient understands that monitoring is required including a PPD at baseline and must alert us or the primary physician if symptoms of infection or other concerning signs are noted.
Olanzapine Pregnancy And Lactation Text: This medication is pregnancy category C.   There are no adequate and well controlled trials with olanzapine in pregnant females.  Olanzapine should be used during pregnancy only if the potential benefit justifies the potential risk to the fetus.   In a study in lactating healthy women, olanzapine was excreted in breast milk.  It is recommended that women taking olanzapine should not breast feed.
Rhofade Counseling: Rhofade is a topical medication which can decrease superficial blood flow where applied. Side effects are uncommon and include stinging, redness and allergic reactions.
5-Fu Counseling: 5-Fluorouracil Counseling:  I discussed with the patient the risks of 5-fluorouracil including but not limited to erythema, scaling, itching, weeping, crusting, and pain.
Use Enhanced Medication Counseling?: No
Itraconazole Counseling:  I discussed with the patient the risks of itraconazole including but not limited to liver damage, nausea/vomiting, neuropathy, and severe allergy.  The patient understands that this medication is best absorbed when taken with acidic beverages such as non-diet cola or ginger ale.  The patient understands that monitoring is required including baseline LFTs and repeat LFTs at intervals.  The patient understands that they are to contact us or the primary physician if concerning signs are noted.
Bactrim Pregnancy And Lactation Text: This medication is Pregnancy Category D and is known to cause fetal risk.  It is also excreted in breast milk.
Topical Ketoconazole Counseling: Patient counseled that this medication may cause skin irritation or allergic reactions.  In the event of skin irritation, the patient was advised to reduce the amount of the drug applied or use it less frequently.   The patient verbalized understanding of the proper use and possible adverse effects of ketoconazole.  All of the patient's questions and concerns were addressed.
Hydroxychloroquine Counseling:  I discussed with the patient that a baseline ophthalmologic exam is needed at the start of therapy and every year thereafter while on therapy. A CBC may also be warranted for monitoring.  The side effects of this medication were discussed with the patient, including but not limited to agranulocytosis, aplastic anemia, seizures, rashes, retinopathy, and liver toxicity. Patient instructed to call the office should any adverse effect occur.  The patient verbalized understanding of the proper use and possible adverse effects of Plaquenil.  All the patient's questions and concerns were addressed.
Doxepin Counseling:  Patient advised that the medication is sedating and not to drive a car after taking this medication. Patient informed of potential adverse effects including but not limited to dry mouth, urinary retention, and blurry vision.  The patient verbalized understanding of the proper use and possible adverse effects of doxepin.  All of the patient's questions and concerns were addressed.
Sarecycline Counseling: Patient advised regarding possible photosensitivity and discoloration of the teeth, skin, lips, tongue and gums.  Patient instructed to avoid sunlight, if possible.  When exposed to sunlight, patients should wear protective clothing, sunglasses, and sunscreen.  The patient was instructed to call the office immediately if the following severe adverse effects occur:  hearing changes, easy bruising/bleeding, severe headache, or vision changes.  The patient verbalized understanding of the proper use and possible adverse effects of sarecycline.  All of the patient's questions and concerns were addressed.
Propranolol Pregnancy And Lactation Text: This medication is Pregnancy Category C and it isn't known if it is safe during pregnancy. It is excreted in breast milk.
Isotretinoin Pregnancy And Lactation Text: This medication is Pregnancy Category X and is considered extremely dangerous during pregnancy. It is unknown if it is excreted in breast milk.
Finasteride Pregnancy And Lactation Text: This medication is absolutely contraindicated during pregnancy. It is unknown if it is excreted in breast milk.
Xeljanz Counseling: I discussed with the patient the risks of Xeljanz therapy including increased risk of infection, liver issues, headache, diarrhea, or cold symptoms. Live vaccines should be avoided. They were instructed to call if they have any problems.
Cantharidin Pregnancy And Lactation Text: This medication has not been proven safe during pregnancy. It is unknown if this medication is excreted in breast milk.
Tazorac Counseling:  Patient advised that medication is irritating and drying.  Patient may need to apply sparingly and wash off after an hour before eventually leaving it on overnight.  The patient verbalized understanding of the proper use and possible adverse effects of tazorac.  All of the patient's questions and concerns were addressed.
Hydroquinone Counseling:  Patient advised that medication may result in skin irritation, lightening (hypopigmentation), dryness, and burning.  In the event of skin irritation, the patient was advised to reduce the amount of the drug applied or use it less frequently.  Rarely, spots that are treated with hydroquinone can become darker (pseudoochronosis).  Should this occur, patient instructed to stop medication and call the office. The patient verbalized understanding of the proper use and possible adverse effects of hydroquinone.  All of the patient's questions and concerns were addressed.
Erythromycin Pregnancy And Lactation Text: This medication is Pregnancy Category B and is considered safe during pregnancy. It is also excreted in breast milk.
Cimzia Pregnancy And Lactation Text: This medication crosses the placenta but can be considered safe in certain situations. Cimzia may be excreted in breast milk.
Cellcept Pregnancy And Lactation Text: This medication is Pregnancy Category D and isn't considered safe during pregnancy. It is unknown if this medication is excreted in breast milk.
Olanzapine Counseling- I discussed with the patient the common side effects of olanzapine including but are not limited to: lack of energy, dry mouth, increased appetite, sleepiness, tremor, constipation, dizziness, changes in behavior, or restlessness.  Explained that teenagers are more likely to experience headaches, abdominal pain, pain in the arms or legs, tiredness, and sleepiness.  Serious side effects include but are not limited: increased risk of death in elderly patients who are confused, have memory loss, or dementia-related psychosis; hyperglycemia; increased cholesterol and triglycerides; and weight gain.
Valtrex Pregnancy And Lactation Text: this medication is Pregnancy Category B and is considered safe during pregnancy. This medication is not directly found in breast milk but it's metabolite acyclovir is present.
Qbrexza Pregnancy And Lactation Text: There is no available data on Qbrexza use in pregnant women.  There is no available data on Qbrexza use in lactation.
Cantharidin Counseling:  I discussed with the patient the risks of Cantharidin including but not limited to pain, redness, burning, itching, and blistering.
Bactrim Counseling:  I discussed with the patient the risks of sulfa antibiotics including but not limited to GI upset, allergic reaction, drug rash, diarrhea, dizziness, photosensitivity, and yeast infections.  Rarely, more serious reactions can occur including but not limited to aplastic anemia, agranulocytosis, methemoglobinemia, blood dyscrasias, liver or kidney failure, lung infiltrates or desquamative/blistering drug rashes.
Glycopyrrolate Pregnancy And Lactation Text: This medication is Pregnancy Category B and is considered safe during pregnancy. It is unknown if it is excreted breast milk.
Griseofulvin Pregnancy And Lactation Text: This medication is Pregnancy Category X and is known to cause serious birth defects. It is unknown if this medication is excreted in breast milk but breast feeding should be avoided.
Mirvaso Counseling: Mirvaso is a topical medication which can decrease superficial blood flow where applied. Side effects are uncommon and include stinging, redness and allergic reactions.
Rifampin Pregnancy And Lactation Text: This medication is Pregnancy Category C and it isn't know if it is safe during pregnancy. It is also excreted in breast milk and should not be used if you are breast feeding.
Sotyktu Pregnancy And Lactation Text: There is insufficient data to evaluate whether or not Sotyktu is safe to use during pregnancy.? ?It is not known if Sotyktu passes into breast milk and whether or not it is safe to use when breastfeeding.??
Opioid Counseling: I discussed with the patient the potential side effects of opioids including but not limited to addiction, altered mental status, and depression. I stressed avoiding alcohol, benzodiazepines, muscle relaxants and sleep aids unless specifically okayed by a physician. The patient verbalized understanding of the proper use and possible adverse effects of opioids. All of the patient's questions and concerns were addressed. They were instructed to flush the remaining pills down the toilet if they did not need them for pain.
Propranolol Counseling:  I discussed with the patient the risks of propranolol including but not limited to low heart rate, low blood pressure, low blood sugar, restlessness and increased cold sensitivity. They should call the office if they experience any of these side effects.
Ilumya Counseling: I discussed with the patient the risks of tildrakizumab including but not limited to immunosuppression, malignancy, posterior leukoencephalopathy syndrome, and serious infections.  The patient understands that monitoring is required including a PPD at baseline and must alert us or the primary physician if symptoms of infection or other concerning signs are noted.
Finasteride Male Counseling: Finasteride Counseling:  I discussed with the patient the risks of use of finasteride including but not limited to decreased libido, decreased ejaculate volume, gynecomastia, and depression. Women should not handle medication.  All of the patient's questions and concerns were addressed.
Erivedge Counseling- I discussed with the patient the risks of Erivedge including but not limited to nausea, vomiting, diarrhea, constipation, weight loss, changes in the sense of taste, decreased appetite, muscle spasms, and hair loss.  The patient verbalized understanding of the proper use and possible adverse effects of Erivedge.  All of the patient's questions and concerns were addressed.
Winlevi Counseling:  I discussed with the patient the risks of topical clascoterone including but not limited to erythema, scaling, itching, and stinging. Patient voiced their understanding.
Cellcept Counseling:  I discussed with the patient the risks of mycophenolate mofetil including but not limited to infection/immunosuppression, GI upset, hypokalemia, hypercholesterolemia, bone marrow suppression, lymphoproliferative disorders, malignancy, GI ulceration/bleed/perforation, colitis, interstitial lung disease, kidney failure, progressive multifocal leukoencephalopathy, and birth defects.  The patient understands that monitoring is required including a baseline creatinine and regular CBC testing. In addition, patient must alert us immediately if symptoms of infection or other concerning signs are noted.
Isotretinoin Counseling: Patient should get monthly blood tests, not donate blood, not drive at night if vision affected, not share medication, and not undergo elective surgery for 6 months after tx completed. Side effects reviewed, pt to contact office should one occur.
Clofazimine Counseling:  I discussed with the patient the risks of clofazimine including but not limited to skin and eye pigmentation, liver damage, nausea/vomiting, gastrointestinal bleeding and allergy.
Cosentyx Counseling:  I discussed with the patient the risks of Cosentyx including but not limited to worsening of Crohn's disease, immunosuppression, allergic reactions and infections.  The patient understands that monitoring is required including a PPD at baseline and must alert us or the primary physician if symptoms of infection or other concerning signs are noted.
Erythromycin Counseling:  I discussed with the patient the risks of erythromycin including but not limited to GI upset, allergic reaction, drug rash, diarrhea, increase in liver enzymes, and yeast infections.
Calcipotriene Pregnancy And Lactation Text: The use of this medication during pregnancy or lactation is not recommended as there is insufficient data.
Qbrexza Counseling:  I discussed with the patient the risks of Qbrexza including but not limited to headache, mydriasis, blurred vision, dry eyes, nasal dryness, dry mouth, dry throat, dry skin, urinary hesitation, and constipation.  Local skin reactions including erythema, burning, stinging, and itching can also occur.
Azelaic Acid Counseling: Patient counseled that medicine may cause skin irritation and to avoid applying near the eyes.  In the event of skin irritation, the patient was advised to reduce the amount of the drug applied or use it less frequently.   The patient verbalized understanding of the proper use and possible adverse effects of azelaic acid.  All of the patient's questions and concerns were addressed.
Nsaids Pregnancy And Lactation Text: These medications are considered safe up to 30 weeks gestation. It is excreted in breast milk.
Ivermectin Counseling:  Patient instructed to take medication on an empty stomach with a full glass of water.  Patient informed of potential adverse effects including but not limited to nausea, diarrhea, dizziness, itching, and swelling of the extremities or lymph nodes.  The patient verbalized understanding of the proper use and possible adverse effects of ivermectin.  All of the patient's questions and concerns were addressed.
Stelara Counseling:  I discussed with the patient the risks of ustekinumab including but not limited to immunosuppression, malignancy, posterior leukoencephalopathy syndrome, and serious infections.  The patient understands that monitoring is required including a PPD at baseline and must alert us or the primary physician if symptoms of infection or other concerning signs are noted.
Griseofulvin Counseling:  I discussed with the patient the risks of griseofulvin including but not limited to photosensitivity, cytopenia, liver damage, nausea/vomiting and severe allergy.  The patient understands that this medication is best absorbed when taken with a fatty meal (e.g., ice cream or french fries).
Valtrex Counseling: I discussed with the patient the risks of valacyclovir including but not limited to kidney damage, nausea, vomiting and severe allergy.  The patient understands that if the infection seems to be worsening or is not improving, they are to call.
Prednisone Counseling:  I discussed with the patient the risks of prolonged use of prednisone including but not limited to weight gain, insomnia, osteoporosis, mood changes, diabetes, susceptibility to infection, glaucoma and high blood pressure.  In cases where prednisone use is prolonged, patients should be monitored with blood pressure checks, serum glucose levels and an eye exam.  Additionally, the patient may need to be placed on GI prophylaxis, PCP prophylaxis, and calcium and vitamin D supplementation and/or a bisphosphonate.  The patient verbalized understanding of the proper use and the possible adverse effects of prednisone.  All of the patient's questions and concerns were addressed.
Topical Metronidazole Counseling: Metronidazole is a topical antibiotic medication. You may experience burning, stinging, redness, or allergic reactions.  Please call our office if you develop any problems from using this medication.
Calcipotriene Counseling:  I discussed with the patient the risks of calcipotriene including but not limited to erythema, scaling, itching, and irritation.
Glycopyrrolate Counseling:  I discussed with the patient the risks of glycopyrrolate including but not limited to skin rash, drowsiness, dry mouth, difficulty urinating, and blurred vision.
Cimetidine Counseling:  I discussed with the patient the risks of Cimetidine including but not limited to gynecomastia, headache, diarrhea, nausea, drowsiness, arrhythmias, pancreatitis, skin rashes, psychosis, bone marrow suppression and kidney toxicity.
Rifampin Counseling: I discussed with the patient the risks of rifampin including but not limited to liver damage, kidney damage, red-orange body fluids, nausea/vomiting and severe allergy.
Sotyktu Counseling:  I discussed the most common side effects of Sotyktu including: common cold, sore throat, sinus infections, cold sores, canker sores, folliculitis, and acne.? I also discussed more serious side effects of Sotyktu including but not limited to: serious allergic reactions; increased risk for infections such as TB; cancers such as lymphomas; rhabdomyolysis and elevated CPK; and elevated triglycerides and liver enzymes.?
Winlevi Pregnancy And Lactation Text: This medication is considered safe during pregnancy and breastfeeding.
Opioid Pregnancy And Lactation Text: These medications can lead to premature delivery and should be avoided during pregnancy. These medications are also present in breast milk in small amounts.
Bexarotene Pregnancy And Lactation Text: This medication is Pregnancy Category X and should not be given to women who are pregnant or may become pregnant. This medication should not be used if you are breast feeding.
Dutasteride Pregnancy And Lactation Text: This medication is absolutely contraindicated in women, especially during pregnancy and breast feeding. Feminization of male fetuses is possible if taking while pregnant.
Topical Retinoid counseling:  Patient advised to apply a pea-sized amount only at bedtime and wait 30 minutes after washing their face before applying.  If too drying, patient may add a non-comedogenic moisturizer. The patient verbalized understanding of the proper use and possible adverse effects of retinoids.  All of the patient's questions and concerns were addressed.
Doxycycline Pregnancy And Lactation Text: This medication is Pregnancy Category D and not consider safe during pregnancy. It is also excreted in breast milk but is considered safe for shorter treatment courses.
Eucrisa Counseling: Patient may experience a mild burning sensation during topical application. Eucrisa is not approved in children less than 2 years of age.

## 2023-09-10 RX ADMIN — CLOBETASOL PROPIONATE: 0.5 CREAM TOPICAL at 00:00

## 2023-09-11 RX ORDER — CLOBETASOL PROPIONATE 0.5 MG/G
CREAM TOPICAL BID
Qty: 1 | Refills: 1 | Status: ERX | COMMUNITY
Start: 2023-09-10

## 2023-09-25 ENCOUNTER — APPOINTMENT (RX ONLY)
Dept: URBAN - METROPOLITAN AREA CLINIC 20 | Facility: CLINIC | Age: 43
Setting detail: DERMATOLOGY
End: 2023-09-25

## 2023-09-25 DIAGNOSIS — Z71.89 OTHER SPECIFIED COUNSELING: ICD-10-CM

## 2023-09-25 DIAGNOSIS — L82.1 OTHER SEBORRHEIC KERATOSIS: ICD-10-CM

## 2023-09-25 DIAGNOSIS — L57.0 ACTINIC KERATOSIS: ICD-10-CM | Status: INADEQUATELY CONTROLLED

## 2023-09-25 DIAGNOSIS — L81.4 OTHER MELANIN HYPERPIGMENTATION: ICD-10-CM

## 2023-09-25 DIAGNOSIS — L30.8 OTHER SPECIFIED DERMATITIS: ICD-10-CM | Status: IMPROVED

## 2023-09-25 DIAGNOSIS — D18.0 HEMANGIOMA: ICD-10-CM

## 2023-09-25 DIAGNOSIS — D22 MELANOCYTIC NEVI: ICD-10-CM

## 2023-09-25 PROBLEM — D18.01 HEMANGIOMA OF SKIN AND SUBCUTANEOUS TISSUE: Status: ACTIVE | Noted: 2023-09-25

## 2023-09-25 PROBLEM — D22.72 MELANOCYTIC NEVI OF LEFT LOWER LIMB, INCLUDING HIP: Status: ACTIVE | Noted: 2023-09-25

## 2023-09-25 PROBLEM — D22.71 MELANOCYTIC NEVI OF RIGHT LOWER LIMB, INCLUDING HIP: Status: ACTIVE | Noted: 2023-09-25

## 2023-09-25 PROBLEM — D22.39 MELANOCYTIC NEVI OF OTHER PARTS OF FACE: Status: ACTIVE | Noted: 2023-09-25

## 2023-09-25 PROBLEM — D22.61 MELANOCYTIC NEVI OF RIGHT UPPER LIMB, INCLUDING SHOULDER: Status: ACTIVE | Noted: 2023-09-25

## 2023-09-25 PROBLEM — D22.5 MELANOCYTIC NEVI OF TRUNK: Status: ACTIVE | Noted: 2023-09-25

## 2023-09-25 PROBLEM — D22.62 MELANOCYTIC NEVI OF LEFT UPPER LIMB, INCLUDING SHOULDER: Status: ACTIVE | Noted: 2023-09-25

## 2023-09-25 PROCEDURE — ? SUNSCREEN RECOMMENDATIONS

## 2023-09-25 PROCEDURE — 17000 DESTRUCT PREMALG LESION: CPT

## 2023-09-25 PROCEDURE — ? COUNSELING

## 2023-09-25 PROCEDURE — ? ADDITIONAL NOTES

## 2023-09-25 PROCEDURE — 99213 OFFICE O/P EST LOW 20 MIN: CPT | Mod: 25

## 2023-09-25 PROCEDURE — ? LIQUID NITROGEN

## 2023-09-25 ASSESSMENT — LOCATION SIMPLE DESCRIPTION DERM
LOCATION SIMPLE: LEFT POSTERIOR THIGH
LOCATION SIMPLE: LEFT PRETIBIAL REGION
LOCATION SIMPLE: LEFT HAND
LOCATION SIMPLE: RIGHT LOWER BACK
LOCATION SIMPLE: RIGHT POSTERIOR THIGH
LOCATION SIMPLE: LEFT FOREARM
LOCATION SIMPLE: RIGHT FOREHEAD
LOCATION SIMPLE: RIGHT PRETIBIAL REGION
LOCATION SIMPLE: LEFT POSTERIOR UPPER ARM
LOCATION SIMPLE: RIGHT FOREARM
LOCATION SIMPLE: UPPER BACK
LOCATION SIMPLE: CHEST
LOCATION SIMPLE: RIGHT POSTERIOR UPPER ARM
LOCATION SIMPLE: RIGHT UPPER BACK
LOCATION SIMPLE: RIGHT HAND
LOCATION SIMPLE: RIGHT CHEEK

## 2023-09-25 ASSESSMENT — LOCATION ZONE DERM
LOCATION ZONE: HAND
LOCATION ZONE: LEG
LOCATION ZONE: FACE
LOCATION ZONE: TRUNK
LOCATION ZONE: ARM

## 2023-09-25 ASSESSMENT — LOCATION DETAILED DESCRIPTION DERM
LOCATION DETAILED: LEFT PROXIMAL POSTERIOR UPPER ARM
LOCATION DETAILED: LEFT RADIAL DORSAL HAND
LOCATION DETAILED: LEFT LATERAL PROXIMAL PRETIBIAL REGION
LOCATION DETAILED: RIGHT INFERIOR MEDIAL UPPER BACK
LOCATION DETAILED: RIGHT PROXIMAL PRETIBIAL REGION
LOCATION DETAILED: RIGHT RADIAL DORSAL HAND
LOCATION DETAILED: LEFT VENTRAL PROXIMAL FOREARM
LOCATION DETAILED: RIGHT SUPERIOR UPPER BACK
LOCATION DETAILED: RIGHT VENTRAL PROXIMAL FOREARM
LOCATION DETAILED: RIGHT INFERIOR CENTRAL MALAR CHEEK
LOCATION DETAILED: UPPER STERNUM
LOCATION DETAILED: RIGHT DISTAL POSTERIOR THIGH
LOCATION DETAILED: RIGHT DISTAL POSTERIOR UPPER ARM
LOCATION DETAILED: INFERIOR THORACIC SPINE
LOCATION DETAILED: RIGHT INFERIOR MEDIAL MIDBACK
LOCATION DETAILED: RIGHT INFERIOR FOREHEAD
LOCATION DETAILED: LEFT DISTAL POSTERIOR THIGH

## 2023-09-25 NOTE — PROCEDURE: LIQUID NITROGEN
Detail Level: Detailed
Show Applicator Variable?: Yes
Duration Of Freeze Thaw-Cycle (Seconds): 10
Render Note In Bullet Format When Appropriate: No
Number Of Freeze-Thaw Cycles: 2 freeze-thaw cycles
Post-Care Instructions: I reviewed with the patient in detail post-care instructions. Patient is to wear sunprotection, and avoid picking at any of the treated lesions. Pt may apply Vaseline to crusted or scabbing areas.
Consent: The patient's consent was obtained including but not limited to risks of crusting, scabbing, blistering, scarring, darker or lighter pigmentary change, recurrence, incomplete removal and infection. RTC in 2 months if lesion(s) persistent.

## 2023-09-25 NOTE — PROCEDURE: ADDITIONAL NOTES
Render Risk Assessment In Note?: no
Additional Notes: Pt has clobetasol at home prn flares
Detail Level: Detailed

## 2023-10-10 DIAGNOSIS — E11.9 TYPE 2 DIABETES MELLITUS WITHOUT COMPLICATION, WITHOUT LONG-TERM CURRENT USE OF INSULIN (HCC): Chronic | ICD-10-CM

## 2023-10-10 DIAGNOSIS — J45.20 MILD INTERMITTENT ASTHMA WITHOUT COMPLICATION: ICD-10-CM

## 2023-10-10 RX ORDER — ALBUTEROL SULFATE 90 UG/1
2 AEROSOL, METERED RESPIRATORY (INHALATION) EVERY 4 HOURS PRN
Qty: 1 EACH | Refills: 3 | Status: SHIPPED | OUTPATIENT
Start: 2023-10-10 | End: 2024-01-24 | Stop reason: SDUPTHER

## 2023-10-10 RX ORDER — SEMAGLUTIDE 2.68 MG/ML
2 INJECTION, SOLUTION SUBCUTANEOUS
Qty: 6 ML | Refills: 1 | Status: SHIPPED | OUTPATIENT
Start: 2023-10-10 | End: 2023-10-17 | Stop reason: SDUPTHER

## 2023-10-12 ENCOUNTER — HOSPITAL ENCOUNTER (OUTPATIENT)
Dept: RADIOLOGY | Facility: MEDICAL CENTER | Age: 43
End: 2023-10-12
Attending: NURSE PRACTITIONER
Payer: COMMERCIAL

## 2023-10-12 ENCOUNTER — OFFICE VISIT (OUTPATIENT)
Dept: MEDICAL GROUP | Facility: PHYSICIAN GROUP | Age: 43
End: 2023-10-12
Payer: COMMERCIAL

## 2023-10-12 VITALS
TEMPERATURE: 98 F | RESPIRATION RATE: 23 BRPM | OXYGEN SATURATION: 93 % | HEART RATE: 85 BPM | BODY MASS INDEX: 46.1 KG/M2 | DIASTOLIC BLOOD PRESSURE: 66 MMHG | HEIGHT: 64 IN | WEIGHT: 270 LBS | SYSTOLIC BLOOD PRESSURE: 114 MMHG

## 2023-10-12 DIAGNOSIS — R05.1 ACUTE COUGH: ICD-10-CM

## 2023-10-12 DIAGNOSIS — J45.21 MILD INTERMITTENT ASTHMA WITH ACUTE EXACERBATION: ICD-10-CM

## 2023-10-12 DIAGNOSIS — R09.81 NASAL CONGESTION: ICD-10-CM

## 2023-10-12 LAB
FLUAV RNA SPEC QL NAA+PROBE: NEGATIVE
FLUBV RNA SPEC QL NAA+PROBE: NEGATIVE
RSV RNA SPEC QL NAA+PROBE: NEGATIVE
S PYO DNA SPEC NAA+PROBE: NOT DETECTED
SARS-COV-2 RNA RESP QL NAA+PROBE: NEGATIVE

## 2023-10-12 PROCEDURE — 87651 STREP A DNA AMP PROBE: CPT | Performed by: NURSE PRACTITIONER

## 2023-10-12 PROCEDURE — 71046 X-RAY EXAM CHEST 2 VIEWS: CPT

## 2023-10-12 PROCEDURE — 94640 AIRWAY INHALATION TREATMENT: CPT | Performed by: NURSE PRACTITIONER

## 2023-10-12 PROCEDURE — 3074F SYST BP LT 130 MM HG: CPT | Performed by: NURSE PRACTITIONER

## 2023-10-12 PROCEDURE — 99214 OFFICE O/P EST MOD 30 MIN: CPT | Mod: 25 | Performed by: NURSE PRACTITIONER

## 2023-10-12 PROCEDURE — 0241U POCT CEPHEID COV-2, FLU A/B, RSV - PCR: CPT | Performed by: NURSE PRACTITIONER

## 2023-10-12 PROCEDURE — 3078F DIAST BP <80 MM HG: CPT | Performed by: NURSE PRACTITIONER

## 2023-10-12 RX ORDER — IPRATROPIUM BROMIDE AND ALBUTEROL SULFATE 2.5; .5 MG/3ML; MG/3ML
3 SOLUTION RESPIRATORY (INHALATION) ONCE
Status: COMPLETED | OUTPATIENT
Start: 2023-10-12 | End: 2023-10-12

## 2023-10-12 RX ORDER — CLOBETASOL PROPIONATE 0.5 MG/G
CREAM TOPICAL
COMMUNITY
Start: 2023-09-11 | End: 2023-10-12

## 2023-10-12 RX ORDER — PREDNISONE 20 MG/1
40 TABLET ORAL DAILY
Qty: 10 TABLET | Refills: 0 | Status: SHIPPED | OUTPATIENT
Start: 2023-10-12 | End: 2023-10-17

## 2023-10-12 RX ORDER — BUDESONIDE AND FORMOTEROL FUMARATE DIHYDRATE 80; 4.5 UG/1; UG/1
AEROSOL RESPIRATORY (INHALATION)
Qty: 1 EACH | Refills: 1 | Status: SHIPPED | OUTPATIENT
Start: 2023-10-12 | End: 2024-01-24 | Stop reason: SDUPTHER

## 2023-10-12 RX ADMIN — IPRATROPIUM BROMIDE AND ALBUTEROL SULFATE 3 ML: 2.5; .5 SOLUTION RESPIRATORY (INHALATION) at 15:12

## 2023-10-12 ASSESSMENT — ENCOUNTER SYMPTOMS
EYES NEGATIVE: 1
SPUTUM PRODUCTION: 0
NEUROLOGICAL NEGATIVE: 1
MUSCULOSKELETAL NEGATIVE: 1
PALPITATIONS: 0
CONSTITUTIONAL NEGATIVE: 1
SHORTNESS OF BREATH: 0
GASTROINTESTINAL NEGATIVE: 1
WHEEZING: 1
PSYCHIATRIC NEGATIVE: 1
SORE THROAT: 0
COUGH: 1
FEVER: 0

## 2023-10-12 ASSESSMENT — FIBROSIS 4 INDEX: FIB4 SCORE: 1.16

## 2023-10-12 NOTE — PROGRESS NOTES
Subjective       CC:   Chief Complaint   Patient presents with    Shortness of Breath     Cough, chest congestion all over body aches, 1 week of symptoms, really worse this morning        HPI:   Patient is a 43 y.o. established female patient with medical history of diabetes mellitus type 2, hypertension, asthma today with complaints of dry cough, wheezing, chest + nasal congestion, all over body aches the past week; started last Thursday 10/12/2023 while she was at work. No fever. She last used albuterol around noon today; has been needing it every 4 hours.    Patient Active Problem List   Diagnosis    Closed fracture of left wrist    Type 2 diabetes mellitus without complication, without long-term current use of insulin ([facility])    Mild intermittent asthma without complication    Morbid obesity with body mass index (BMI) of 45.0 to 49.9 in adult ([facility])    Elevated cholesterol/high density lipoprotein ratio    Well woman exam with routine gynecological exam    Vaginal itching    Primary hypertension    Abnormal Papanicolaou smear of cervix with positive human papilloma virus (HPV) test    Vitamin D deficiency       Past Medical History:   Diagnosis Date    Asthma     child, occasional albuterol now.     DM (diabetes mellitus) ([facility]) 02/03/2020    diet controlled    Primary hypertension 12/31/2021        Past Surgical History:   Procedure Laterality Date    PB OPEN TX RADIAL & ULNAR SHAFT FX FIX RADIUS A*  2/4/2020    Procedure: ORIF, FRACTURE, RADIUS AND ULNA;  Surgeon: Ambrosio Lraa M.D.;  Location: SURGERY [facility];  Service: General    ORIF, WRIST Left 2/4/2020    Procedure: ORIF, WRIST- DISTAL RADIUS;  Surgeon: Ambrosio Lara M.D.;  Location: SURGERY [facility];  Service: General    GYN SURGERY  2007/2010    c/s x2    TUBAL COAGULATION LAPAROSCOPIC BILATERAL          Current Outpatient Medications on File Prior to Visit   Medication Sig Dispense Refill    albuterol 108 (90 Base) MCG/ACT Aero Soln  "inhalation aerosol Inhale 2 Puffs every four hours as needed for Shortness of Breath. 1 Each 3    Semaglutide, 2 MG/DOSE, (OZEMPIC, 2 MG/DOSE,) 8 MG/3ML Solution Pen-injector Inject 2 mg under the skin every 7 days. 6 mL 1    glipiZIDE (GLUCOTROL) 5 MG Tab       lisinopril (PRINIVIL) 5 MG Tab Take 1 Tablet by mouth every day. 90 Tablet 3    hydroCHLOROthiazide (HYDRODIURIL) 12.5 MG tablet Take 1 Tablet by mouth every day. 90 Tablet 3    atorvastatin (LIPITOR) 20 MG Tab Take 1 Tablet by mouth every day. 90 Tablet 3    Multiple Vitamin (MULTIVITAMIN ADULT PO) Take  by mouth.      Lancets Lancets order: Lancets for f Insurance Preference. meter. Sig: use BID and prn ssx high or low sugar. #100 RF x 3 200 Each 0    Blood Glucose Monitoring Suppl Device Meter: Dispense Device of Insurance Preference. Sig. Use as directed for blood sugar monitoring. #1. NR. 1 Each 0    VITAMIN D-VITAMIN K PO Take 5,000 Units by mouth.      Probiotic Product (PROBIOTIC DAILY PO) Take  by mouth.       No current facility-administered medications on file prior to visit.        ROS:  Review of Systems   Constitutional: Negative.  Negative for fever and malaise/fatigue.   HENT:  Positive for congestion. Negative for sore throat.    Eyes: Negative.    Respiratory:  Positive for cough and wheezing. Negative for sputum production and shortness of breath.    Cardiovascular:  Negative for chest pain, palpitations and leg swelling.   Gastrointestinal: Negative.    Genitourinary: Negative.    Musculoskeletal: Negative.    Neurological: Negative.    Endo/Heme/Allergies: Negative.    Psychiatric/Behavioral: Negative.         Objective       Exam:  /66   Pulse 85   Temp 36.7 °C (98 °F) (Oral)   Resp (!) 23   Ht 1.626 m (5' 4\")   Wt 122 kg (270 lb)   SpO2 93%   BMI 46.35 kg/m²  Body mass index is 46.35 kg/m².    Physical Exam  Constitutional:       Appearance: Normal appearance.   Cardiovascular:      Rate and Rhythm: Normal rate and regular " rhythm.      Pulses: Normal pulses.      Heart sounds: Normal heart sounds.   Pulmonary:      Effort: Pulmonary effort is normal.      Breath sounds: Examination of the right-lower field reveals wheezing. Examination of the left-lower field reveals wheezing. Wheezing present. No rhonchi or rales.   Musculoskeletal:      Cervical back: Normal range of motion and neck supple.   Skin:     General: Skin is warm and dry.   Neurological:      Mental Status: She is alert.        Latest Reference Range & Units 10/12/23 15:33   Influenza virus A RNA Negative, Invalid  Negative   Influenza virus B, PCR Negative, Invalid  Negative   RSV, PCR Negative, Invalid  Negative   SARS-CoV-2 by PCR Negative, Invalid  Negative     Cxray 10/12/2023  FINDINGS:  Cardiomediastinal contours are stable.     No pulmonary consolidation is seen.     No pleural space process is evident.     IMPRESSION:     No radiographic evidence of acute cardiopulmonary process.    Assessment & Plan       43 y.o. female with the following -   1. Acute cough  2. Mild intermittent asthma with acute exacerbation  3. Nasal congestion  Dry cough, wheezing, nasal & chest congestion x1 week; no fever, sore throat; some shortness of breath when she coughs. Hx asthma on prn albuterol only. She has been using albuterol every 4 hours with some relief.   Cxray today - No radiographic evidence of acute cardiopulmonary process  COVID/FLU/RSV swab - NEGATIVE  STREP swab - NEGATIVE    - duo neb administered in clinic today; states had good relief from wheezing, chest tightness  - start prednisone 40mg QD x5 days  - switch albuterol to BID ICS-LABA + Q4hr prn with Symbicort-80; can taper down to just prn as controller when acute exacerbation resolves.     - POCT CEPHEID COV-2, FLU A/B, RSV - PCR  - POCT CEPHEID GROUP A STREP - PCR  - DX-CHEST-2 VIEWS; Future    Educated in proper administration of medication(s) ordered today including safety, possible SE, risks, benefits,  rationale and alternatives to therapy.       Return in about 2 weeks (around 10/26/2023) for asthma exacerbation.    Please note that this dictation was created using voice recognition software. I have made every reasonable attempt to correct obvious errors, but I expect that there are errors of grammar and possibly content that I did not discover before finalizing the note.

## 2023-10-12 NOTE — LETTER
October 12, 2023    To Whom It May Concern:         This is confirmation that Marisabel Simons attended her scheduled appointment with Belén Quiroz D.N.P. on 10/12/23. She is starting treatment today for her symptoms. She may return to work by Monday 10/16/2023. Please excuse her from any missed days at work starting 10/12/2023-10/16/2023.         If you have any questions please do not hesitate to call me at the phone number listed below.    Sincerely,          MARIZOL GrewalP.  [phone]

## 2023-10-16 DIAGNOSIS — E11.9 TYPE 2 DIABETES MELLITUS WITHOUT COMPLICATION, WITHOUT LONG-TERM CURRENT USE OF INSULIN (HCC): ICD-10-CM

## 2023-10-17 RX ORDER — SEMAGLUTIDE 2.68 MG/ML
2 INJECTION, SOLUTION SUBCUTANEOUS
Qty: 6 ML | Refills: 1 | Status: SHIPPED | OUTPATIENT
Start: 2023-10-17 | End: 2023-11-14 | Stop reason: SDUPTHER

## 2023-10-17 RX ORDER — SEMAGLUTIDE 2.68 MG/ML
INJECTION, SOLUTION SUBCUTANEOUS
OUTPATIENT
Start: 2023-10-17

## 2023-11-06 ENCOUNTER — PATIENT MESSAGE (OUTPATIENT)
Dept: MEDICAL GROUP | Facility: PHYSICIAN GROUP | Age: 43
End: 2023-11-06
Payer: COMMERCIAL

## 2023-11-06 DIAGNOSIS — E11.9 TYPE 2 DIABETES MELLITUS WITHOUT COMPLICATION, WITHOUT LONG-TERM CURRENT USE OF INSULIN (HCC): Chronic | ICD-10-CM

## 2023-11-06 RX ORDER — SEMAGLUTIDE 1.34 MG/ML
2 INJECTION, SOLUTION SUBCUTANEOUS
Qty: 6 ML | Refills: 0 | Status: SHIPPED | OUTPATIENT
Start: 2023-11-06 | End: 2023-11-14

## 2023-11-07 NOTE — PROGRESS NOTES
1. Type 2 diabetes mellitus without complication, without long-term current use of insulin ([facility])  Per pt [facility] out of 2mg pens and will give 1mg pens  - Semaglutide, 1 MG/DOSE, (OZEMPIC, 1 MG/DOSE,) 4 MG/3ML Solution Pen-injector; Inject 2 mg under the skin every 7 days.  Dispense: 6 mL; Refill: 0

## 2023-11-10 ENCOUNTER — HOSPITAL ENCOUNTER (OUTPATIENT)
Dept: LAB | Facility: MEDICAL CENTER | Age: 43
End: 2023-11-10
Attending: NURSE PRACTITIONER
Payer: COMMERCIAL

## 2023-11-10 ENCOUNTER — OFFICE VISIT (OUTPATIENT)
Dept: MEDICAL GROUP | Facility: PHYSICIAN GROUP | Age: 43
End: 2023-11-10
Payer: COMMERCIAL

## 2023-11-10 VITALS
TEMPERATURE: 97.3 F | HEART RATE: 76 BPM | OXYGEN SATURATION: 98 % | HEIGHT: 64 IN | RESPIRATION RATE: 18 BRPM | BODY MASS INDEX: 46.61 KG/M2 | DIASTOLIC BLOOD PRESSURE: 74 MMHG | SYSTOLIC BLOOD PRESSURE: 118 MMHG | WEIGHT: 273 LBS

## 2023-11-10 DIAGNOSIS — E11.9 TYPE 2 DIABETES MELLITUS WITHOUT COMPLICATION, WITHOUT LONG-TERM CURRENT USE OF INSULIN (HCC): Chronic | ICD-10-CM

## 2023-11-10 LAB
EST. AVERAGE GLUCOSE BLD GHB EST-MCNC: 223 MG/DL
HBA1C MFR BLD: 9.4 % (ref 4–5.6)

## 2023-11-10 PROCEDURE — 3074F SYST BP LT 130 MM HG: CPT | Performed by: NURSE PRACTITIONER

## 2023-11-10 PROCEDURE — 83036 HEMOGLOBIN GLYCOSYLATED A1C: CPT

## 2023-11-10 PROCEDURE — 36415 COLL VENOUS BLD VENIPUNCTURE: CPT

## 2023-11-10 PROCEDURE — 3078F DIAST BP <80 MM HG: CPT | Performed by: NURSE PRACTITIONER

## 2023-11-10 PROCEDURE — 99214 OFFICE O/P EST MOD 30 MIN: CPT | Performed by: NURSE PRACTITIONER

## 2023-11-10 ASSESSMENT — ENCOUNTER SYMPTOMS
PALPITATIONS: 0
CONSTITUTIONAL NEGATIVE: 1
GASTROINTESTINAL NEGATIVE: 1
SPUTUM PRODUCTION: 0
EYES NEGATIVE: 1
FEVER: 0
MUSCULOSKELETAL NEGATIVE: 1
PSYCHIATRIC NEGATIVE: 1
COUGH: 0
SHORTNESS OF BREATH: 0
NEUROLOGICAL NEGATIVE: 1

## 2023-11-10 ASSESSMENT — FIBROSIS 4 INDEX: FIB4 SCORE: 1.16

## 2023-11-10 NOTE — ASSESSMENT & PLAN NOTE
Chronic; A1C goal < 6.5  A1C 9.1 on 8/4/2023 (down from 10.8); she no longer wishes to see pharm for management. She inquired about switching trulicity to ozempic which we started on 4/2/20253, tolerating well; increased to 1mg in 5/7/2023, & then to 2mg on 8/4/2023. She is doing kickboxing. She decided on her own not to take metformin any more about 6 months. She would like to continue on just Ozempic for diabetes management.   - continue Semaglutie IJ 2mg Q7days;  - recheck a1c with lab today; POCT machine is broken; plan to add Jardiance if A1C > 6.5 per patient preference

## 2023-11-10 NOTE — PROGRESS NOTES
Subjective       CC:   Chief Complaint   Patient presents with    Diabetes Follow-up        HPI:   Patient is a 43 y.o. established female patient with medical history listed below here today for evaluation and management of diabetes mellitus type 2. We increased ozempic to 2mg at our last visit. Tolerating well. States last dose was 2 weeks ago, she will follow up with pharmacy on refill.   Cough, congestion resolved with medrol.     Problem   Type 2 Diabetes Mellitus Without Complication, Without Long-Term Current Use of Insulin ([facility])    Switched to Ozempic 0.5mg Q7days from Trulicity 4/2/2023, increased to 1mg in 5/2023, increased to 2mg in 8/4/2023. Stopped taking Metformin by choice early 2023  Last visit with [facility] 12/28/2022 (no longer following); d/juanita glipizide when she restarted Trulicity in 2022. Farxiga gave her yeast infection. Insurance did not cover Pioglitazone so she never took this.    ; Microalbum creat ratio 110  HTN managed on Lisinopril, HCTZ   Dyslipidemia managed on Atorvastatin  No neuropathy reported. Normal Monofilament 8/2023.  Retinal Screen: 4/2023, no retinopathy           Patient Active Problem List   Diagnosis    Closed fracture of left wrist    Type 2 diabetes mellitus without complication, without long-term current use of insulin ([facility])    Mild intermittent asthma without complication    Morbid obesity with body mass index (BMI) of 45.0 to 49.9 in adult ([facility])    Elevated cholesterol/high density lipoprotein ratio    Well woman exam with routine gynecological exam    Vaginal itching    Primary hypertension    Abnormal Papanicolaou smear of cervix with positive human papilloma virus (HPV) test    Vitamin D deficiency       Past Medical History:   Diagnosis Date    Asthma     child, occasional albuterol now.     DM (diabetes mellitus) ([facility]) 02/03/2020    diet controlled    Primary hypertension 12/31/2021        Past Surgical History:   Procedure Laterality Date    PB OPEN  TX RADIAL & ULNAR SHAFT FX FIX RADIUS A*  2/4/2020    Procedure: ORIF, FRACTURE, RADIUS AND ULNA;  Surgeon: Ambrosio Lara M.D.;  Location: SURGERY [facility];  Service: General    ORIF, WRIST Left 2/4/2020    Procedure: ORIF, WRIST- DISTAL RADIUS;  Surgeon: Ambrosio Lara M.D.;  Location: SURGERY [facility];  Service: General    GYN SURGERY  2007/2010    c/s x2    TUBAL COAGULATION LAPAROSCOPIC BILATERAL          Current Outpatient Medications on File Prior to Visit   Medication Sig Dispense Refill    Semaglutide, 1 MG/DOSE, (OZEMPIC, 1 MG/DOSE,) 4 MG/3ML Solution Pen-injector Inject 2 mg under the skin every 7 days. 6 mL 0    Semaglutide, 2 MG/DOSE, (OZEMPIC, 2 MG/DOSE,) 8 MG/3ML Solution Pen-injector Inject 2 mg under the skin every 7 days. 6 mL 1    budesonide-formoterol (SYMBICORT) 80-4.5 MCG/ACT Aerosol Inhale 1 Puff 2 times a day. May also inhale 1 Puff every four hours as needed (cough, wheezing). 1 Each 1    albuterol 108 (90 Base) MCG/ACT Aero Soln inhalation aerosol Inhale 2 Puffs every four hours as needed for Shortness of Breath. 1 Each 3    glipiZIDE (GLUCOTROL) 5 MG Tab       lisinopril (PRINIVIL) 5 MG Tab Take 1 Tablet by mouth every day. 90 Tablet 3    hydroCHLOROthiazide (HYDRODIURIL) 12.5 MG tablet Take 1 Tablet by mouth every day. 90 Tablet 3    atorvastatin (LIPITOR) 20 MG Tab Take 1 Tablet by mouth every day. 90 Tablet 3    Multiple Vitamin (MULTIVITAMIN ADULT PO) Take  by mouth.      Lancets Lancets order: Lancets for f Insurance Preference. meter. Sig: use BID and prn ssx high or low sugar. #100 RF x 3 200 Each 0    Blood Glucose Monitoring Suppl Device Meter: Dispense Device of Insurance Preference. Sig. Use as directed for blood sugar monitoring. #1. NR. 1 Each 0    VITAMIN D-VITAMIN K PO Take 5,000 Units by mouth.      Probiotic Product (PROBIOTIC DAILY PO) Take  by mouth.       No current facility-administered medications on file prior to visit.        Health Maintenance:  "Completed    ROS:  Review of Systems   Constitutional: Negative.  Negative for fever and malaise/fatigue.   HENT: Negative.     Eyes: Negative.    Respiratory:  Negative for cough, sputum production and shortness of breath.    Cardiovascular:  Negative for chest pain, palpitations and leg swelling.   Gastrointestinal: Negative.    Genitourinary: Negative.    Musculoskeletal: Negative.    Neurological: Negative.    Endo/Heme/Allergies: Negative.    Psychiatric/Behavioral: Negative.         Objective       Exam:  /74   Pulse 76   Temp 36.3 °C (97.3 °F) (Temporal)   Resp 18   Ht 1.626 m (5' 4\")   Wt 124 kg (273 lb)   SpO2 98%   BMI 46.86 kg/m²  Body mass index is 46.86 kg/m².    Physical Exam  Constitutional:       Appearance: Normal appearance.   Neurological:      Mental Status: She is alert.         Assessment & Plan       43 y.o. female with the following -     Problem List Items Addressed This Visit       Type 2 diabetes mellitus without complication, without long-term current use of insulin (HCC) (Chronic)     Chronic; A1C goal < 6.5  A1C 9.1 on 8/4/2023 (down from 10.8); she no longer wishes to see pharm for management. She inquired about switching trulicity to ozempic which we started on 4/2/20253, tolerating well; increased to 1mg in 5/7/2023, & then to 2mg on 8/4/2023. She is doing kickboxing. She decided on her own not to take metformin any more about 6 months. She would like to continue on just Ozempic for diabetes management.   - continue Semaglutie IJ 2mg Q7days;  - recheck a1c with lab today; POCT machine is broken; plan to add Jardiance if A1C > 6.5 per patient preference         Relevant Orders    HEMOGLOBIN A1C     Educated in proper administration of medication(s) ordered today including safety, possible SE, risks, benefits, rationale and alternatives to therapy.     Return in about 3 months (around 2/10/2024) for dm2 mgt.    Please note that this dictation was created using voice " recognition software. I have made every reasonable attempt to correct obvious errors, but I expect that there are errors of grammar and possibly content that I did not discover before finalizing the note.         home meds: hydralazine 50mg q8hrs, norvasc 10mg qd    - c/w home norvasc 10mg daily  - c/w hydralazine 75mg q8hr

## 2023-11-11 DIAGNOSIS — E11.9 TYPE 2 DIABETES MELLITUS WITHOUT COMPLICATION, WITHOUT LONG-TERM CURRENT USE OF INSULIN (HCC): Chronic | ICD-10-CM

## 2023-11-11 RX ORDER — EMPAGLIFLOZIN 10 MG/1
10 TABLET, FILM COATED ORAL DAILY
Qty: 90 TABLET | Refills: 1 | Status: SHIPPED | OUTPATIENT
Start: 2023-11-11

## 2023-11-12 NOTE — PROGRESS NOTES
1. Type 2 diabetes mellitus without complication, without long-term current use of insulin ([facility])  A1c 9.4; on ozempic 2mg; adding jardiance. Declined metformin for now.   - Empagliflozin (JARDIANCE) 10 MG Tab tablet; Take 1 Tablet by mouth every day.  Dispense: 90 Tablet; Refill: 1

## 2023-11-14 ENCOUNTER — PATIENT MESSAGE (OUTPATIENT)
Dept: MEDICAL GROUP | Facility: PHYSICIAN GROUP | Age: 43
End: 2023-11-14
Payer: COMMERCIAL

## 2023-11-14 DIAGNOSIS — E11.9 TYPE 2 DIABETES MELLITUS WITHOUT COMPLICATION, WITHOUT LONG-TERM CURRENT USE OF INSULIN (HCC): ICD-10-CM

## 2023-11-14 RX ORDER — SEMAGLUTIDE 2.68 MG/ML
2 INJECTION, SOLUTION SUBCUTANEOUS
Qty: 6 ML | Refills: 1 | Status: SHIPPED | OUTPATIENT
Start: 2023-11-14

## 2024-01-03 RX ORDER — GLIPIZIDE 5 MG/1
TABLET ORAL
Qty: 60 TABLET | Refills: 0 | OUTPATIENT
Start: 2024-01-03

## 2024-01-12 RX ORDER — GLIPIZIDE 5 MG/1
5 TABLET ORAL DAILY
Qty: 60 TABLET | Refills: 0 | Status: CANCELLED | OUTPATIENT
Start: 2024-01-12

## 2024-01-12 NOTE — TELEPHONE ENCOUNTER
Received request via: Patient    Was the patient seen in the last year in this department? Yes    Does the patient have an active prescription (recently filled or refills available) for medication(s) requested? No    Does the patient have FPC Plus and need 100 day supply (blood pressure, diabetes and cholesterol meds only)? Patient does not have SCP      Patient needs refill has been without medication for two weeks/ please advise.

## 2024-01-18 DIAGNOSIS — J45.21 MILD INTERMITTENT ASTHMA WITH ACUTE EXACERBATION: ICD-10-CM

## 2024-01-18 RX ORDER — BUDESONIDE AND FORMOTEROL FUMARATE DIHYDRATE 80; 4.5 UG/1; UG/1
AEROSOL RESPIRATORY (INHALATION)
Qty: 1 EACH | Refills: 0 | Status: CANCELLED | OUTPATIENT
Start: 2024-01-18

## 2024-01-24 DIAGNOSIS — J45.21 MILD INTERMITTENT ASTHMA WITH ACUTE EXACERBATION: ICD-10-CM

## 2024-01-24 DIAGNOSIS — J45.20 MILD INTERMITTENT ASTHMA WITHOUT COMPLICATION: ICD-10-CM

## 2024-01-26 RX ORDER — BUDESONIDE AND FORMOTEROL FUMARATE DIHYDRATE 80; 4.5 UG/1; UG/1
AEROSOL RESPIRATORY (INHALATION)
Qty: 1 EACH | Refills: 3 | Status: SHIPPED | OUTPATIENT
Start: 2024-01-26

## 2024-01-26 RX ORDER — ALBUTEROL SULFATE 90 UG/1
2 AEROSOL, METERED RESPIRATORY (INHALATION) EVERY 4 HOURS PRN
Qty: 1 EACH | Refills: 2 | Status: SHIPPED | OUTPATIENT
Start: 2024-01-26

## 2024-01-26 NOTE — TELEPHONE ENCOUNTER
Received request via: Patient    Was the patient seen in the last year in this department? Yes    Does the patient have an active prescription (recently filled or refills available) for medication(s) requested? No    Pharmacy Name: [facility]    Does the patient have [facility] Plus and need 100 day supply (blood pressure, diabetes and cholesterol meds only)? Patient does not have SCP

## 2024-03-29 ENCOUNTER — APPOINTMENT (OUTPATIENT)
Dept: MEDICAL GROUP | Facility: PHYSICIAN GROUP | Age: 44
End: 2024-03-29
Payer: COMMERCIAL

## 2024-03-29 VITALS
HEIGHT: 64 IN | DIASTOLIC BLOOD PRESSURE: 70 MMHG | BODY MASS INDEX: 46.1 KG/M2 | OXYGEN SATURATION: 96 % | HEART RATE: 89 BPM | SYSTOLIC BLOOD PRESSURE: 154 MMHG | TEMPERATURE: 97.5 F | WEIGHT: 270 LBS

## 2024-03-29 DIAGNOSIS — R87.618 ABNORMAL PAPANICOLAOU SMEAR OF CERVIX WITH POSITIVE HUMAN PAPILLOMA VIRUS (HPV) TEST: ICD-10-CM

## 2024-03-29 DIAGNOSIS — E11.9 TYPE 2 DIABETES MELLITUS WITHOUT COMPLICATION, WITHOUT LONG-TERM CURRENT USE OF INSULIN (HCC): Chronic | ICD-10-CM

## 2024-03-29 DIAGNOSIS — E78.6 ELEVATED CHOLESTEROL/HIGH DENSITY LIPOPROTEIN RATIO: ICD-10-CM

## 2024-03-29 DIAGNOSIS — E55.9 VITAMIN D DEFICIENCY: ICD-10-CM

## 2024-03-29 DIAGNOSIS — I10 PRIMARY HYPERTENSION: ICD-10-CM

## 2024-03-29 LAB
HBA1C MFR BLD: 9.9 % (ref ?–5.8)
POCT INT CON NEG: NEGATIVE
POCT INT CON POS: POSITIVE

## 2024-03-29 RX ORDER — LISINOPRIL 10 MG/1
10 TABLET ORAL DAILY
Qty: 90 TABLET | Refills: 3 | Status: SHIPPED | OUTPATIENT
Start: 2024-03-29

## 2024-03-29 RX ORDER — SEMAGLUTIDE 2.68 MG/ML
2 INJECTION, SOLUTION SUBCUTANEOUS
Qty: 6 ML | Refills: 3 | Status: SHIPPED | OUTPATIENT
Start: 2024-03-29

## 2024-03-29 RX ORDER — EMPAGLIFLOZIN 25 MG/1
1 TABLET, FILM COATED ORAL DAILY
Qty: 90 TABLET | Refills: 3 | Status: SHIPPED | OUTPATIENT
Start: 2024-03-29

## 2024-03-29 RX ORDER — ATORVASTATIN CALCIUM 20 MG/1
20 TABLET, FILM COATED ORAL DAILY
Qty: 90 TABLET | Refills: 3 | Status: SHIPPED | OUTPATIENT
Start: 2024-03-29

## 2024-03-29 ASSESSMENT — ENCOUNTER SYMPTOMS
MUSCULOSKELETAL NEGATIVE: 1
NEUROLOGICAL NEGATIVE: 1
PSYCHIATRIC NEGATIVE: 1
PALPITATIONS: 0
GASTROINTESTINAL NEGATIVE: 1
EYES NEGATIVE: 1
COUGH: 0
SPUTUM PRODUCTION: 0
FEVER: 0
CONSTITUTIONAL NEGATIVE: 1
SHORTNESS OF BREATH: 0

## 2024-03-29 ASSESSMENT — PATIENT HEALTH QUESTIONNAIRE - PHQ9: CLINICAL INTERPRETATION OF PHQ2 SCORE: 0

## 2024-03-29 ASSESSMENT — FIBROSIS 4 INDEX: FIB4 SCORE: 1.19

## 2024-03-29 NOTE — PROGRESS NOTES
Subjective       CC:   Chief Complaint   Patient presents with    Diabetes Follow-up        HPI:   Patient is a 44 y.o. established female patient with medical history listed below here today for evaluation and management of diabetes mellitus type 2.     Problem   Primary Hypertension    Taking lisinopril 5mg QD (increasing dose today), HCTZ 12.5mg QD (added 4/2022)  Also with hx diabetes mellitus type 2, dyslipidemia     Abnormal Papanicolaou Smear of Cervix With Positive Human Papilloma Virus (Hpv) Test   Elevated Cholesterol/High Density Lipoprotein Ratio    Taking atorvastatin 20mg QHS  Also with hx diabetes mellitus type 2, hypertension          Type 2 Diabetes Mellitus Without Complication, Without Long-Term Current Use of Insulin ([facility])    Switched to Ozempic 0.5mg Q7days from Trulicity 4/2/2023, increased to 1mg in 5/2023, increased to 2mg in 8/4/2023. Stopped taking Metformin by choice early 2023; adding back today  Jardiance added 11/2023, increasing dose today  Last visit with [facility] 12/28/2022 (no longer following); d/juanita glipizide when she restarted Trulicity in 2022. Farxiga gave her yeast infection. Insurance did not cover Pioglitazone so she never took this.    ; Microalbum creat ratio 110  HTN managed on Lisinopril, HCTZ   Dyslipidemia managed on Atorvastatin  No neuropathy reported. Normal Monofilament 8/2023.  Retinal Screen: 4/2023, no retinopathy           Patient Active Problem List   Diagnosis    Closed fracture of left wrist    Type 2 diabetes mellitus without complication, without long-term current use of insulin ([facility])    Mild intermittent asthma without complication    Morbid obesity with body mass index (BMI) of 45.0 to 49.9 in adult (HCC)    Elevated cholesterol/high density lipoprotein ratio    Well woman exam with routine gynecological exam    Vaginal itching    Primary hypertension    Abnormal Papanicolaou smear of cervix with positive human papilloma virus (HPV) test     Vitamin D deficiency       Past Medical History:   Diagnosis Date    Asthma     child, occasional albuterol now.     DM (diabetes mellitus) ([facility]) 02/03/2020    diet controlled    Primary hypertension 12/31/2021        Past Surgical History:   Procedure Laterality Date    PB OPEN TX RADIAL & ULNAR SHAFT FX FIX RADIUS A*  2/4/2020    Procedure: ORIF, FRACTURE, RADIUS AND ULNA;  Surgeon: Ambrosio Lara M.D.;  Location: SURGERY [facility];  Service: General    ORIF, WRIST Left 2/4/2020    Procedure: ORIF, WRIST- DISTAL RADIUS;  Surgeon: Ambrosio Lara M.D.;  Location: SURGERY [facility];  Service: General    GYN SURGERY  2007/2010    c/s x2    TUBAL COAGULATION LAPAROSCOPIC BILATERAL          Current Outpatient Medications on File Prior to Visit   Medication Sig Dispense Refill    albuterol 108 (90 Base) MCG/ACT Aero Soln inhalation aerosol Inhale 2 Puffs every four hours as needed for Shortness of Breath. 1 Each 2    budesonide-formoterol (SYMBICORT) 80-4.5 MCG/ACT Aerosol Inhale 1 Puff 2 times a day. May also inhale 1 Puff every four hours as needed (cough, wheezing). 1 Each 3    hydroCHLOROthiazide (HYDRODIURIL) 12.5 MG tablet Take 1 Tablet by mouth every day. 90 Tablet 3    Multiple Vitamin (MULTIVITAMIN ADULT PO) Take  by mouth.      Lancets Lancets order: Lancets for f Insurance Preference. meter. Sig: use BID and prn ssx high or low sugar. #100 RF x 3 200 Each 0    Blood Glucose Monitoring Suppl Device Meter: Dispense Device of Insurance Preference. Sig. Use as directed for blood sugar monitoring. #1. NR. 1 Each 0    VITAMIN D-VITAMIN K PO Take 5,000 Units by mouth.      Probiotic Product (PROBIOTIC DAILY PO) Take  by mouth.       No current facility-administered medications on file prior to visit.        Health Maintenance: Completed  - get PAP results from [facility]    ROS:  Review of Systems   Constitutional: Negative.  Negative for fever and malaise/fatigue.   HENT: Negative.     Eyes:  "Negative.    Respiratory:  Negative for cough, sputum production and shortness of breath.    Cardiovascular:  Negative for chest pain, palpitations and leg swelling.   Gastrointestinal: Negative.    Genitourinary: Negative.    Musculoskeletal: Negative.    Neurological: Negative.    Endo/Heme/Allergies: Negative.    Psychiatric/Behavioral: Negative.         Objective       Exam:  BP (!) 154/70 (BP Location: Right arm, Patient Position: Sitting, BP Cuff Size: Adult long)   Pulse 89   Temp 36.4 °C (97.5 °F) (Temporal)   Ht 1.626 m (5' 4\")   Wt 122 kg (270 lb)   SpO2 96%   BMI 46.35 kg/m²  Body mass index is 46.35 kg/m².    Physical Exam  Constitutional:       Appearance: Normal appearance.   Neurological:      Mental Status: She is alert.         Assessment & Plan       44 y.o. female with the following -     Problem List Items Addressed This Visit       Type 2 diabetes mellitus without complication, without long-term current use of insulin (HCC) (Chronic)     Chronic; A1C goal < 6.5; with microalbuminuria  She no longer wishes to see pharm for management (last visit with [facility] was 12/28/2022). She inquired about switching trulicity to ozempic which we started on 4/2/20253, tolerating well; increased to 1mg in 5/7/2023, & then to 2mg on 8/4/2023. States she has been out the past month and getting shipment this week  She decided on her own not to take metformin last year. We are adding this back today due to her elevated A1C.  We started Jardiance in 11/2023. Tolerating well so we will increase dose today.  Glipizide was d/juanita back in 2022 when she first started Trulicity.   She is doing kickboxing.    A1C 9.9 today in clinic (up from 9.4)   - continue Semaglutie IJ 2mg Q7days;  - increase Jardiance to 25mg QD; using Bidet or wet wipes  - add back Metformin 1gm BID; she can start slow wean from 500mg QD  - recheck a1c in 3 months  - in dept conversation on dietary intake. She admits not watching her carbs " or sugar intake. She will do better         Relevant Medications    metformin (GLUCOPHAGE) 1000 MG tablet    Empagliflozin (JARDIANCE) 25 MG Tab    Semaglutide, 2 MG/DOSE, (OZEMPIC, 2 MG/DOSE,) 8 MG/3ML Solution Pen-injector    Other Relevant Orders    POCT  A1C (Completed)    CBC WITHOUT DIFFERENTIAL    Comp Metabolic Panel    HEMOGLOBIN A1C    Lipid Profile    TSH WITH REFLEX TO FT4    MICROALBUMIN CREAT RATIO URINE    Elevated cholesterol/high density lipoprotein ratio      Latest Reference Range & Units 03/31/23 11:37   Cholesterol,Tot 100 - 199 mg/dL 164   Triglycerides 0 - 149 mg/dL 79   HDL >=40 mg/dL 55   LDL <100 mg/dL 93   The 10-year ASCVD risk score (Erick GRAHAM, et al., 2019) is: 2.4%    Chronic; LDL goal < 100  Tolerating statin, no myalgia. Hx diabetes mellitus type 2 on 3 agents, hypertension on 2 agents  - continue Atorvastatin 20mg QHS  - monitor annual fasting lipid  - counseled on heart healthy diet, staying active with exercise         Relevant Medications    atorvastatin (LIPITOR) 20 MG Tab    Other Relevant Orders    Comp Metabolic Panel    Lipid Profile    Primary hypertension     Chronic; goal BP < 130/80  BP in clinic today 154/70; denies CP, palpitations, edema improved with HCTZ; she does not have home BP device;   , creat 0.51  -  increase lisinopril to 10mg QD,  - continue HCTZ 12.5mg QD to help with edema & BP management  - annual CMP/microalbum; ordered today         Relevant Medications    atorvastatin (LIPITOR) 20 MG Tab    lisinopril (PRINIVIL) 10 MG Tab    Other Relevant Orders    Comp Metabolic Panel    Lipid Profile    Abnormal Papanicolaou smear of cervix with positive human papilloma virus (HPV) test     2/2021 ASCUS on PAP with +HPV; colpo with Dr De Jesus in 3/2021, no cancer; last PAP with [facility] in 2023, normal. Patient would like to establish with different GYN group, new referral placed today          Relevant Orders    Referral to Gynecology     Vitamin D deficiency    Relevant Orders    VITAMIN D,25 HYDROXY (DEFICIENCY)       Medications Prescribed Today:  1. Type 2 diabetes mellitus without complication, without long-term current use of insulin (HCC)  - metformin (GLUCOPHAGE) 1000 MG tablet; Take 1 Tablet by mouth 2 times a day with meals.  Dispense: 180 Tablet; Refill: 3  - Empagliflozin (JARDIANCE) 25 MG Tab; Take 1 Tablet by mouth every day.  Dispense: 90 Tablet; Refill: 3  - Semaglutide, 2 MG/DOSE, (OZEMPIC, 2 MG/DOSE,) 8 MG/3ML Solution Pen-injector; Inject 2 mg under the skin every 7 days.  Dispense: 6 mL; Refill: 3    2. Primary hypertension  - lisinopril (PRINIVIL) 10 MG Tab; Take 1 Tablet by mouth every day.  Dispense: 90 Tablet; Refill: 3    3. Elevated cholesterol/high density lipoprotein ratio  - atorvastatin (LIPITOR) 20 MG Tab; Take 1 Tablet by mouth every day.  Dispense: 90 Tablet; Refill: 3    Educated in proper administration of medication(s) ordered today including safety, possible SE, risks, benefits, rationale and alternatives to therapy.     Return in about 4 months (around 7/29/2024) for establish care, follow up on DM2, HTN.    Please note that this dictation was created using voice recognition software. I have made every reasonable attempt to correct obvious errors, but I expect that there are errors of grammar and possibly content that I did not discover before finalizing the note.

## 2024-03-29 NOTE — ASSESSMENT & PLAN NOTE
Latest Reference Range & Units 03/31/23 11:37   Cholesterol,Tot 100 - 199 mg/dL 164   Triglycerides 0 - 149 mg/dL 79   HDL >=40 mg/dL 55   LDL <100 mg/dL 93   The 10-year ASCVD risk score (Erick GRAHAM, et al., 2019) is: 2.4%    Chronic; LDL goal < 100  Tolerating statin, no myalgia. Hx diabetes mellitus type 2 on 3 agents, hypertension on 2 agents  - continue Atorvastatin 20mg QHS  - monitor annual fasting lipid  - counseled on heart healthy diet, staying active with exercise

## 2024-03-29 NOTE — LETTER
[employer]  MARIZOL GrewalP.  [address]  [place] [address]  Fax: [phone]   Authorization for Release/Disclosure of   Protected Health Information   Name: MELISSA SIMONS : 1980 SSN: [id number]   Address: [address] [place] [address]   [place] [address] Phone:    [phone] (home)    I authorize the entity listed below to release/disclose the PHI below to:   [employer]/Belén Quiroz D.N.P. and Belén Quiroz D.N.P.   Provider or Entity Name:     Address   City, State, Zip   Phone:      Fax:     Reason for request: continuity of care   Information to be released:    [  ] LAST COLONOSCOPY,  including any PATH REPORT and follow-up  [  ] LAST FIT/COLOGUARD RESULT [  ] LAST DEXA  [  ] LAST MAMMOGRAM  [XX  ] LAST PAP  [  ] LAST LABS [  ] RETINA EXAM REPORT  [  ] IMMUNIZATION RECORDS  [  ] Release all info      [  ] Check here and initial the line next to each item to release ALL health information INCLUDING  _____ Care and treatment for drug and / or alcohol abuse  _____ HIV testing, infection status, or AIDS  _____ Genetic Testing    DATES OF SERVICE OR TIME PERIOD TO BE DISCLOSED: _____________  I understand and acknowledge that:  * This Authorization may be revoked at any time by you in writing, except if your health information has already been used or disclosed.  * Your health information that will be used or disclosed as a result of you signing this authorization could be re-disclosed by the recipient. If this occurs, your re-disclosed health information may no longer be protected by State or Federal laws.  * You may refuse to sign this Authorization. Your refusal will not affect your ability to obtain treatment.  * This Authorization becomes effective upon signing and will  on (date) __________.      If no date is indicated, this Authorization will  one (1) year from the signature date.    Name: Melissa Simons  Signature: Date:   3/29/2024     PLEASE FAX REQUESTED RECORDS BACK  TO: [phone]

## 2024-03-29 NOTE — ASSESSMENT & PLAN NOTE
2/2021 ASCUS on PAP with +HPV; colpo with Dr De Jesus in 3/2021, no cancer; last PAP with [facility] in 2023, normal. Patient would like to establish with different GYN group, new referral placed today

## 2024-03-29 NOTE — ASSESSMENT & PLAN NOTE
Chronic; goal BP < 130/80  BP in clinic today 154/70; denies CP, palpitations, edema improved with HCTZ; she does not have home BP device;   , creat 0.51  -  increase lisinopril to 10mg QD,  - continue HCTZ 12.5mg QD to help with edema & BP management  - annual CMP/microalbum; ordered today

## 2024-03-29 NOTE — ASSESSMENT & PLAN NOTE
Chronic; A1C goal < 6.5; with microalbuminuria  She no longer wishes to see pharm for management (last visit with [facility] was 12/28/2022). She inquired about switching trulicity to ozempic which we started on 4/2/20253, tolerating well; increased to 1mg in 5/7/2023, & then to 2mg on 8/4/2023. States she has been out the past month and getting shipment this week  She decided on her own not to take metformin last year. We are adding this back today due to her elevated A1C.  We started Jardiance in 11/2023. Tolerating well so we will increase dose today.  Glipizide was d/juanita back in 2022 when she first started Trulicity.   She is doing kickboxing.    A1C 9.9 today in clinic (up from 9.4)   - continue Semaglutie IJ 2mg Q7days;  - increase Jardiance to 25mg QD; using Bidet or wet wipes  - add back Metformin 1gm BID; she can start slow wean from 500mg QD  - recheck a1c in 3 months  - in dept conversation on dietary intake. She admits not watching her carbs or sugar intake. She will do better

## 2024-05-16 ENCOUNTER — DOCUMENTATION (OUTPATIENT)
Dept: HEALTH INFORMATION MANAGEMENT | Facility: OTHER | Age: 44
End: 2024-05-16
Payer: COMMERCIAL

## 2024-09-05 ENCOUNTER — OFFICE VISIT (OUTPATIENT)
Dept: MEDICAL GROUP | Facility: CLINIC | Age: 44
End: 2024-09-05
Payer: COMMERCIAL

## 2024-09-05 ENCOUNTER — HOSPITAL ENCOUNTER (OUTPATIENT)
Dept: LAB | Facility: MEDICAL CENTER | Age: 44
End: 2024-09-05
Attending: NURSE PRACTITIONER
Payer: COMMERCIAL

## 2024-09-05 VITALS
SYSTOLIC BLOOD PRESSURE: 122 MMHG | HEIGHT: 65 IN | TEMPERATURE: 97 F | WEIGHT: 277 LBS | BODY MASS INDEX: 46.15 KG/M2 | RESPIRATION RATE: 18 BRPM | HEART RATE: 71 BPM | DIASTOLIC BLOOD PRESSURE: 70 MMHG | OXYGEN SATURATION: 95 %

## 2024-09-05 DIAGNOSIS — I10 PRIMARY HYPERTENSION: ICD-10-CM

## 2024-09-05 DIAGNOSIS — M79.89 LEG SWELLING: ICD-10-CM

## 2024-09-05 DIAGNOSIS — E11.9 TYPE 2 DIABETES MELLITUS WITHOUT COMPLICATION, WITHOUT LONG-TERM CURRENT USE OF INSULIN (HCC): ICD-10-CM

## 2024-09-05 DIAGNOSIS — E55.9 VITAMIN D DEFICIENCY: ICD-10-CM

## 2024-09-05 DIAGNOSIS — R10.9 FLANK PAIN: ICD-10-CM

## 2024-09-05 DIAGNOSIS — E78.6 ELEVATED CHOLESTEROL/HIGH DENSITY LIPOPROTEIN RATIO: ICD-10-CM

## 2024-09-05 DIAGNOSIS — E11.9 TYPE 2 DIABETES MELLITUS WITHOUT COMPLICATION, WITHOUT LONG-TERM CURRENT USE OF INSULIN (HCC): Chronic | ICD-10-CM

## 2024-09-05 LAB
25(OH)D3 SERPL-MCNC: 32 NG/ML (ref 30–100)
ALBUMIN SERPL BCP-MCNC: 4 G/DL (ref 3.2–4.9)
ALBUMIN/GLOB SERPL: 1.2 G/DL
ALP SERPL-CCNC: 73 U/L (ref 30–99)
ALT SERPL-CCNC: 23 U/L (ref 2–50)
ANION GAP SERPL CALC-SCNC: 12 MMOL/L (ref 7–16)
APPEARANCE UR: ABNORMAL
AST SERPL-CCNC: 19 U/L (ref 12–45)
BILIRUB SERPL-MCNC: 0.4 MG/DL (ref 0.1–1.5)
BILIRUB UR STRIP-MCNC: NEGATIVE MG/DL
BUN SERPL-MCNC: 11 MG/DL (ref 8–22)
CALCIUM ALBUM COR SERPL-MCNC: 9.6 MG/DL (ref 8.5–10.5)
CALCIUM SERPL-MCNC: 9.6 MG/DL (ref 8.5–10.5)
CHLORIDE SERPL-SCNC: 98 MMOL/L (ref 96–112)
CHOLEST SERPL-MCNC: 181 MG/DL (ref 100–199)
CO2 SERPL-SCNC: 24 MMOL/L (ref 20–33)
COLOR UR AUTO: YELLOW
CREAT SERPL-MCNC: 0.43 MG/DL (ref 0.5–1.4)
ERYTHROCYTE [DISTWIDTH] IN BLOOD BY AUTOMATED COUNT: 45 FL (ref 35.9–50)
EST. AVERAGE GLUCOSE BLD GHB EST-MCNC: 232 MG/DL
GFR SERPLBLD CREATININE-BSD FMLA CKD-EPI: 121 ML/MIN/1.73 M 2
GLOBULIN SER CALC-MCNC: 3.3 G/DL (ref 1.9–3.5)
GLUCOSE SERPL-MCNC: 199 MG/DL (ref 65–99)
GLUCOSE UR STRIP.AUTO-MCNC: NEGATIVE MG/DL
HBA1C MFR BLD: 9.7 % (ref 4–5.6)
HCT VFR BLD AUTO: 43.7 % (ref 37–47)
HDLC SERPL-MCNC: 68 MG/DL
HGB BLD-MCNC: 13.8 G/DL (ref 12–16)
KETONES UR STRIP.AUTO-MCNC: NEGATIVE MG/DL
LDLC SERPL CALC-MCNC: 82 MG/DL
LEUKOCYTE ESTERASE UR QL STRIP.AUTO: NEGATIVE
MCH RBC QN AUTO: 27.2 PG (ref 27–33)
MCHC RBC AUTO-ENTMCNC: 31.6 G/DL (ref 32.2–35.5)
MCV RBC AUTO: 86 FL (ref 81.4–97.8)
NITRITE UR QL STRIP.AUTO: NEGATIVE
PH UR STRIP.AUTO: 5.5 [PH] (ref 5–8)
PLATELET # BLD AUTO: 295 K/UL (ref 164–446)
PMV BLD AUTO: 10.1 FL (ref 9–12.9)
POTASSIUM SERPL-SCNC: 3.9 MMOL/L (ref 3.6–5.5)
PROT SERPL-MCNC: 7.3 G/DL (ref 6–8.2)
PROT UR QL STRIP: ABNORMAL MG/DL
RBC # BLD AUTO: 5.08 M/UL (ref 4.2–5.4)
RBC UR QL AUTO: NEGATIVE
SODIUM SERPL-SCNC: 134 MMOL/L (ref 135–145)
SP GR UR STRIP.AUTO: 1.03
TRIGL SERPL-MCNC: 153 MG/DL (ref 0–149)
TSH SERPL DL<=0.005 MIU/L-ACNC: 1.21 UIU/ML (ref 0.38–5.33)
UROBILINOGEN UR STRIP-MCNC: 0.2 MG/DL
WBC # BLD AUTO: 7.7 K/UL (ref 4.8–10.8)

## 2024-09-05 PROCEDURE — 82306 VITAMIN D 25 HYDROXY: CPT

## 2024-09-05 PROCEDURE — 80053 COMPREHEN METABOLIC PANEL: CPT

## 2024-09-05 PROCEDURE — 85027 COMPLETE CBC AUTOMATED: CPT

## 2024-09-05 PROCEDURE — 99204 OFFICE O/P NEW MOD 45 MIN: CPT | Mod: GC

## 2024-09-05 PROCEDURE — 84443 ASSAY THYROID STIM HORMONE: CPT

## 2024-09-05 PROCEDURE — 81002 URINALYSIS NONAUTO W/O SCOPE: CPT | Mod: GC

## 2024-09-05 PROCEDURE — 80061 LIPID PANEL: CPT

## 2024-09-05 PROCEDURE — 36415 COLL VENOUS BLD VENIPUNCTURE: CPT

## 2024-09-05 PROCEDURE — 83036 HEMOGLOBIN GLYCOSYLATED A1C: CPT

## 2024-09-05 RX ORDER — SEMAGLUTIDE 2.68 MG/ML
2 INJECTION, SOLUTION SUBCUTANEOUS
Qty: 6 ML | Refills: 3 | Status: SHIPPED | OUTPATIENT
Start: 2024-09-05

## 2024-09-05 ASSESSMENT — FIBROSIS 4 INDEX: FIB4 SCORE: 1.19

## 2024-09-05 NOTE — PROGRESS NOTES
Subjective:     CC: left flank pain, to establish care     HISTORY OF THE PRESENT ILLNESS: Patient is a 44 y.o. female. This pleasant patient is here today to establish care and discuss left flank pain and bilateral leg swelling. Patient reports that she recently began having left flank pain. First noticed it while sitting at work. No recent back injury, does have history of left sided sciatica pain. Previous left kidney stone years ago. No associated fever or chills, maybe mild dysuria.     Pt also have been having bilateral feet swelling. She is on lisinopril and hctz. No recent travel. Does not wear compression stockings or elevate feet to help with swelling.     Patient also reports she is compliant with taking metformin as well as Jardiance for type 2 diabetes.  She states she finds it difficult to fill Ozempic prescription due to availability at pharmacy.  She continues to have an elevated hemoglobin A1c, her most recent was in March and was greater than 9.    Current Outpatient Medications Ordered in Epic   Medication Sig Dispense Refill    metformin (GLUCOPHAGE) 1000 MG tablet Take 1 Tablet by mouth 2 times a day with meals. 180 Tablet 3    atorvastatin (LIPITOR) 20 MG Tab Take 1 Tablet by mouth every day. 90 Tablet 3    lisinopril (PRINIVIL) 10 MG Tab Take 1 Tablet by mouth every day. 90 Tablet 3    Empagliflozin (JARDIANCE) 25 MG Tab Take 1 Tablet by mouth every day. 90 Tablet 3    Semaglutide, 2 MG/DOSE, (OZEMPIC, 2 MG/DOSE,) 8 MG/3ML Solution Pen-injector Inject 2 mg under the skin every 7 days. 6 mL 3    albuterol 108 (90 Base) MCG/ACT Aero Soln inhalation aerosol Inhale 2 Puffs every four hours as needed for Shortness of Breath. 1 Each 2    budesonide-formoterol (SYMBICORT) 80-4.5 MCG/ACT Aerosol Inhale 1 Puff 2 times a day. May also inhale 1 Puff every four hours as needed (cough, wheezing). 1 Each 3    hydroCHLOROthiazide (HYDRODIURIL) 12.5 MG tablet Take 1 Tablet by mouth every day. 90 Tablet 3     "Multiple Vitamin (MULTIVITAMIN ADULT PO) Take  by mouth.      Lancets Lancets order: Lancets for f Insurance Preference. meter. Sig: use BID and prn ssx high or low sugar. #100 RF x 3 200 Each 0    Blood Glucose Monitoring Suppl Device Meter: Dispense Device of Insurance Preference. Sig. Use as directed for blood sugar monitoring. #1. NR. 1 Each 0    VITAMIN D-VITAMIN K PO Take 5,000 Units by mouth.      Probiotic Product (PROBIOTIC DAILY PO) Take  by mouth.       No current Epic-ordered facility-administered medications on file.     Objective:     Exam: Ht 1.651 m (5' 5\")   Wt (!) 126 kg (277 lb)  Body mass index is 46.1 kg/m².    General: Normal appearing. No distress.  HEENT: Normocephalic. Eyes conjunctiva clear lids without ptosis, pupils equal and reactive to light accommodation, ears normal shape and contour, canals are clear bilaterally, .   Pulmonary: Clear to ausculation.  Normal effort. No rales, ronchi, or wheezing.  Cardiovascular: Regular rate and rhythm without murmur. Carotid and radial pulses are intact and equal bilaterally.  Abdomen: Soft, nontender, nondistended. Normal bowel sounds. Liver and spleen are not palpable  Neurologic: Grossly nonfocal  Skin: Warm and dry.  No obvious lesions.  Musculoskeletal: No midline or paraspinal lumbar tenderness, no CVA tenderness bilaterally, normal gait. No extremity cyanosis, clubbing, or edema.    Assessment & Plan:   44 y.o. female with the following -    1. Type 2 diabetes mellitus without complication, without long-term current use of insulin ([facility])  Patient with history of type 2 diabetes not on insulin.  Patient reports that she has been compliant with medication of metformin 1000 mg twice daily as well as Jardiance 25 mg daily.  Has been having a difficult time filling her Ozempic prescription.  Patient previously was on Ozempic for her ear and did not feel this was helping with her weight loss or appetite suppression.  She has not had Ozempic in over " a month.  Will get repeat hemoglobin A1c testing and will have patient retry Ozempic prescription on a consistent basis and follow-up if she continues to not feel improvement with this medication.  Monofilament test was also performed and was normal.  - Diabetic Monofilament LE Exam  - Semaglutide, 2 MG/DOSE, (OZEMPIC, 2 MG/DOSE,) 8 MG/3ML Solution Pen-injector; Inject 2 mg under the skin every 7 days.  Dispense: 6 mL; Refill: 3    2. Flank pain  Patient complaining of new onset left-sided flank pain.  Has previous chronic left-sided sciatic pain.  No new injury.  No CVA tenderness on exam.  UA was performed and did not indicate hematuria concerning for kidney stone.  Patient likely has a pulled muscle.  Reviewed taking short course of ibuprofen as well as heat, ice, and stretching of the affected area.  If patient feels that she is developing fever or chills or dysuria patient is to follow-up for further evaluation  - POCT Urinalysis    3. Leg swelling  Patient is complaining of bilateral chronic leg swelling.  On exam she has nonpitting edema both legs up to her ankles.  She does take lisinopril and HCTZ for elevated blood pressure but does not have a history of heart failure.  Given edema is not pitting low concern this is heart failure related.  Likely is chronic venous insufficiency.  Discussed with patient to wear compression socks as well as elevate the legs.  Patient also has a pending CMP to review for renal issues.  Will follow-up once labs have been completed.    Follow up once labs have been completed.     Gay Umana M.D.   PGY-3 [employer]

## 2024-09-24 ENCOUNTER — APPOINTMENT (RX ONLY)
Dept: URBAN - METROPOLITAN AREA CLINIC 20 | Facility: CLINIC | Age: 44
Setting detail: DERMATOLOGY
End: 2024-09-24

## 2025-01-23 ENCOUNTER — HOSPITAL ENCOUNTER (OUTPATIENT)
Dept: RADIOLOGY | Facility: MEDICAL CENTER | Age: 45
End: 2025-01-23
Payer: COMMERCIAL

## 2025-01-23 DIAGNOSIS — Z12.31 VISIT FOR SCREENING MAMMOGRAM: ICD-10-CM

## 2025-01-23 PROCEDURE — 77067 SCR MAMMO BI INCL CAD: CPT

## 2025-04-21 ENCOUNTER — HOSPITAL ENCOUNTER (OUTPATIENT)
Facility: MEDICAL CENTER | Age: 45
End: 2025-04-21
Attending: STUDENT IN AN ORGANIZED HEALTH CARE EDUCATION/TRAINING PROGRAM
Payer: COMMERCIAL

## 2025-04-21 ENCOUNTER — OFFICE VISIT (OUTPATIENT)
Dept: MEDICAL GROUP | Facility: CLINIC | Age: 45
End: 2025-04-21
Payer: COMMERCIAL

## 2025-04-21 VITALS
BODY MASS INDEX: 46 KG/M2 | TEMPERATURE: 96.5 F | SYSTOLIC BLOOD PRESSURE: 141 MMHG | OXYGEN SATURATION: 93 % | RESPIRATION RATE: 16 BRPM | HEART RATE: 92 BPM | WEIGHT: 276.1 LBS | DIASTOLIC BLOOD PRESSURE: 83 MMHG | HEIGHT: 65 IN

## 2025-04-21 DIAGNOSIS — E11.65 TYPE 2 DIABETES MELLITUS WITH HYPERGLYCEMIA, WITHOUT LONG-TERM CURRENT USE OF INSULIN (HCC): ICD-10-CM

## 2025-04-21 DIAGNOSIS — I10 PRIMARY HYPERTENSION: ICD-10-CM

## 2025-04-21 DIAGNOSIS — E11.9 TYPE 2 DIABETES MELLITUS WITHOUT COMPLICATION, WITHOUT LONG-TERM CURRENT USE OF INSULIN (HCC): ICD-10-CM

## 2025-04-21 DIAGNOSIS — E66.813 CLASS 3 SEVERE OBESITY WITH SERIOUS COMORBIDITY AND BODY MASS INDEX (BMI) OF 45.0 TO 49.9 IN ADULT, UNSPECIFIED OBESITY TYPE: ICD-10-CM

## 2025-04-21 DIAGNOSIS — R06.02 SOB (SHORTNESS OF BREATH): ICD-10-CM

## 2025-04-21 DIAGNOSIS — R05.9 COUGH, UNSPECIFIED TYPE: ICD-10-CM

## 2025-04-21 LAB
HBA1C MFR BLD: 11.2 % (ref ?–5.8)
POCT INT CON NEG: NEGATIVE
POCT INT CON POS: POSITIVE

## 2025-04-21 PROCEDURE — 3077F SYST BP >= 140 MM HG: CPT | Performed by: STUDENT IN AN ORGANIZED HEALTH CARE EDUCATION/TRAINING PROGRAM

## 2025-04-21 PROCEDURE — 82043 UR ALBUMIN QUANTITATIVE: CPT

## 2025-04-21 PROCEDURE — 3079F DIAST BP 80-89 MM HG: CPT | Performed by: STUDENT IN AN ORGANIZED HEALTH CARE EDUCATION/TRAINING PROGRAM

## 2025-04-21 PROCEDURE — RXMED WILLOW AMBULATORY MEDICATION CHARGE: Performed by: STUDENT IN AN ORGANIZED HEALTH CARE EDUCATION/TRAINING PROGRAM

## 2025-04-21 PROCEDURE — 99215 OFFICE O/P EST HI 40 MIN: CPT | Performed by: STUDENT IN AN ORGANIZED HEALTH CARE EDUCATION/TRAINING PROGRAM

## 2025-04-21 PROCEDURE — 82570 ASSAY OF URINE CREATININE: CPT

## 2025-04-21 PROCEDURE — 83036 HEMOGLOBIN GLYCOSYLATED A1C: CPT | Performed by: STUDENT IN AN ORGANIZED HEALTH CARE EDUCATION/TRAINING PROGRAM

## 2025-04-21 RX ORDER — NALTREXONE HYDROCHLORIDE 50 MG/1
50 TABLET, FILM COATED ORAL DAILY
Qty: 30 TABLET | Refills: 11 | Status: SHIPPED | OUTPATIENT
Start: 2025-04-21

## 2025-04-21 RX ORDER — VALSARTAN 80 MG/1
80 TABLET ORAL DAILY
Qty: 100 TABLET | Refills: 3 | Status: SHIPPED | OUTPATIENT
Start: 2025-04-21 | End: 2026-05-26

## 2025-04-21 ASSESSMENT — PATIENT HEALTH QUESTIONNAIRE - PHQ9: CLINICAL INTERPRETATION OF PHQ2 SCORE: 0

## 2025-04-21 ASSESSMENT — FIBROSIS 4 INDEX: FIB4 SCORE: 0.63

## 2025-04-21 NOTE — PROGRESS NOTES
Family Medicine Clinic Note    Date: 4/21/2025    PPE used by provider during encounter: surgical mask  Consent to use AI Scribe obtained - currently using Nabla    ASSESSMENT & PLAN    1. Type 2 diabetes mellitus with hyperglycemia, without long-term current use of insulin (HCC)  Longstanding type 2 diabetes with hyperglycemia.  A1c levels have been high 9.99.7.  Medication use has been inconsistent due to supply issues and cost.  Has been on Ozempic now for 2 months at the 2 mg dose.  Patient does not find any benefit in terms of her hunger and has not experienced any weight loss.  Is interested in switching to tirzepatide. Will submit prior auth. Has not had A1c checked when consistently using ozempic. Plan to continue ozempic while we get prior auth, taking metformin daily - setting reminder. Short interval follow up with fasting glucose log. Discussed insulin today to get A1c down below 10 to start sglt2 inhibitor pt would like to wait.    - POCT A1C  - Microalbumin Creat Ratio Urine - Clinic Collect; Future  - Referral to Nutrition Services  - naltrexone (DEPADE) 50 MG Tab; Take 1 Tablet by mouth every day.  Dispense: 30 Tablet; Refill: 11  - Blood Glucose Meter Kit; Test blood sugar as recommended by provider. Blood glucose monitoring kit.  Dispense: 1 Kit; Refill: 0  - Blood Glucose Test Strips; Use one test strip to test blood sugar once daily early morning before first meal.  Dispense: 100 Strip; Refill: 3  - Lancets; Use one lancet to test blood sugar once daily early morning before first meal.  Dispense: 100 Each; Refill: 0  - Alcohol Swabs; Wipe site with prep pad prior to injection.  Dispense: 100 Each; Refill: 0  - Tirzepatide 2.5 MG/0.5ML Solution Auto-injector; Inject 2.5 mg under the skin every 7 days.  Dispense: 2 mL; Refill: 0    2. Cough, unspecified type  Sounds like lisinopril reaction. Will trial changing to arb. Lungs clear on exam, no recent illness. If does not improve will get chest  xray.    3. Primary hypertension  - valsartan (DIOVAN) 80 MG Tab; Take 1 Tablet by mouth every day.  Dispense: 100 Tablet; Refill: 3    4. SOB (shortness of breath)  No lower extremity edema, lungs clear. Will get echo as it has been a few weeks to months.  - EC-ECHOCARDIOGRAM COMPLETE W/O CONT; Future    5. Class 3 severe obesity with serious comorbidity and body mass index (BMI) of 45.0 to 49.9 in adult, unspecified obesity type  Bariatric surgery not covered by insurance. Discussed naltrexone to reduce food cravings and cut back on etoh use. Can consider bupropion in future as additional agent. Open to dietary referral.  - Referral to Nutrition Services  - naltrexone (DEPADE) 50 MG Tab; Take 1 Tablet by mouth every day.  Dispense: 30 Tablet; Refill: 11       Follow up 4 weeks.    SUBJECTIVE    Chief Complaint   Patient presents with    [facility] Care    Diabetes       Marisabel is a 45 y.o. person presenting today with the following concerns:      Past Medical History  - Type 2 diabetes mellitus for about 18 years, initially diagnosed during pregnancy.  - Obesity.     Family History  - Family history of diabetes, with multiple family members affected.     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  - Consumes alcohol on weekends, about 3-4 drinks, typically tequila and soda.     Medications  - Currently prescribed ozempic, jardiance, metformin, lisinopril, atorvastatin, and hydrochlorothiazide.  - Reports sporadic access to ozempic due to pharmacy stock issues, leading to inconsistent use.  - Experienced side effects such as nausea and vomiting when restarting ozempic after a break.  - Has been on jardiance consistently until recently due to cost issues; reports it costs $135 for a 3-month supply.  - Metformin is taken 1000 mg twice a day, but sometimes forgets the nighttime dose.  - Reports some improvement in A1c levels when medication is taken consistently, but recent levels  were 9.9 and 9.7.  - Experienced yeast infections and UTIs when starting jardiance initially.     Diabetes Management  - Has been dealing with diabetes for about 18 years, initially diagnosed during pregnancy.  - Family history of diabetes, with multiple family members affected.  - Currently prescribed ozempic, jardiance, metformin, lisinopril, atorvastatin, and hydrochlorothiazide.  - Reports sporadic access to ozempic due to pharmacy stock issues, leading to inconsistent use.  - Experienced side effects such as nausea and vomiting when restarting ozempic after a break.  - Has been on jardiance consistently until recently due to cost issues; reports it costs $135 for a 3-month supply.  - Metformin is taken 1000 mg twice a day, but sometimes forgets the nighttime dose.  - Reports some improvement in A1c levels when medication is taken consistently, but recent levels were 9.9 and 9.7.  - Experienced yeast infections and UTIs when starting jardiance initially.     Weight Management  - Does not see benefits in weight loss or appetite control from ozempic.  - Diet is not optimal, with better control during the week but less so on weekends due to social gatherings and family cooking responsibilities.  - Has consulted with a dietician previously, interested again  - Interested in bariatric surgery but insurance does not cover it; had a surgery date scheduled 15 years ago but got pregnant.  - Considers changing jobs for better health coverage.     Respiratory Symptoms  - Reports a persistent dry cough that is not phlegmy, described as nagging and leading to coughing fits.  - Experiences shortness of breath when climbing a flight of stairs, a recent development over the past few months.  - Concerned about shortness of breath due to a family history of congestive heart failure, as her uncle was recently diagnosed.    OBJECTIVE  BP (!) 141/83 (BP Location: Left arm, Patient Position: Sitting, BP Cuff Size: Large adult)    "Pulse 92   Temp 35.8 °C (96.5 °F) (Temporal)   Resp 16   Ht 1.638 m (5' 4.5\")   Wt (!) 125 kg (276 lb 1.6 oz)   SpO2 93%   BMI 46.66 kg/m²      General: Well appearing, NAD  Heart: RRR  Lungs: breathing comfortably, CAB no crackles or wheezing  Neuro: 2-12 grossly intact  Ext: no edema    Total time I spent in care of this patient today (excluding time spent on other billable services) was 43 minutes.    César Arevalo MD   Family and Community Medicine  [facility]  [facility]    "

## 2025-04-21 NOTE — PATIENT INSTRUCTIONS
Naltrexone - start with 1/2 tablet daily. If getting headaches take 1/4 tablet for 1 week and then increase to 1/2 tablet. After 2 weeks can take full tablet    Switch from lisinopril to olmesartan    I will work to try and get mounjaro instead of ozempic    Set an alarm for your metformin for evening    I will order new blood sugar monitor - need to check fasting every day and record    Follow up 4 weeks and bring the log    Ultrasound of heart

## 2025-04-22 LAB
CREAT UR-MCNC: 160 MG/DL
MICROALBUMIN UR-MCNC: 51.1 MG/DL
MICROALBUMIN/CREAT UR: 319 MG/G (ref 0–30)

## 2025-04-23 ENCOUNTER — RESULTS FOLLOW-UP (OUTPATIENT)
Dept: MEDICAL GROUP | Facility: CLINIC | Age: 45
End: 2025-04-23
Payer: COMMERCIAL

## 2025-04-23 PROCEDURE — RXMED WILLOW AMBULATORY MEDICATION CHARGE: Performed by: STUDENT IN AN ORGANIZED HEALTH CARE EDUCATION/TRAINING PROGRAM

## 2025-04-23 RX ORDER — DIPHENHYDRAMINE HYDROCHLORIDE 25 MG/1
CAPSULE, LIQUID FILLED ORAL
Qty: 1 KIT | Refills: 0 | Status: SHIPPED | OUTPATIENT
Start: 2025-04-23

## 2025-04-23 RX ORDER — GLUCOSAMINE HCL/CHONDROITIN SU 500-400 MG
CAPSULE ORAL
Qty: 100 EACH | Refills: 0 | Status: SHIPPED | OUTPATIENT
Start: 2025-04-23

## 2025-04-23 RX ORDER — AVOBENZONE, HOMOSALATE, OCTISALATE, OCTOCRYLENE 30; 40; 45; 26 MG/ML; MG/ML; MG/ML; MG/ML
CREAM TOPICAL
Qty: 100 EACH | Refills: 0 | Status: SHIPPED | OUTPATIENT
Start: 2025-04-23

## 2025-04-28 ENCOUNTER — PHARMACY VISIT (OUTPATIENT)
Dept: PHARMACY | Facility: MEDICAL CENTER | Age: 45
End: 2025-04-28
Payer: COMMERCIAL

## 2025-04-28 NOTE — Clinical Note
REFERRAL APPROVAL NOTICE         Sent on April 28, 2025                   Melissa Simons  [address] [place] [address] [place] [address]                   Dear Ms. Simons,    After a careful review of the medical information and benefit coverage, [facility] has processed your referral. See below for additional details.    If applicable, you must be actively enrolled with your insurance for coverage of the authorized service. If you have any questions regarding your coverage, please contact your insurance directly.    REFERRAL INFORMATION   Referral #:  [id number]  Referred-To Department    Referred-By Provider:  Gisel Arevalo M.D.   [facility]      [address]  [place] [id number]  [phone] [address]  [place] [id number] [phone]    Referral Start Date:  04/21/2025  Referral End Date:   04/21/2026             SCHEDULING  If you do not already have an appointment, please call [phone] to make an appointment.     MORE INFORMATION  If you do not already have a [employer] account, sign up at: [email or web address]  You can access your medical information, make appointments, see lab results, billing information, and more.  If you have questions regarding this referral, please contact  the [facility] Referrals department at:             [phone]. Monday - Friday 8:00AM - 5:00PM.     Sincerely,    [facility]

## 2025-05-13 ENCOUNTER — HOSPITAL ENCOUNTER (OUTPATIENT)
Dept: CARDIOLOGY | Facility: MEDICAL CENTER | Age: 45
End: 2025-05-13
Attending: STUDENT IN AN ORGANIZED HEALTH CARE EDUCATION/TRAINING PROGRAM
Payer: COMMERCIAL

## 2025-05-13 DIAGNOSIS — R06.02 SOB (SHORTNESS OF BREATH): ICD-10-CM

## 2025-05-13 LAB
LV EJECT FRACT MOD 2C 99903: 72.16
LV EJECT FRACT MOD 4C 99902: 60.07
LV EJECT FRACT MOD BP 99901: 67.26

## 2025-05-13 PROCEDURE — 93306 TTE W/DOPPLER COMPLETE: CPT

## 2025-05-13 PROCEDURE — 93306 TTE W/DOPPLER COMPLETE: CPT | Mod: 26 | Performed by: INTERNAL MEDICINE

## 2025-05-14 ENCOUNTER — RESULTS FOLLOW-UP (OUTPATIENT)
Dept: MEDICAL GROUP | Facility: CLINIC | Age: 45
End: 2025-05-14
Payer: COMMERCIAL

## 2025-05-19 ENCOUNTER — APPOINTMENT (OUTPATIENT)
Dept: MEDICAL GROUP | Facility: CLINIC | Age: 45
End: 2025-05-19
Payer: COMMERCIAL

## 2025-05-19 VITALS
SYSTOLIC BLOOD PRESSURE: 141 MMHG | OXYGEN SATURATION: 93 % | HEIGHT: 65 IN | HEART RATE: 77 BPM | DIASTOLIC BLOOD PRESSURE: 81 MMHG | BODY MASS INDEX: 45.98 KG/M2 | TEMPERATURE: 96.9 F | WEIGHT: 276 LBS | RESPIRATION RATE: 16 BRPM

## 2025-05-19 DIAGNOSIS — E11.65 TYPE 2 DIABETES MELLITUS WITH HYPERGLYCEMIA, WITHOUT LONG-TERM CURRENT USE OF INSULIN (HCC): Primary | ICD-10-CM

## 2025-05-19 DIAGNOSIS — Z12.12 SCREENING FOR COLORECTAL CANCER: ICD-10-CM

## 2025-05-19 DIAGNOSIS — J45.20 MILD INTERMITTENT ASTHMA WITHOUT COMPLICATION: ICD-10-CM

## 2025-05-19 DIAGNOSIS — Z12.11 SCREENING FOR COLORECTAL CANCER: ICD-10-CM

## 2025-05-19 DIAGNOSIS — I10 PRIMARY HYPERTENSION: ICD-10-CM

## 2025-05-19 PROCEDURE — 99214 OFFICE O/P EST MOD 30 MIN: CPT | Performed by: STUDENT IN AN ORGANIZED HEALTH CARE EDUCATION/TRAINING PROGRAM

## 2025-05-19 PROCEDURE — RXMED WILLOW AMBULATORY MEDICATION CHARGE: Performed by: STUDENT IN AN ORGANIZED HEALTH CARE EDUCATION/TRAINING PROGRAM

## 2025-05-19 PROCEDURE — 3079F DIAST BP 80-89 MM HG: CPT | Performed by: STUDENT IN AN ORGANIZED HEALTH CARE EDUCATION/TRAINING PROGRAM

## 2025-05-19 PROCEDURE — 3077F SYST BP >= 140 MM HG: CPT | Performed by: STUDENT IN AN ORGANIZED HEALTH CARE EDUCATION/TRAINING PROGRAM

## 2025-05-19 RX ORDER — TIRZEPATIDE 5 MG/.5ML
5 INJECTION, SOLUTION SUBCUTANEOUS
Qty: 2 ML | Refills: 1 | Status: SHIPPED | OUTPATIENT
Start: 2025-05-19

## 2025-05-19 RX ORDER — VALSARTAN 160 MG/1
160 TABLET ORAL DAILY
Qty: 100 TABLET | Refills: 3 | Status: SHIPPED | OUTPATIENT
Start: 2025-05-19 | End: 2026-06-23

## 2025-05-19 RX ORDER — BUDESONIDE AND FORMOTEROL FUMARATE DIHYDRATE 80; 4.5 UG/1; UG/1
AEROSOL RESPIRATORY (INHALATION)
Qty: 10.2 G | Refills: 3 | Status: SHIPPED | OUTPATIENT
Start: 2025-05-19

## 2025-05-19 RX ORDER — ALBUTEROL SULFATE 90 UG/1
2 INHALANT RESPIRATORY (INHALATION) EVERY 4 HOURS PRN
Qty: 18 G | Refills: 2 | Status: SHIPPED | OUTPATIENT
Start: 2025-05-19

## 2025-05-19 ASSESSMENT — FIBROSIS 4 INDEX: FIB4 SCORE: 0.63

## 2025-05-19 NOTE — PROGRESS NOTES
Family Medicine Clinic Note    Date: 5/19/2025    PPE used by provider during encounter: surgical mask      ASSESSMENT & PLAN    1. Type 2 diabetes mellitus with hyperglycemia, without long-term current use of insulin ([facility])  Patient tolerating well, no side effects. Fasting sugars have improved with lifestyle changes. Optimistic that we will be able to get great glucose control. Increasing mounjaro today.   - Tirzepatide (MOUNJARO) 5 MG/0.5ML Solution Auto-injector; Inject 5 mg under the skin every 7 days.  Dispense: 2 mL; Refill: 1  - metformin (GLUCOPHAGE) 1000 MG tablet; Take 1 Tablet by mouth 2 times a day with meals.  Dispense: 180 Tablet; Refill: 3    2. Screening for colorectal cancer  - Referral to GI for Colonoscopy    3. Mild intermittent asthma without complication  Out of albuterol inhaler and symbicort. Will refill both, symbicort may need prior auth.  - albuterol 108 (90 Base) MCG/ACT Aero Soln inhalation aerosol; Inhale 2 Puffs every four hours as needed for Shortness of Breath.  Dispense: 18 g; Refill: 2  - budesonide-formoterol (SYMBICORT) 80-4.5 MCG/ACT Aerosol; Inhale 1 Puff 2 times a day. May also inhale 1 Puff every four hours as needed (cough, wheezing).  Dispense: 10.2 g; Refill: 3    4. Primary hypertension  Pressure still mildly elevated, taking hctz and valsartan toelrating well. Will increase to 160mg. Weight loss will also help with pressure.   - valsartan (DIOVAN) 160 MG Tab; Take 1 Tablet by mouth every day.  Dispense: 100 Tablet; Refill: 3         Follow up 4 weeks for pap/mounjaro increase  8 weeks for mounjaro increase      SUBJECTIVE    Chief Complaint   Patient presents with    Follow-Up     Dm meds    Lab Results     trish Petit is a 45 y.o. person presenting today with the following concerns:    Cough  Resolved  No longer having sob - thinks was from illness  Echo normal    Tirzepatide  No side effects  Still taking metformin    Sugars have been fasting  This morning  "239  Average has been 181    Tolerating naltrexone well      OBJECTIVE  BP (!) 141/81 (BP Location: Right arm, Patient Position: Sitting, BP Cuff Size: Large adult)   Pulse 77   Temp 36.1 °C (96.9 °F) (Temporal)   Resp 16   Ht 1.638 m (5' 4.5\")   Wt (!) 125 kg (276 lb)   SpO2 93%   BMI 46.64 kg/m²      General: Well appearing, NAD  Lungs: breathing comfortably, CAB  Neuro: 2-12 grossly intact  Monofilament testing with a 10 gram force: sensation intact: intact bilaterally  Visual Inspection: Feet without maceration, ulcers, fissures.  Pedal pulses: intact bilaterally        César Arevalo MD   Family and [facility]  [facility]    "

## 2025-05-19 NOTE — PATIENT INSTRUCTIONS
Take 160mg of valsartan (2 of the 80mg pills) until you get the refill  Increasing mounjaro to the 5mg weekly  Follow up 4 weeks for pap/mounjaro increase  8 weeks follow up with me

## 2025-05-23 ENCOUNTER — PHARMACY VISIT (OUTPATIENT)
Dept: PHARMACY | Facility: MEDICAL CENTER | Age: 45
End: 2025-05-23
Payer: COMMERCIAL

## 2025-05-23 NOTE — Clinical Note
REFERRAL APPROVAL NOTICE         Sent on May 23, 2025                   Melissa Simons  [address] [place] [address] [place] [address]                   Dear MsKraig GrijalvaSimons,    After a careful review of the medical information and benefit coverage, [facility] has processed your referral. See below for additional details.    If applicable, you must be actively enrolled with your insurance for coverage of the authorized service. If you have any questions regarding your coverage, please contact your insurance directly.    REFERRAL INFORMATION   Referral #:  [id number]  Referred-To Provider    Referred-By Provider:  Gastroenterology    César Arevalo M.D.   [facility]      [address] [place] [address]  [phone] [address]  [place] [id number]  [phone]    Referral Start Date:  05/19/2025  Referral End Date:   05/19/2026             SCHEDULING  If you do not already have an appointment, please call [phone] to make an appointment.     MORE INFORMATION  If you do not already have a [employer] account, sign up at: [email or web address]  You can access your medical information, make appointments, see lab results, billing information, and more.  If you have questions regarding this referral, please contact  the [facility] Referrals department at:             [phone]. Monday - Friday 8:00AM - 5:00PM.     Sincerely,    [facility]

## 2025-06-01 PROCEDURE — RXMED WILLOW AMBULATORY MEDICATION CHARGE: Performed by: STUDENT IN AN ORGANIZED HEALTH CARE EDUCATION/TRAINING PROGRAM

## 2025-06-10 ENCOUNTER — PATIENT MESSAGE (OUTPATIENT)
Dept: MEDICAL GROUP | Facility: CLINIC | Age: 45
End: 2025-06-10
Payer: COMMERCIAL

## 2025-06-10 DIAGNOSIS — E11.65 TYPE 2 DIABETES MELLITUS WITH HYPERGLYCEMIA, WITHOUT LONG-TERM CURRENT USE OF INSULIN (HCC): Primary | ICD-10-CM

## 2025-06-11 ENCOUNTER — PHARMACY VISIT (OUTPATIENT)
Dept: PHARMACY | Facility: MEDICAL CENTER | Age: 45
End: 2025-06-11
Payer: COMMERCIAL

## 2025-06-14 NOTE — PROGRESS NOTES
Tolerating 5mg, will increase to 7.5mg. Has follow up scheduled.    1. Type 2 diabetes mellitus with hyperglycemia, without long-term current use of insulin (HCC)  - Tirzepatide (MOUNJARO) 7.5 MG/0.5ML Solution Auto-injector; Inject 7.5 mg under the skin every 7 days.  Dispense: 2 mL; Refill: 1      César Arevalo MD   Family and [facility]  [facility]

## 2025-06-16 PROCEDURE — RXMED WILLOW AMBULATORY MEDICATION CHARGE: Performed by: STUDENT IN AN ORGANIZED HEALTH CARE EDUCATION/TRAINING PROGRAM

## 2025-06-20 ENCOUNTER — PATIENT MESSAGE (OUTPATIENT)
Dept: MEDICAL GROUP | Facility: CLINIC | Age: 45
End: 2025-06-20
Payer: COMMERCIAL

## 2025-06-20 ENCOUNTER — PHARMACY VISIT (OUTPATIENT)
Dept: PHARMACY | Facility: MEDICAL CENTER | Age: 45
End: 2025-06-20
Payer: COMMERCIAL

## 2025-06-23 ENCOUNTER — APPOINTMENT (OUTPATIENT)
Dept: MEDICAL GROUP | Facility: CLINIC | Age: 45
End: 2025-06-23
Payer: COMMERCIAL

## 2025-07-01 DIAGNOSIS — Z13.79 GENETIC SCREENING: Primary | ICD-10-CM

## 2025-07-08 PROCEDURE — RXMED WILLOW AMBULATORY MEDICATION CHARGE: Performed by: STUDENT IN AN ORGANIZED HEALTH CARE EDUCATION/TRAINING PROGRAM

## 2025-07-08 SDOH — HEALTH STABILITY: MENTAL HEALTH
STRESS IS WHEN SOMEONE FEELS TENSE, NERVOUS, ANXIOUS, OR CAN'T SLEEP AT NIGHT BECAUSE THEIR MIND IS TROUBLED. HOW STRESSED ARE YOU?: ONLY A LITTLE

## 2025-07-08 SDOH — ECONOMIC STABILITY: TRANSPORTATION INSECURITY
IN THE PAST 12 MONTHS, HAS LACK OF RELIABLE TRANSPORTATION KEPT YOU FROM MEDICAL APPOINTMENTS, MEETINGS, WORK OR FROM GETTING THINGS NEEDED FOR DAILY LIVING?: NO

## 2025-07-08 SDOH — ECONOMIC STABILITY: FOOD INSECURITY: WITHIN THE PAST 12 MONTHS, THE FOOD YOU BOUGHT JUST DIDN'T LAST AND YOU DIDN'T HAVE MONEY TO GET MORE.: NEVER TRUE

## 2025-07-08 SDOH — ECONOMIC STABILITY: FOOD INSECURITY: WITHIN THE PAST 12 MONTHS, YOU WORRIED THAT YOUR FOOD WOULD RUN OUT BEFORE YOU GOT MONEY TO BUY MORE.: PATIENT DECLINED

## 2025-07-08 SDOH — ECONOMIC STABILITY: INCOME INSECURITY: IN THE LAST 12 MONTHS, WAS THERE A TIME WHEN YOU WERE NOT ABLE TO PAY THE MORTGAGE OR RENT ON TIME?: NO

## 2025-07-08 SDOH — HEALTH STABILITY: PHYSICAL HEALTH: ON AVERAGE, HOW MANY MINUTES DO YOU ENGAGE IN EXERCISE AT THIS LEVEL?: 0 MIN

## 2025-07-08 SDOH — ECONOMIC STABILITY: INCOME INSECURITY: HOW HARD IS IT FOR YOU TO PAY FOR THE VERY BASICS LIKE FOOD, HOUSING, MEDICAL CARE, AND HEATING?: SOMEWHAT HARD

## 2025-07-08 SDOH — HEALTH STABILITY: PHYSICAL HEALTH: ON AVERAGE, HOW MANY DAYS PER WEEK DO YOU ENGAGE IN MODERATE TO STRENUOUS EXERCISE (LIKE A BRISK WALK)?: 1 DAY

## 2025-07-08 SDOH — ECONOMIC STABILITY: TRANSPORTATION INSECURITY
IN THE PAST 12 MONTHS, HAS LACK OF TRANSPORTATION KEPT YOU FROM MEETINGS, WORK, OR FROM GETTING THINGS NEEDED FOR DAILY LIVING?: NO

## 2025-07-08 SDOH — ECONOMIC STABILITY: TRANSPORTATION INSECURITY
IN THE PAST 12 MONTHS, HAS THE LACK OF TRANSPORTATION KEPT YOU FROM MEDICAL APPOINTMENTS OR FROM GETTING MEDICATIONS?: NO

## 2025-07-08 SDOH — ECONOMIC STABILITY: HOUSING INSECURITY
IN THE LAST 12 MONTHS, WAS THERE A TIME WHEN YOU DID NOT HAVE A STEADY PLACE TO SLEEP OR SLEPT IN A SHELTER (INCLUDING NOW)?: NO

## 2025-07-08 ASSESSMENT — LIFESTYLE VARIABLES
HOW OFTEN DO YOU HAVE A DRINK CONTAINING ALCOHOL: 2-4 TIMES A MONTH
HOW OFTEN DO YOU HAVE SIX OR MORE DRINKS ON ONE OCCASION: LESS THAN MONTHLY
SKIP TO QUESTIONS 9-10: 0
HOW MANY STANDARD DRINKS CONTAINING ALCOHOL DO YOU HAVE ON A TYPICAL DAY: 5 OR 6
AUDIT-C TOTAL SCORE: 5

## 2025-07-08 ASSESSMENT — SOCIAL DETERMINANTS OF HEALTH (SDOH)
HOW OFTEN DO YOU ATTENT MEETINGS OF THE CLUB OR ORGANIZATION YOU BELONG TO?: MORE THAN 4 TIMES PER YEAR
DO YOU BELONG TO ANY CLUBS OR ORGANIZATIONS SUCH AS CHURCH GROUPS UNIONS, FRATERNAL OR ATHLETIC GROUPS, OR SCHOOL GROUPS?: YES
HOW HARD IS IT FOR YOU TO PAY FOR THE VERY BASICS LIKE FOOD, HOUSING, MEDICAL CARE, AND HEATING?: SOMEWHAT HARD
IN THE PAST 12 MONTHS, HAS THE ELECTRIC, GAS, OIL, OR WATER COMPANY THREATENED TO SHUT OFF SERVICE IN YOUR HOME?: NO
HOW OFTEN DO YOU HAVE A DRINK CONTAINING ALCOHOL: 2-4 TIMES A MONTH
HOW OFTEN DO YOU ATTEND CHURCH OR RELIGIOUS SERVICES?: MORE THAN 4 TIMES PER YEAR
HOW OFTEN DO YOU GET TOGETHER WITH FRIENDS OR RELATIVES?: MORE THAN THREE TIMES A WEEK
WITHIN THE PAST 12 MONTHS, YOU WORRIED THAT YOUR FOOD WOULD RUN OUT BEFORE YOU GOT THE MONEY TO BUY MORE: PATIENT DECLINED
HOW OFTEN DO YOU ATTENT MEETINGS OF THE CLUB OR ORGANIZATION YOU BELONG TO?: MORE THAN 4 TIMES PER YEAR
IN A TYPICAL WEEK, HOW MANY TIMES DO YOU TALK ON THE PHONE WITH FAMILY, FRIENDS, OR NEIGHBORS?: MORE THAN THREE TIMES A WEEK
DO YOU BELONG TO ANY CLUBS OR ORGANIZATIONS SUCH AS CHURCH GROUPS UNIONS, FRATERNAL OR ATHLETIC GROUPS, OR SCHOOL GROUPS?: YES
HOW MANY DRINKS CONTAINING ALCOHOL DO YOU HAVE ON A TYPICAL DAY WHEN YOU ARE DRINKING: 5 OR 6
HOW OFTEN DO YOU GET TOGETHER WITH FRIENDS OR RELATIVES?: MORE THAN THREE TIMES A WEEK
HOW OFTEN DO YOU HAVE SIX OR MORE DRINKS ON ONE OCCASION: LESS THAN MONTHLY
HOW OFTEN DO YOU ATTEND CHURCH OR RELIGIOUS SERVICES?: MORE THAN 4 TIMES PER YEAR
IN A TYPICAL WEEK, HOW MANY TIMES DO YOU TALK ON THE PHONE WITH FAMILY, FRIENDS, OR NEIGHBORS?: MORE THAN THREE TIMES A WEEK

## 2025-07-11 ENCOUNTER — APPOINTMENT (OUTPATIENT)
Dept: MEDICAL GROUP | Facility: CLINIC | Age: 45
End: 2025-07-11
Payer: COMMERCIAL

## 2025-07-11 ENCOUNTER — HOSPITAL ENCOUNTER (OUTPATIENT)
Facility: MEDICAL CENTER | Age: 45
End: 2025-07-11
Payer: COMMERCIAL

## 2025-07-11 VITALS
OXYGEN SATURATION: 94 % | HEART RATE: 96 BPM | BODY MASS INDEX: 46.15 KG/M2 | DIASTOLIC BLOOD PRESSURE: 79 MMHG | TEMPERATURE: 96.8 F | SYSTOLIC BLOOD PRESSURE: 136 MMHG | HEIGHT: 65 IN | WEIGHT: 277 LBS

## 2025-07-11 DIAGNOSIS — E11.9 TYPE 2 DIABETES MELLITUS WITHOUT COMPLICATION, WITHOUT LONG-TERM CURRENT USE OF INSULIN (HCC): Chronic | ICD-10-CM

## 2025-07-11 DIAGNOSIS — Z12.4 PAP SMEAR FOR CERVICAL CANCER SCREENING: Primary | ICD-10-CM

## 2025-07-11 PROCEDURE — 99213 OFFICE O/P EST LOW 20 MIN: CPT | Mod: GE

## 2025-07-11 PROCEDURE — 87625 HPV TYPES 16 & 18 ONLY: CPT

## 2025-07-11 PROCEDURE — 87624 HPV HI-RISK TYP POOLED RSLT: CPT

## 2025-07-11 PROCEDURE — 3075F SYST BP GE 130 - 139MM HG: CPT

## 2025-07-11 PROCEDURE — 88142 CYTOPATH C/V THIN LAYER: CPT

## 2025-07-11 PROCEDURE — 3078F DIAST BP <80 MM HG: CPT

## 2025-07-11 ASSESSMENT — FIBROSIS 4 INDEX: FIB4 SCORE: 0.63

## 2025-07-11 NOTE — ASSESSMENT & PLAN NOTE
Chronic, uncontrolled. Previous A1C 11.2. Current regimen includes Metformin 1000 BID and Tirzepatide 7.5mg weekly. Patient tolerating well, no side effects. Increased Tirzepatide to 10mg today, due for next A1C 7/21/25. She has f/u appt with her PCP on 7/14.

## 2025-07-11 NOTE — PROGRESS NOTES
"CC:  Chief Complaint   Patient presents with    Gynecologic Exam       HISTORY OF PRESENT ILLNESS: Patient is a 45 y.o. female established patient.     Patient presents today for routine Pap smear.  Her previous Pap smear 4 years ago was HPV positive with ASCUS. States she was referred to OBGYN for a colposcopy, unsure of results.     Patient currently taking Tirzepatide 7.5 mg at previous appointment for uncontrolled diabetes, A1c of 11.2.  Tolerating medication well.  Has not lost any weight thus far.     Allergies:Patient has no known allergies.    Current Medications[1]    Social History[2]  Social History     Social History Narrative    Not on file       Family History   Problem Relation Age of Onset    Diabetes Mother     Diabetes Father         renal failure    Heart Disease Father         CHF    Cancer Father        Exam:    /79   Pulse 96   Temp 36 °C (96.8 °F)   Ht 1.651 m (5' 5\")   Wt (!) 126 kg (277 lb)   SpO2 94%   BMI 46.10 kg/m²  Body mass index is 46.1 kg/m².    Gen: Alert and oriented, No apparent distress. Obese  Lungs: Normal effort, CTA bilaterally, no wheezes, rhonchi, or rales  CV: Regular rate and rhythm. No murmurs, rubs, or gallops.  Abd: Soft, non-tender, non-distended  :     Perineum and external genitalia normal without rash. Vagina with normal  and physiologic discharge. Cervix without visible lesions or discharge.   Neuro: A/O x 4, Motor/sensory grossly intact  Psych: Normal behavior, normal affect      LABS: Results reviewed and discussed with the patient, questions answered.    Assessment/Plan:  Problem List Items Addressed This Visit       Type 2 diabetes mellitus without complication, without long-term current use of insulin (HCC) (Chronic)    Chronic, uncontrolled. Previous A1C 11.2. Current regimen includes Metformin 1000 BID and Tirzepatide 7.5mg weekly. Patient tolerating well, no side effects. Increased Tirzepatide to 10mg today, due for next A1C 7/21/25. She has " f/u appt with her PCP on 7/14.          Pap smear for cervical cancer screening - Primary    Previous pap smear 4 years ago HPV positive with ASCUS, colpo performed (unsure of results).    - Pap smear performed today, [facility]          Relevant Orders    THINPREP PAP WITH HPV      Orders Placed This Encounter    THINPREP PAP WITH HPV       Future Appointments   Date Time Provider Department Center   7/11/2025  8:30 AM JES [facility]   7/14/2025  8:30 AM César Arevalo M.D. [facility]       Patient Active Problem List    Diagnosis Date Noted    Pap smear for cervical cancer screening 07/11/2025    Primary hypertension 12/31/2021    Abnormal Papanicolaou smear of cervix with positive human papilloma virus (HPV) test 12/31/2021    Vitamin D deficiency 12/31/2021    Vaginal itching 02/04/2021    Elevated cholesterol/high density lipoprotein ratio 09/15/2020    Closed fracture of left wrist 09/01/2020    Type 2 diabetes mellitus without complication, without long-term current use of insulin (HCC) 09/01/2020    Mild intermittent asthma without complication 09/01/2020    Obesity with body mass index 30 or greater 03/21/2012        Raj Barrios   [facility] Resident           [1]   Current Outpatient Medications   Medication Sig Dispense Refill    Tirzepatide (MOUNJARO) 7.5 MG/0.5ML Solution Auto-injector Inject 7.5 mg under the skin every 7 days. 2 mL 1    albuterol 108 (90 Base) MCG/ACT Aero Soln inhalation aerosol Inhale 2 Puffs every four hours as needed for Shortness of Breath. 18 g 2    budesonide-formoterol (SYMBICORT) 80-4.5 MCG/ACT Aerosol Inhale 1 Puff 2 times a day. May also inhale 1 Puff every four hours as needed (cough, wheezing). 10.2 g 3    metformin (GLUCOPHAGE) 1000 MG tablet Take 1 Tablet by mouth 2 times a day with meals. 180 Tablet 3    valsartan (DIOVAN) 160 MG Tab Take 1 Tablet by mouth every day. 100 Tablet 3    Blood  Glucose Monitoring Suppl (BLOOD GLUCOSE MONITOR SYSTEM) w/Device Kit Test blood sugar as recommended by provider. Blood glucose monitoring kit. 1 Kit 0    glucose blood strip Use one test strip to test blood sugar once daily early morning before first meal. 100 Strip 3    Lancets Use one lancet to test blood sugar once daily early morning before first meal. 100 Each 0    Alcohol Swabs Wipe site with prep pad prior to injection. 100 Each 0    naltrexone (DEPADE) 50 MG Tab Take 1 Tablet by mouth every day. 30 Tablet 11    atorvastatin (LIPITOR) 20 MG Tab Take 1 Tablet by mouth every day. 90 Tablet 3    hydroCHLOROthiazide (HYDRODIURIL) 12.5 MG tablet Take 1 Tablet by mouth every day. 90 Tablet 3    Multiple Vitamin (MULTIVITAMIN ADULT PO) Take  by mouth.      Lancets Lancets order: Lancets for f Insurance Preference. meter. Sig: use BID and prn ssx high or low sugar. #100 RF x 3 200 Each 0    Blood Glucose Monitoring Suppl Device Meter: Dispense Device of Insurance Preference. Sig. Use as directed for blood sugar monitoring. #1. NR. 1 Each 0    VITAMIN D-VITAMIN K PO Take 5,000 Units by mouth.      Probiotic Product (PROBIOTIC DAILY PO) Take  by mouth.       No current facility-administered medications for this visit.   [2]   Social History  Tobacco Use    Smoking status: Never    Smokeless tobacco: Never   Vaping Use    Vaping status: Never Used   Substance Use Topics    Alcohol use: Yes     Alcohol/week: 2.4 - 3.0 oz     Types: 4 - 5 Shots of liquor per week     Comment: occ    Drug use: Yes     Frequency: 1.0 times per week     Types: Marijuana, Oral     Comment: edibles peter

## 2025-07-11 NOTE — ASSESSMENT & PLAN NOTE
Previous pap smear 4 years ago HPV positive with ASCUS, colpo performed (unsure of results).    - Pap smear performed today, [facility]

## 2025-07-12 DIAGNOSIS — Z12.4 PAP SMEAR FOR CERVICAL CANCER SCREENING: ICD-10-CM

## 2025-07-14 ENCOUNTER — APPOINTMENT (OUTPATIENT)
Dept: MEDICAL GROUP | Facility: CLINIC | Age: 45
End: 2025-07-14
Payer: COMMERCIAL

## 2025-07-14 ENCOUNTER — PHARMACY VISIT (OUTPATIENT)
Dept: PHARMACY | Facility: MEDICAL CENTER | Age: 45
End: 2025-07-14
Payer: COMMERCIAL

## 2025-07-14 VITALS
DIASTOLIC BLOOD PRESSURE: 79 MMHG | HEART RATE: 87 BPM | TEMPERATURE: 97.8 F | OXYGEN SATURATION: 93 % | WEIGHT: 278.7 LBS | RESPIRATION RATE: 12 BRPM | BODY MASS INDEX: 46.44 KG/M2 | HEIGHT: 65 IN | SYSTOLIC BLOOD PRESSURE: 133 MMHG

## 2025-07-14 DIAGNOSIS — E11.65 TYPE 2 DIABETES MELLITUS WITH HYPERGLYCEMIA, WITHOUT LONG-TERM CURRENT USE OF INSULIN (HCC): Primary | ICD-10-CM

## 2025-07-14 DIAGNOSIS — E66.813 CLASS 3 SEVERE OBESITY WITH SERIOUS COMORBIDITY AND BODY MASS INDEX (BMI) OF 45.0 TO 49.9 IN ADULT, UNSPECIFIED OBESITY TYPE: ICD-10-CM

## 2025-07-14 DIAGNOSIS — I10 PRIMARY HYPERTENSION: ICD-10-CM

## 2025-07-14 LAB
HBA1C MFR BLD: 9.5 % (ref ?–5.8)
POCT INT CON NEG: NEGATIVE
POCT INT CON POS: POSITIVE

## 2025-07-14 PROCEDURE — 3075F SYST BP GE 130 - 139MM HG: CPT | Performed by: STUDENT IN AN ORGANIZED HEALTH CARE EDUCATION/TRAINING PROGRAM

## 2025-07-14 PROCEDURE — 3078F DIAST BP <80 MM HG: CPT | Performed by: STUDENT IN AN ORGANIZED HEALTH CARE EDUCATION/TRAINING PROGRAM

## 2025-07-14 PROCEDURE — 99214 OFFICE O/P EST MOD 30 MIN: CPT | Performed by: STUDENT IN AN ORGANIZED HEALTH CARE EDUCATION/TRAINING PROGRAM

## 2025-07-14 PROCEDURE — 83036 HEMOGLOBIN GLYCOSYLATED A1C: CPT | Performed by: STUDENT IN AN ORGANIZED HEALTH CARE EDUCATION/TRAINING PROGRAM

## 2025-07-14 PROCEDURE — RXMED WILLOW AMBULATORY MEDICATION CHARGE: Performed by: STUDENT IN AN ORGANIZED HEALTH CARE EDUCATION/TRAINING PROGRAM

## 2025-07-14 RX ORDER — TIRZEPATIDE 10 MG/.5ML
10 INJECTION, SOLUTION SUBCUTANEOUS
Qty: 2 ML | Refills: 1 | Status: SHIPPED | OUTPATIENT
Start: 2025-07-14 | End: 2025-08-13

## 2025-07-14 RX ORDER — ATORVASTATIN CALCIUM 20 MG/1
20 TABLET, FILM COATED ORAL DAILY
Qty: 90 TABLET | Refills: 3 | Status: SHIPPED | OUTPATIENT
Start: 2025-07-14

## 2025-07-14 ASSESSMENT — FIBROSIS 4 INDEX: FIB4 SCORE: 0.63

## 2025-07-14 NOTE — PROGRESS NOTES
"Family Medicine Clinic Note    Date: 7/14/2025    PPE used by provider during encounter: surgical mask      ASSESSMENT & PLAN    1. Type 2 diabetes mellitus with hyperglycemia, without long-term current use of insulin ([facility])    A1c improved from 11-9 . tolerating Mounjaro, plan to increase to 10 mg weekly.  Discussed lifestyle modification patient plans to cut out [employer] muffins in the next 4 weeks by the time we see her and substituted with keto bread that she bought at [employer] as well. Has follow up scheduled with Dr. Veloz in 4 weeks for medication titration.  - POCT  A1C  - Tirzepatide (MOUNJARO) 10 MG/0.5ML Solution Auto-injector; Inject 10 mg under the skin every 7 days for 30 days.  Dispense: 2 mL; Refill: 1  - atorvastatin (LIPITOR) 20 MG Tab; Take 1 Tablet by mouth every day.  Dispense: 90 Tablet; Refill: 3    2. Primary hypertension  Reasonably well-controlled plan to continue valsartan    3. Class 3 severe obesity with serious comorbidity and body mass index (BMI) of 45.0 to 49.9 in adult, unspecified obesity type  Mounjaro as above               SUBJECTIVE    Chief Complaint   Patient presents with    Follow-Up     Tirzepatid and other medication        Marisabel is a 45 y.o. person presenting today with the following concerns:    Tolerating mounjaro well  Occasionally getting funky buprps    Reduction in amount        OBJECTIVE  /79 (BP Location: Right arm, Patient Position: Sitting, BP Cuff Size: Large adult)   Pulse 87   Temp 36.6 °C (97.8 °F) (Temporal)   Resp 12   Ht 1.638 m (5' 4.5\")   Wt (!) 126 kg (278 lb 11.2 oz)   SpO2 93%   BMI 47.10 kg/m²      General: Well appearing, NAD  Lungs: breathing comfortably  Neuro: 2-12 grossly intact        César Arevalo MD   [facility]  [facility]    "

## 2025-07-16 LAB
HPV I/H RISK 1 DNA SPEC QL NAA+PROBE: DETECTED
HPV16 DNA CVX QL PROBE+SIG AMP: NOT DETECTED
HPV18 DNA CVX QL PROBE+SIG AMP: NOT DETECTED
SPECIMEN SOURCE: ABNORMAL
SPECIMEN SOURCE: NORMAL
THINPREP PAP, CYTOLOGY NL11781: NORMAL

## 2025-08-04 ENCOUNTER — APPOINTMENT (OUTPATIENT)
Dept: MEDICAL GROUP | Facility: CLINIC | Age: 45
End: 2025-08-04
Payer: COMMERCIAL

## 2025-08-04 PROCEDURE — RXMED WILLOW AMBULATORY MEDICATION CHARGE

## 2025-08-04 ASSESSMENT — FIBROSIS 4 INDEX: FIB4 SCORE: 0.63

## 2025-08-15 ENCOUNTER — PHARMACY VISIT (OUTPATIENT)
Dept: PHARMACY | Facility: MEDICAL CENTER | Age: 45
End: 2025-08-15
Payer: COMMERCIAL

## 2025-08-18 ENCOUNTER — APPOINTMENT (OUTPATIENT)
Dept: MEDICAL GROUP | Facility: CLINIC | Age: 45
End: 2025-08-18
Payer: COMMERCIAL

## 2025-09-15 ENCOUNTER — APPOINTMENT (OUTPATIENT)
Dept: MEDICAL GROUP | Facility: CLINIC | Age: 45
End: 2025-09-15
Payer: COMMERCIAL

## (undated) DEVICE — GLOVE BIOGEL SZ 8 SURGICAL PF LTX - (50PR/BX 4BX/CA)

## (undated) DEVICE — SPLINT PLASTER 4 IN  X 15 IN - (50/BX 12BX/CA)

## (undated) DEVICE — GLOVE SZ 6.5 BIOGEL PI MICRO - PF LF (50PR/BX)

## (undated) DEVICE — PAD LAP STERILE 18 X 18 - (5/PK 40PK/CA)

## (undated) DEVICE — CANISTER SUCTION 3000ML MECHANICAL FILTER AUTO SHUTOFF MEDI-VAC NONSTERILE LF DISP  (40EA/CA)

## (undated) DEVICE — SET EXTENSION WITH 2 PORTS (48EA/CA) ***PART #2C8610 IS A SUBSTITUTE*****

## (undated) DEVICE — CHLORAPREP 26 ML APPLICATOR - ORANGE TINT(25/CA)

## (undated) DEVICE — KIT ANESTHESIA W/CIRCUIT & 3/LT BAG W/FILTER (20EA/CA)

## (undated) DEVICE — GLOVE BIOGEL INDICATOR SZ 8 SURGICAL PF LTX - (50/BX 4BX/CA)

## (undated) DEVICE — PAD PREP 24 X 48 CUFFED - (100/CA)

## (undated) DEVICE — PADDING CAST 4 IN STERILE - 4 X 4 YDS (24/CA)

## (undated) DEVICE — STOCKINET BIAS 4 IN STERILE - (20/CA)

## (undated) DEVICE — LACTATED RINGERS INJ 1000 ML - (14EA/CA 60CA/PF)

## (undated) DEVICE — GLOVE BIOGEL INDICATOR SZ 8.5 SURGICAL PF LTX - (50/BX 4BX/CA)

## (undated) DEVICE — SUTURE 2-0 VICRYL PLUS CT-2 - 27 INCH (36/BX)

## (undated) DEVICE — HEAD HOLDER JUNIOR/ADULT

## (undated) DEVICE — GLOVE BIOGEL PI ORTHO SZ 8.5 PF LF (40/BX)

## (undated) DEVICE — NEPTUNE 4 PORT MANIFOLD - (20/PK)

## (undated) DEVICE — SENSOR SPO2 NEO LNCS ADHESIVE (20/BX) SEE USER NOTES

## (undated) DEVICE — SODIUM CHL IRRIGATION 0.9% 1000ML (12EA/CA)

## (undated) DEVICE — GOWN WARMING STANDARD FLEX - (30/CA)

## (undated) DEVICE — SUTURE 3-0 ETHILON PS-1 (36PK/BX)

## (undated) DEVICE — GLOVE BIOGEL SZ 7.5 SURGICAL PF LTX - (50PR/BX 4BX/CA)

## (undated) DEVICE — PROTECTOR ULNA NERVE - (36PR/CA)

## (undated) DEVICE — SUTURE GENERAL

## (undated) DEVICE — DRAPE STRLE REG TOWEL 18X24 - (10/BX 4BX/CA)"

## (undated) DEVICE — SUCTION INSTRUMENT YANKAUER BULBOUS TIP W/O VENT (50EA/CA)

## (undated) DEVICE — TOURNIQUET CUFF 18 X 3 ONE PORT DISP - STERILE (10/BX)

## (undated) DEVICE — ELECTRODE 850 FOAM ADHESIVE - HYDROGEL RADIOTRNSPRNT (50/PK)

## (undated) DEVICE — DRILL SHORT W/AO QC 2.0MM (2TX2=4)

## (undated) DEVICE — SUTURE 4-0 ETHILON FS-1 18 (36PK/BX)"

## (undated) DEVICE — CORDS BIPOLAR COAGULATION - 12FT STERILE DISP. (10EA/BX)

## (undated) DEVICE — MASK ANESTHESIA ADULT  - (100/CA)

## (undated) DEVICE — DRAPE C-ARM LARGE 41IN X 74 IN - (10/BX 2BX/CA)

## (undated) DEVICE — SET LEADWIRE 5 LEAD BEDSIDE DISPOSABLE ECG (1SET OF 5/EA)

## (undated) DEVICE — TUBING CLEARLINK DUO-VENT - C-FLO (48EA/CA)

## (undated) DEVICE — GLOVE BIOGEL SZ 6.5 SURGICAL PF LTX (50PR/BX 4BX/CA)

## (undated) DEVICE — KIT ROOM DECONTAMINATION

## (undated) DEVICE — GLOVE BIOGEL PI INDICATOR SZ 7.0 SURGICAL PF LF - (50/BX 4BX/CA)

## (undated) DEVICE — DRILL TWIST 2.30MM (1TCONX2=2)

## (undated) DEVICE — SLEEVE, VASO, THIGH, MED